# Patient Record
Sex: FEMALE | Race: WHITE | NOT HISPANIC OR LATINO | Employment: OTHER | URBAN - METROPOLITAN AREA
[De-identification: names, ages, dates, MRNs, and addresses within clinical notes are randomized per-mention and may not be internally consistent; named-entity substitution may affect disease eponyms.]

---

## 2017-02-13 ENCOUNTER — HOSPITAL ENCOUNTER (INPATIENT)
Facility: HOSPITAL | Age: 82
LOS: 4 days | Discharge: NURSING, REHAB OR LONG-TERM CARE | End: 2017-02-17
Attending: EMERGENCY MEDICINE | Admitting: INTERNAL MEDICINE

## 2017-02-13 ENCOUNTER — APPOINTMENT (OUTPATIENT)
Dept: GENERAL RADIOLOGY | Facility: HOSPITAL | Age: 82
End: 2017-02-13

## 2017-02-13 DIAGNOSIS — I21.4 NSTEMI (NON-ST ELEVATED MYOCARDIAL INFARCTION) (HCC): Primary | ICD-10-CM

## 2017-02-13 PROBLEM — E78.5 HYPERLIPIDEMIA: Status: ACTIVE | Noted: 2017-02-13

## 2017-02-13 PROBLEM — F25.9 SCHIZOAFFECTIVE DISORDER (HCC): Status: ACTIVE | Noted: 2017-02-13

## 2017-02-13 PROBLEM — I10 HTN (HYPERTENSION): Status: ACTIVE | Noted: 2017-02-13

## 2017-02-13 PROBLEM — I50.9 CHF (CONGESTIVE HEART FAILURE) (HCC): Status: ACTIVE | Noted: 2017-02-13

## 2017-02-13 PROBLEM — IMO0002 TYPE II DIABETES MELLITUS, UNCONTROLLED: Status: ACTIVE | Noted: 2017-02-13

## 2017-02-13 PROBLEM — F03.90 DEMENTIA (HCC): Status: ACTIVE | Noted: 2017-02-13

## 2017-02-13 PROBLEM — I25.10 CAD (CORONARY ARTERY DISEASE): Status: ACTIVE | Noted: 2017-02-13

## 2017-02-13 LAB
ALBUMIN SERPL-MCNC: 4 G/DL (ref 3.5–5.2)
ALBUMIN/GLOB SERPL: 1.3 G/DL
ALP SERPL-CCNC: 118 U/L (ref 39–117)
ALT SERPL W P-5'-P-CCNC: 21 U/L (ref 1–33)
ANION GAP SERPL CALCULATED.3IONS-SCNC: 15.7 MMOL/L
AST SERPL-CCNC: 19 U/L (ref 1–32)
BASOPHILS # BLD AUTO: 0.01 10*3/MM3 (ref 0–0.2)
BASOPHILS NFR BLD AUTO: 0.1 % (ref 0–1.5)
BILIRUB SERPL-MCNC: <0.2 MG/DL (ref 0.1–1.2)
BUN BLD-MCNC: 45 MG/DL (ref 8–23)
BUN/CREAT SERPL: 32.4 (ref 7–25)
CALCIUM SPEC-SCNC: 9.7 MG/DL (ref 8.6–10.5)
CHLORIDE SERPL-SCNC: 99 MMOL/L (ref 98–107)
CO2 SERPL-SCNC: 26.3 MMOL/L (ref 22–29)
CREAT BLD-MCNC: 1.39 MG/DL (ref 0.57–1)
DEPRECATED RDW RBC AUTO: 42.5 FL (ref 37–54)
EOSINOPHIL # BLD AUTO: 0.08 10*3/MM3 (ref 0–0.7)
EOSINOPHIL NFR BLD AUTO: 1.1 % (ref 0.3–6.2)
ERYTHROCYTE [DISTWIDTH] IN BLOOD BY AUTOMATED COUNT: 14.8 % (ref 11.7–13)
GFR SERPL CREATININE-BSD FRML MDRD: 36 ML/MIN/1.73
GLOBULIN UR ELPH-MCNC: 3 GM/DL
GLUCOSE BLD-MCNC: 301 MG/DL (ref 65–99)
GLUCOSE BLDC GLUCOMTR-MCNC: 159 MG/DL (ref 70–130)
GLUCOSE BLDC GLUCOMTR-MCNC: 166 MG/DL (ref 70–130)
HCT VFR BLD AUTO: 36 % (ref 35.6–45.5)
HGB BLD-MCNC: 11.3 G/DL (ref 11.9–15.5)
HOLD SPECIMEN: NORMAL
IMM GRANULOCYTES # BLD: 0 10*3/MM3 (ref 0–0.03)
IMM GRANULOCYTES NFR BLD: 0 % (ref 0–0.5)
LYMPHOCYTES # BLD AUTO: 1.46 10*3/MM3 (ref 0.9–4.8)
LYMPHOCYTES NFR BLD AUTO: 19.6 % (ref 19.6–45.3)
MCH RBC QN AUTO: 24.9 PG (ref 26.9–32)
MCHC RBC AUTO-ENTMCNC: 31.4 G/DL (ref 32.4–36.3)
MCV RBC AUTO: 79.5 FL (ref 80.5–98.2)
MONOCYTES # BLD AUTO: 0.35 10*3/MM3 (ref 0.2–1.2)
MONOCYTES NFR BLD AUTO: 4.7 % (ref 5–12)
NEUTROPHILS # BLD AUTO: 5.56 10*3/MM3 (ref 1.9–8.1)
NEUTROPHILS NFR BLD AUTO: 74.5 % (ref 42.7–76)
NT-PROBNP SERPL-MCNC: 477.3 PG/ML (ref 0–1800)
PLATELET # BLD AUTO: 231 10*3/MM3 (ref 140–500)
PMV BLD AUTO: 11.7 FL (ref 6–12)
POTASSIUM BLD-SCNC: 4.3 MMOL/L (ref 3.5–5.2)
PROT SERPL-MCNC: 7 G/DL (ref 6–8.5)
RBC # BLD AUTO: 4.53 10*6/MM3 (ref 3.9–5.2)
SODIUM BLD-SCNC: 141 MMOL/L (ref 136–145)
TROPONIN T SERPL-MCNC: 0.26 NG/ML (ref 0–0.03)
WBC NRBC COR # BLD: 7.46 10*3/MM3 (ref 4.5–10.7)
WHOLE BLOOD HOLD SPECIMEN: NORMAL

## 2017-02-13 PROCEDURE — 93005 ELECTROCARDIOGRAM TRACING: CPT | Performed by: EMERGENCY MEDICINE

## 2017-02-13 PROCEDURE — 83880 ASSAY OF NATRIURETIC PEPTIDE: CPT | Performed by: EMERGENCY MEDICINE

## 2017-02-13 PROCEDURE — 82962 GLUCOSE BLOOD TEST: CPT

## 2017-02-13 PROCEDURE — 99285 EMERGENCY DEPT VISIT HI MDM: CPT

## 2017-02-13 PROCEDURE — 63710000001 INSULIN ASPART PER 5 UNITS: Performed by: INTERNAL MEDICINE

## 2017-02-13 PROCEDURE — 85025 COMPLETE CBC W/AUTO DIFF WBC: CPT | Performed by: EMERGENCY MEDICINE

## 2017-02-13 PROCEDURE — 80053 COMPREHEN METABOLIC PANEL: CPT | Performed by: EMERGENCY MEDICINE

## 2017-02-13 PROCEDURE — 25010000002 ENOXAPARIN PER 10 MG: Performed by: INTERNAL MEDICINE

## 2017-02-13 PROCEDURE — 84484 ASSAY OF TROPONIN QUANT: CPT | Performed by: EMERGENCY MEDICINE

## 2017-02-13 PROCEDURE — 99222 1ST HOSP IP/OBS MODERATE 55: CPT | Performed by: INTERNAL MEDICINE

## 2017-02-13 PROCEDURE — 71020 HC CHEST PA AND LATERAL: CPT

## 2017-02-13 PROCEDURE — 93010 ELECTROCARDIOGRAM REPORT: CPT | Performed by: INTERNAL MEDICINE

## 2017-02-13 RX ORDER — METOLAZONE 5 MG/1
5 TABLET ORAL DAILY
COMMUNITY
End: 2017-02-17 | Stop reason: HOSPADM

## 2017-02-13 RX ORDER — HYDRALAZINE HYDROCHLORIDE 50 MG/1
50 TABLET, FILM COATED ORAL 4 TIMES DAILY
Status: DISCONTINUED | OUTPATIENT
Start: 2017-02-14 | End: 2017-02-16

## 2017-02-13 RX ORDER — IPRATROPIUM BROMIDE AND ALBUTEROL SULFATE 2.5; .5 MG/3ML; MG/3ML
3 SOLUTION RESPIRATORY (INHALATION)
COMMUNITY

## 2017-02-13 RX ORDER — RISPERIDONE 0.5 MG/1
0.5 TABLET, ORALLY DISINTEGRATING ORAL NIGHTLY
Status: DISCONTINUED | OUTPATIENT
Start: 2017-02-13 | End: 2017-02-14

## 2017-02-13 RX ORDER — POTASSIUM CHLORIDE 20 MEQ/1
40 TABLET, EXTENDED RELEASE ORAL DAILY
Status: ON HOLD | COMMUNITY
End: 2020-01-01 | Stop reason: SDUPTHER

## 2017-02-13 RX ORDER — HYDRALAZINE HYDROCHLORIDE 25 MG/1
25 TABLET, FILM COATED ORAL 4 TIMES DAILY
COMMUNITY
End: 2017-02-17 | Stop reason: HOSPADM

## 2017-02-13 RX ORDER — ERGOCALCIFEROL 1.25 MG/1
50000 CAPSULE ORAL 3 TIMES WEEKLY
COMMUNITY
End: 2018-01-09 | Stop reason: DRUGHIGH

## 2017-02-13 RX ORDER — FUROSEMIDE 40 MG/1
40 TABLET ORAL 2 TIMES DAILY
Status: DISCONTINUED | OUTPATIENT
Start: 2017-02-13 | End: 2017-02-17 | Stop reason: HOSPADM

## 2017-02-13 RX ORDER — ASPIRIN 325 MG
325 TABLET ORAL DAILY
Status: DISCONTINUED | OUTPATIENT
Start: 2017-02-13 | End: 2017-02-13

## 2017-02-13 RX ORDER — NITROGLYCERIN 0.4 MG/1
0.4 TABLET SUBLINGUAL
COMMUNITY

## 2017-02-13 RX ORDER — HYDRALAZINE HYDROCHLORIDE 25 MG/1
25 TABLET, FILM COATED ORAL 4 TIMES DAILY
Status: DISCONTINUED | OUTPATIENT
Start: 2017-02-13 | End: 2017-02-13

## 2017-02-13 RX ORDER — IPRATROPIUM BROMIDE AND ALBUTEROL SULFATE 2.5; .5 MG/3ML; MG/3ML
3 SOLUTION RESPIRATORY (INHALATION)
Status: DISCONTINUED | OUTPATIENT
Start: 2017-02-13 | End: 2017-02-17 | Stop reason: HOSPADM

## 2017-02-13 RX ORDER — FUROSEMIDE 40 MG/1
40 TABLET ORAL 3 TIMES DAILY
COMMUNITY
End: 2018-12-05 | Stop reason: HOSPADM

## 2017-02-13 RX ORDER — ACETAMINOPHEN 325 MG/1
650 TABLET ORAL EVERY 4 HOURS PRN
Status: DISCONTINUED | OUTPATIENT
Start: 2017-02-13 | End: 2017-02-17 | Stop reason: HOSPADM

## 2017-02-13 RX ORDER — LISINOPRIL 40 MG/1
40 TABLET ORAL DAILY
Status: ON HOLD | COMMUNITY
End: 2020-01-01 | Stop reason: SDUPTHER

## 2017-02-13 RX ORDER — ASPIRIN 325 MG
325 TABLET ORAL DAILY
Status: DISCONTINUED | OUTPATIENT
Start: 2017-02-14 | End: 2017-02-14

## 2017-02-13 RX ORDER — LISINOPRIL 40 MG/1
40 TABLET ORAL DAILY
Status: DISCONTINUED | OUTPATIENT
Start: 2017-02-13 | End: 2017-02-17 | Stop reason: HOSPADM

## 2017-02-13 RX ORDER — ASPIRIN 325 MG
325 TABLET ORAL DAILY
COMMUNITY
End: 2018-01-13 | Stop reason: HOSPADM

## 2017-02-13 RX ORDER — ACETAMINOPHEN 325 MG/1
650 TABLET ORAL EVERY 4 HOURS PRN
COMMUNITY

## 2017-02-13 RX ORDER — NICOTINE POLACRILEX 4 MG
15 LOZENGE BUCCAL
Status: DISCONTINUED | OUTPATIENT
Start: 2017-02-13 | End: 2017-02-17 | Stop reason: HOSPADM

## 2017-02-13 RX ORDER — TRAMADOL HYDROCHLORIDE 50 MG/1
50 TABLET ORAL EVERY 6 HOURS PRN
Status: DISCONTINUED | OUTPATIENT
Start: 2017-02-13 | End: 2017-02-17

## 2017-02-13 RX ORDER — POTASSIUM CHLORIDE 750 MG/1
20 CAPSULE, EXTENDED RELEASE ORAL DAILY
Status: DISCONTINUED | OUTPATIENT
Start: 2017-02-13 | End: 2017-02-17 | Stop reason: HOSPADM

## 2017-02-13 RX ORDER — ASPIRIN 325 MG
325 TABLET ORAL ONCE
Status: COMPLETED | OUTPATIENT
Start: 2017-02-13 | End: 2017-02-13

## 2017-02-13 RX ORDER — NITROGLYCERIN 0.4 MG/1
0.4 TABLET SUBLINGUAL
Status: DISCONTINUED | OUTPATIENT
Start: 2017-02-13 | End: 2017-02-17 | Stop reason: HOSPADM

## 2017-02-13 RX ORDER — SODIUM CHLORIDE 0.9 % (FLUSH) 0.9 %
10 SYRINGE (ML) INJECTION AS NEEDED
Status: DISCONTINUED | OUTPATIENT
Start: 2017-02-13 | End: 2017-02-17 | Stop reason: HOSPADM

## 2017-02-13 RX ORDER — RISPERIDONE 0.5 MG/1
0.5 TABLET, ORALLY DISINTEGRATING ORAL NIGHTLY
COMMUNITY
End: 2017-02-17 | Stop reason: HOSPADM

## 2017-02-13 RX ORDER — DEXTROSE MONOHYDRATE 25 G/50ML
25 INJECTION, SOLUTION INTRAVENOUS
Status: DISCONTINUED | OUTPATIENT
Start: 2017-02-13 | End: 2017-02-17 | Stop reason: HOSPADM

## 2017-02-13 RX ORDER — TRAMADOL HYDROCHLORIDE 50 MG/1
50 TABLET ORAL EVERY 6 HOURS PRN
Status: ON HOLD | COMMUNITY
End: 2017-02-17

## 2017-02-13 RX ORDER — ISOSORBIDE MONONITRATE 60 MG/1
60 TABLET, EXTENDED RELEASE ORAL DAILY
COMMUNITY
End: 2017-02-17 | Stop reason: HOSPADM

## 2017-02-13 RX ADMIN — FUROSEMIDE 40 MG: 40 TABLET ORAL at 18:23

## 2017-02-13 RX ADMIN — NITROGLYCERIN 1 INCH: 20 OINTMENT TOPICAL at 23:36

## 2017-02-13 RX ADMIN — CLONIDINE 1 PATCH: 0.2 PATCH, EXTENDED RELEASE TRANSDERMAL at 18:23

## 2017-02-13 RX ADMIN — RISPERIDONE 0.5 MG: 0.5 TABLET, ORALLY DISINTEGRATING ORAL at 21:40

## 2017-02-13 RX ADMIN — HYDRALAZINE HYDROCHLORIDE 25 MG: 25 TABLET ORAL at 18:23

## 2017-02-13 RX ADMIN — POTASSIUM CHLORIDE 20 MEQ: 750 CAPSULE, EXTENDED RELEASE ORAL at 17:16

## 2017-02-13 RX ADMIN — LISINOPRIL 40 MG: 40 TABLET ORAL at 18:23

## 2017-02-13 RX ADMIN — NITROGLYCERIN 1 INCH: 20 OINTMENT TOPICAL at 18:22

## 2017-02-13 RX ADMIN — HYDRALAZINE HYDROCHLORIDE 25 MG: 25 TABLET ORAL at 21:40

## 2017-02-13 RX ADMIN — INSULIN ASPART 2 UNITS: 100 INJECTION, SOLUTION INTRAVENOUS; SUBCUTANEOUS at 21:40

## 2017-02-13 RX ADMIN — TRAMADOL HYDROCHLORIDE 50 MG: 50 TABLET, COATED ORAL at 19:32

## 2017-02-13 RX ADMIN — NITROGLYCERIN 1 INCH: 20 OINTMENT TOPICAL at 14:20

## 2017-02-13 RX ADMIN — ASPIRIN 325 MG: 325 TABLET ORAL at 14:19

## 2017-02-13 RX ADMIN — ENOXAPARIN SODIUM 110 MG: 120 INJECTION SUBCUTANEOUS at 18:22

## 2017-02-13 RX ADMIN — INSULIN ASPART 10 UNITS: 100 INJECTION, SOLUTION INTRAVENOUS; SUBCUTANEOUS at 17:17

## 2017-02-13 NOTE — PLAN OF CARE
Problem: Patient Care Overview (Adult)  Goal: Plan of Care Review  Outcome: Ongoing (interventions implemented as appropriate)    02/13/17 1839   Coping/Psychosocial Response Interventions   Plan Of Care Reviewed With patient   Patient Care Overview   Progress no change   Outcome Evaluation   Outcome Summary/Follow up Plan Pt a new admission, no falls, VSS, no more c/o chest pain, pt resting comfortably          Problem: Fall Risk (Adult)  Goal: Absence of Falls  Outcome: Ongoing (interventions implemented as appropriate)    02/13/17 1839   Fall Risk (Adult)   Absence of Falls achieves outcome         Problem: Diabetes, Type 2 (Adult)  Goal: Signs and Symptoms of Listed Potential Problems Will be Absent or Manageable (Diabetes, Type 2)  Outcome: Ongoing (interventions implemented as appropriate)    02/13/17 1839   Diabetes, Type 2   Problems Assessed (Type 2 Diabetes) all   Problems Present (Type 2 Diabetes) impaired glycemic control

## 2017-02-13 NOTE — ED PROVIDER NOTES
EMERGENCY DEPARTMENT ENCOUNTER    CHIEF COMPLAINT  Chief Complaint: Chest Pain  History given by: Patient; Nursing home  History limited by: Dementia  Room Number: 09/09  PMD: Gustavo Mcgrath MD      HPI:  Pt is a 81 y.o. female who presents complaining of chest pain that occurs when she gets upset. Pt states that she is also having dyspnea. Pt has a hx of dementia/schizoaffective d/o and is a poor historian - she tends to ramble and has to be redirected.    Duration:  Unable to assess  Onset: gradual  Timing: intermittent  Location: central chest  Radiation: none  Quality: unable to assess  Intensity/Severity: unable to assess  Progression: improved  Associated Symptoms: dyspnea  Aggravating Factors: getting upset  Alleviating Factors: none  Previous Episodes: similar episodes when she gets upset  Treatment before arrival: none    PAST MEDICAL HISTORY  Active Ambulatory Problems     Diagnosis Date Noted   • No Active Ambulatory Problems     Resolved Ambulatory Problems     Diagnosis Date Noted   • No Resolved Ambulatory Problems     No Additional Past Medical History       PAST SURGICAL HISTORY  No past surgical history on file.    FAMILY HISTORY  No family history on file.    SOCIAL HISTORY  Social History     Social History   • Marital status: Single     Spouse name: N/A   • Number of children: N/A   • Years of education: N/A     Occupational History   • Not on file.     Social History Main Topics   • Smoking status: Not on file   • Smokeless tobacco: Not on file   • Alcohol use Not on file   • Drug use: Not on file   • Sexual activity: Not on file     Other Topics Concern   • Not on file     Social History Narrative       ALLERGIES  Review of patient's allergies indicates not on file.    REVIEW OF SYSTEMS  Review of Systems   Unable to perform ROS: Dementia   Respiratory: Positive for shortness of breath.    Cardiovascular: Positive for chest pain.       PHYSICAL EXAM  ED Triage Vitals   Temp Heart Rate Resp  BP SpO2   02/13/17 1252 02/13/17 1252 02/13/17 1252 02/13/17 1252 02/13/17 1252   98.8 °F (37.1 °C) 87 18 213/97 98 %      Temp src Heart Rate Source Patient Position BP Location FiO2 (%)   02/13/17 1252 02/13/17 1252 -- -- --   Tympanic Monitor          Physical Exam   Constitutional: She is oriented to person, place, and time and well-developed, well-nourished, and in no distress. No distress.   HENT:   Head: Normocephalic and atraumatic.   Eyes: EOM are normal. Pupils are equal, round, and reactive to light.   Neck: Normal range of motion. Neck supple.   Cardiovascular: Normal rate, regular rhythm and normal heart sounds.    Pulmonary/Chest: Effort normal and breath sounds normal. No respiratory distress.   Abdominal: Soft. There is no tenderness. There is no rebound and no guarding.   Musculoskeletal: Normal range of motion. She exhibits no edema.   Neurological: She is alert and oriented to person, place, and time. She has normal sensation and normal strength.   Skin: Skin is warm and dry. No rash noted.   Psychiatric:   Tangential thought process   Nursing note and vitals reviewed.      LAB RESULTS  Lab Results (last 24 hours)     Procedure Component Value Units Date/Time    CBC & Differential [[id number]] Collected:  02/13/17 1311    Specimen:  Blood Updated:  02/13/17 1341    Narrative:       The following orders were created for panel order CBC & Differential.  Procedure                               Abnormality         Status                     ---------                               -----------         ------                     CBC Auto Differential[[id number]]         Abnormal            Final result                 Please view results for these tests on the individual orders.    Comprehensive Metabolic Panel [[id number]]  (Abnormal) Collected:  02/13/17 1311    Specimen:  Blood Updated:  02/13/17 1354     Glucose 301 (H) mg/dL      BUN 45 (H) mg/dL      Creatinine 1.39 (H) mg/dL      Sodium 141 mmol/L       Potassium 4.3 mmol/L      Chloride 99 mmol/L      CO2 26.3 mmol/L      Calcium 9.7 mg/dL      Total Protein 7.0 g/dL      Albumin 4.00 g/dL      ALT (SGPT) 21 U/L      AST (SGOT) 19 U/L      Alkaline Phosphatase 118 (H) U/L      Total Bilirubin <0.2 mg/dL      eGFR Non African Amer 36 (L) mL/min/1.73      Globulin 3.0 gm/dL      A/G Ratio 1.3 g/dL      BUN/Creatinine Ratio 32.4 (H)      Anion Gap 15.7 mmol/L     Narrative:       The MDRD GFR formula is only valid for adults with stable renal function between ages 18 and 70.    Troponin [[id number]]  (Abnormal) Collected:  02/13/17 1311    Specimen:  Blood Updated:  02/13/17 1359     Troponin T 0.261 (C) ng/mL     Narrative:       Troponin T Reference Ranges:  Less than 0.03 ng/mL:    Negative for AMI  0.03 to 0.09 ng/mL:      Indeterminant for AMI  Greater than 0.09 ng/mL: Positive for AMI    CBC Auto Differential [[id number]]  (Abnormal) Collected:  02/13/17 1311    Specimen:  Blood Updated:  02/13/17 1341     WBC 7.46 10*3/mm3      RBC 4.53 10*6/mm3      Hemoglobin 11.3 (L) g/dL      Hematocrit 36.0 %      MCV 79.5 (L) fL      MCH 24.9 (L) pg      MCHC 31.4 (L) g/dL      RDW 14.8 (H) %      RDW-SD 42.5 fl      MPV 11.7 fL      Platelets 231 10*3/mm3      Neutrophil % 74.5 %      Lymphocyte % 19.6 %      Monocyte % 4.7 (L) %      Eosinophil % 1.1 %      Basophil % 0.1 %      Immature Grans % 0.0 %      Neutrophils, Absolute 5.56 10*3/mm3      Lymphocytes, Absolute 1.46 10*3/mm3      Monocytes, Absolute 0.35 10*3/mm3      Eosinophils, Absolute 0.08 10*3/mm3      Basophils, Absolute 0.01 10*3/mm3      Immature Grans, Absolute 0.00 10*3/mm3     BNP [[id number]]  (Normal) Collected:  02/13/17 1312    Specimen:  Blood Updated:  02/13/17 1412     proBNP 477.3 pg/mL     Narrative:       Among patients with dyspnea, NT-proBNP is highly sensitive for the detection of acute congestive heart failure. In addition NT-proBNP of <300 pg/ml effectively rules out acute congestive heart  failure with 99% negative predictive value.          I ordered the above labs and reviewed the results    RADIOLOGY  XR Chest 2 View   Final Result   Final result by Blas Wells MD (02/13/17 13:32:42)     Narrative:     XR CHEST 2 VW-     Clinical: Chest pain, shortness of breath     FINDINGS: There is atherosclerotic calcification of the aorta. Cardiac  size upper limits of normal. Prominence of the pulmonary vasculature  consistent with mild to moderate congestion. Atypical pneumonia is felt  to be less likely.No pleural effusion demonstrated.  Few small calcified  benign-appearing mediastinal and hilar lymph nodes noted. The remainder  is unremarkable.     This report was finalized on 2/13/2017 1:32 PM by Dr. Blas Wells MD.             I ordered the above noted radiological studies. Interpreted by radiologist. Reviewed by me in PACS.       PROCEDURES  Procedures  EKG           EKG time: 1310  Rhythm/Rate: NSR, 95BPM  P waves and IA: nml  QRS, axis: nml   ST and T waves: nonspecific ST/T wave changes     Interpreted Contemporaneously by me, independently viewed  minimally changed 1/13/16    PROGRESS AND CONSULTS  ED Course   1:15 PM  Ordered blood work, EKG, CXR  2:11 PM  Rechecked with pt. Informed pt of her lab results showing NSTEMI and need for admission. Will give NTG paste in the ED.   Time 2:25 PM  Discussed case with Dr Mcgrath (PCP)  Reviewed history, exam, results and treatments.  Discussed concerns and plan of care. Dr Mcgrath accepts pt to be admitted to telemetry.        MEDICAL DECISION MAKING      MDM  Number of Diagnoses or Management Options  NSTEMI (non-ST elevated myocardial infarction):      Amount and/or Complexity of Data Reviewed  Clinical lab tests: ordered and reviewed (Troponin 0.261)  Tests in the radiology section of CPT®: ordered and reviewed  Tests in the medicine section of CPT®: ordered and reviewed (EKG - See EKG procedure note  )  Review and summarize past medical records:  (No prior visits here)  Discuss the patient with other providers: yes  Independent visualization of images, tracings, or specimens: yes           DIAGNOSIS  Final diagnoses:   NSTEMI (non-ST elevated myocardial infarction)       DISPOSITION  ADMISSION    Discussed treatment plan and reason for admission with pt/family and admitting physician.  Pt/family voiced understanding of the plan for admission for further testing/treatment as needed.         Latest Documented Vital Signs:  As of 2:27 PM  BP- 150/66 HR- 78 Temp- 97.5 °F (36.4 °C) (Tympanic) O2 sat- 99%    --  Documentation assistance provided by magaly Guerra for Dr. Stacy.  Information recorded by the scribe was done at my direction and has been verified and validated by me.       Jamar Guerra  02/13/17 1427       Aaron Stacy MD  02/13/17 9044

## 2017-02-13 NOTE — PROGRESS NOTES
"Pharmacy Consult - [facility]    Britney [facility] has been consulted for pharmacy to dose enoxaparin for treatment (NSTEMI) per Dr Mcgrath's request.     Relevant clinical data and objective history reviewed:  81 y.o. female 64.5\" (163.8 cm) 240 lb (109 kg)    Home Anticoagulation: none    Past Medical History   Diagnosis Date   • Angina pectoris    • Asthma    • Atherosclerosis    • CHF (congestive heart failure)    • Depression    • Diabetes mellitus    • GERD (gastroesophageal reflux disease)    • Hyperlipidemia    • Hypertension    • TIA (transient ischemic attack)      has No Known Allergies.    Lab Results   Component Value Date    WBC 7.46 02/13/2017    HGB 11.3 (L) 02/13/2017    HCT 36.0 02/13/2017    MCV 79.5 (L) 02/13/2017     02/13/2017     Lab Results   Component Value Date    GLUCOSE 301 (H) 02/13/2017    CALCIUM 9.7 02/13/2017     02/13/2017    K 4.3 02/13/2017    CO2 26.3 02/13/2017    CL 99 02/13/2017    BUN 45 (H) 02/13/2017    CREATININE 1.39 (H) 02/13/2017    EGFRIFNONA 36 (L) 02/13/2017    BCR 32.4 (H) 02/13/2017    ANIONGAP 15.7 02/13/2017       Estimated Creatinine Clearance: 38.6 mL/min (by C-G formula based on Cr of 1.39).      Assessment/Plan    Will start patient on 110 mg (1 mg/kg) subcutaneous every 12 hours, adjusted for renal function. Pharmacy will continue to follow.     Jenny Woods [facility]    "

## 2017-02-13 NOTE — H&P
HISTORY AND PHYSICAL   [facility], [facility]      2017    Patient Identification:    Name: Britney Deras  Age: 81 y.o.  Sex: female  :  1935  MRN: [id number]                       Primary Care Physician: Gustavo Mcgrath MD    Chief Complaint:      Chief Complaint   Patient presents with   • Chest Pain   • Shortness of Breath         History of Present Illness:     Patient is a 80 y/o female, resident at NH. She has h/o HTN, DM, CHF,TIA, hyperlipidemia, dementia and schizoaffective disorder.She developed chest pain associated with SOB and dizziness. She was sent to ER, she was found to have a troponin of 0.261, mild to moderate congestion in CHF, patient was diagnosed of NSTEMI and was started on nitropaste, ASA and admitted to the hospital for further management. At the time of interview, she was chest pain free.    Past Medical History:  Past Medical History   Diagnosis Date   • Angina pectoris    • Asthma    • Atherosclerosis    • CHF (congestive heart failure)    • Depression    • Diabetes mellitus    • GERD (gastroesophageal reflux disease)    • Hyperlipidemia    • Hypertension    • TIA (transient ischemic attack)      Past Surgical History:  History reviewed. No pertinent past surgical history.   Home Meds:  Prescriptions Prior to Admission   Medication Sig Dispense Refill Last Dose   • acetaminophen (TYLENOL) 325 MG tablet Take 650 mg by mouth Every 4 (Four) Hours As Needed for mild pain (1-3).      • aspirin 325 MG tablet Take 325 mg by mouth Daily.      • CloNIDine (CATAPRES-TTS) 0.2 MG/24HR patch Place 1 patch on the skin 1 (One) Time Per Week.      • furosemide (LASIX) 40 MG tablet Take 40 mg by mouth 2 (Two) Times a Day.      • hydrALAZINE (APRESOLINE) 25 MG tablet Take 25 mg by mouth 4 (Four) Times a Day.      • insulin aspart (novoLOG) 100 UNIT/ML injection Inject 10 Units under the skin Every Morning Before Breakfast.      • insulin aspart (novoLOG) 100 UNIT/ML injection  Inject 8 Units under the skin Daily Before Lunch.      • insulin aspart (novoLOG) 100 UNIT/ML injection Inject 10 Units under the skin Daily Before Supper.      • insulin detemir (LEVEMIR) 100 UNIT/ML injection Inject 12 Units under the skin Every Night.      • ipratropium-albuterol (DUO-NEB) 0.5-2.5 mg/mL nebulizer Take 3 mL by nebulization 4 (Four) Times a Day.      • isosorbide mononitrate (IMDUR) 60 MG 24 hr tablet Take 60 mg by mouth Daily.      • lisinopril (PRINIVIL,ZESTRIL) 40 MG tablet Take 40 mg by mouth Daily.      • magnesium hydroxide (MILK OF MAGNESIA) 400 MG/5ML suspension Take 30 mL by mouth Daily As Needed for constipation.      • metOLazone (ZAROXOLYN) 5 MG tablet Take 5 mg by mouth Daily.      • nitroglycerin (NITROSTAT) 0.4 MG SL tablet Place 0.4 mg under the tongue Every 5 (Five) Minutes As Needed for chest pain. Take no more than 3 doses in 15 minutes.      • potassium chloride (K-DUR,KLOR-CON) 20 MEQ CR tablet Take 40 mEq by mouth Daily.      • risperiDONE (risperDAL M-TABS) 0.5 MG disintegrating tablet Take 0.5 mg by mouth Every Night.      • traMADol (ULTRAM) 50 MG tablet Take 50 mg by mouth Every 6 (Six) Hours As Needed for moderate pain (4-6).      • vitamin D (ERGOCALCIFEROL) 65754 UNITS capsule capsule Take 50,000 Units by mouth 3 (Three) Times a Week. On Monday, Wednesday, and Friday          Allergies:  No Known Allergies  Immunizations:    There is no immunization history on file for this patient.  Social History:   Social History     Social History Narrative   • No narrative on file     Social History   Substance Use Topics   • Smoking status: Former Smoker   • Smokeless tobacco: Not on file   • Alcohol use Not on file     Family History:  History reviewed. No pertinent family history.     Review of Systems  See history of present illness and past medical history.    Patient denies headache, dizziness, syncope, falls, trauma, change in vision, change in hearing, change in taste,  "changes in weight, changes in appetite, focal weakness, numbness, or paresthesia.    Patient denies epistaxis, hemoptysis, nausea, vomiting,hematemesis, diarrhea, constipation or hematchezia.    Denies hematuria, pyuria, dysuria, hesitancy, frequency or urgency.   Denies consumption of raw and under cooked meats foods or change in water source.  Denies fever, chills, sweats, night sweats.    Denies missing any routine medications.   Remainder of ROS is negative.    Objective:    Exam:    tMax 24 hrs: Temp (24hrs), Av °F (36.7 °C), Min:97.2 °F (36.2 °C), Max:98.8 °F (37.1 °C)    Vitals Ranges:   Temp:  [97.2 °F (36.2 °C)-98.8 °F (37.1 °C)] 97.9 °F (36.6 °C)  Heart Rate:  [78-96] 85  Resp:  [16-22] 16  BP: (150-213)/(56-97) 185/74    Visit Vitals   • BP (!) 185/74 (BP Location: Left arm, Patient Position: Lying)   • Pulse 85   • Temp 97.9 °F (36.6 °C) (Oral)   • Resp 16   • Ht 64.5\" (163.8 cm)   • Wt 240 lb (109 kg)   • SpO2 97%   • BMI 40.56 kg/m2       General: Alert, oriented x 2. Cooperative, no distress, appears stated age  HEENT:    Head: Normocephalic, without obvious abnormality, atraumatic  Eyes: EOM are normal. Pupils are equal, round, and reactive to light.   Oropharynx: Mucosa and tongue normal  Neck: Supple, symmetrical, trachea midline, no adenopathy;              thyroid:  no enlargement/tenderness/nodules;              no carotid bruit or JVD  Cardiovascular: Normal rate, regular rhythm and intact distal pulses.              Exam reveals no gallop and no friction rub. No murmur heard  Chest wall: No tenderness or deformity  Pulmonary: Diminished BS.  No rhonchi, wheezing or rales.   Abdominal: Soft. Non-tender, bowel sounds active all four quadrants,     no masses, no hepatomegaly, no splenomegaly.   Extremities: No cyanosis. Edema of LE ++, diminished peripheral pulses  Pulses: 1 + symmetric all extremities  Neurological: She is alert and oriented to person, place.                 CNII-XII intact, " normal strength, sensation intact throughout  Skin: Skin color, texture, turgor normal, no rashes or lesions      Data Review:      Results from last 7 days  Lab Units 02/13/17  1311   WBC 10*3/mm3 7.46   HEMOGLOBIN g/dL 11.3*   HEMATOCRIT % 36.0   PLATELETS 10*3/mm3 231         Results from last 7 days  Lab Units 02/13/17  1311   SODIUM mmol/L 141   POTASSIUM mmol/L 4.3   CHLORIDE mmol/L 99   TOTAL CO2 mmol/L 26.3   BUN mg/dL 45*   CREATININE mg/dL 1.39*   CALCIUM mg/dL 9.7   BILIRUBIN mg/dL <0.2   ALK PHOS U/L 118*   ALT (SGPT) U/L 21   AST (SGOT) U/L 19   GLUCOSE mg/dL 301*             0  Lab Value Date/Time   TROPONINT 0.261 (C) 02/13/2017 1311        Imaging Results (all)     Procedure Component Value Units Date/Time    XR Chest 2 View [[id number]] Collected:  02/13/17 1331     Updated:  02/13/17 1335    Narrative:       XR CHEST 2 VW-     Clinical: Chest pain, shortness of breath     FINDINGS: There is atherosclerotic calcification of the aorta. Cardiac  size upper limits of normal. Prominence of the pulmonary vasculature  consistent with mild to moderate congestion. Atypical pneumonia is felt  to be less likely.No pleural effusion demonstrated.  Few small calcified  benign-appearing mediastinal and hilar lymph nodes noted. The remainder  is unremarkable.     This report was finalized on 2/13/2017 1:32 PM by Dr. Blas Wells MD.             Assessment:    Principal Problem:    NSTEMI (non-ST elevated myocardial infarction)  Active Problems:    CHF (congestive heart failure)    Type II diabetes mellitus, uncontrolled    Hyperlipidemia    HTN (hypertension)    Dementia    Schizoaffective disorder    CAD (coronary artery disease)      Patient Active Problem List   Diagnosis Code   • NSTEMI (non-ST elevated myocardial infarction) I21.4   • CHF (congestive heart failure) I50.9   • Type II diabetes mellitus, uncontrolled E11.65   • Hyperlipidemia E78.5   • HTN (hypertension) I10   • Dementia F03.90   •  Schizoaffective disorder F25.9   • CAD (coronary artery disease) I25.10       Plan:    Inpatient admission  ASA, nitropaste, lovenox.  Cardiology consult  Adjust BP medicines  Monitor and correct electrolytes  Accucheck and SSI  Monitor mental status  Home medications  DVT/stress ulcer prophylaxis  PT consult  Labs in am        Gustavo Mcgrath MD  2/13/2017      Much of this encounter note is an electronic transcription/translation of spoken language to printed text. The electronic translation of spoken language may permit erroneous, or at times, nonsensical words or phrases to be inadvertently transcribed; Although I have reviewed the note for such errors, some may still exist

## 2017-02-14 ENCOUNTER — APPOINTMENT (OUTPATIENT)
Dept: CARDIOLOGY | Facility: HOSPITAL | Age: 82
End: 2017-02-14
Attending: INTERNAL MEDICINE

## 2017-02-14 DIAGNOSIS — I21.4 NSTEMI (NON-ST ELEVATED MYOCARDIAL INFARCTION) (HCC): Primary | ICD-10-CM

## 2017-02-14 PROBLEM — N18.30 CHRONIC RENAL INSUFFICIENCY, STAGE III (MODERATE) (HCC): Status: ACTIVE | Noted: 2017-02-14

## 2017-02-14 LAB
ANION GAP SERPL CALCULATED.3IONS-SCNC: 14.1 MMOL/L
ASCENDING AORTA: 2.8 CM
BH CV ECHO MEAS - ACS: 1.2 CM
BH CV ECHO MEAS - AO MEAN PG (FULL): 2 MMHG
BH CV ECHO MEAS - AO MEAN PG: 7 MMHG
BH CV ECHO MEAS - AO ROOT AREA (BSA CORRECTED): 1.1
BH CV ECHO MEAS - AO ROOT AREA: 3.8 CM^2
BH CV ECHO MEAS - AO ROOT DIAM: 2.2 CM
BH CV ECHO MEAS - AO V2 MAX: 170 CM/SEC
BH CV ECHO MEAS - AO V2 MEAN: 125 CM/SEC
BH CV ECHO MEAS - AO V2 VTI: 40.6 CM
BH CV ECHO MEAS - ASC AORTA: 2.8 CM
BH CV ECHO MEAS - AVA(I,A): 2.1 CM^2
BH CV ECHO MEAS - AVA(I,D): 2.1 CM^2
BH CV ECHO MEAS - BSA(HAYCOCK): 2.2 M^2
BH CV ECHO MEAS - BSA: 2.1 M^2
BH CV ECHO MEAS - BZI_BMI: 37.4 KILOGRAMS/M^2
BH CV ECHO MEAS - BZI_METRIC_HEIGHT: 165.1 CM
BH CV ECHO MEAS - BZI_METRIC_WEIGHT: 102.1 KG
BH CV ECHO MEAS - CONTRAST EF (2CH): 71.3 ML/M^2
BH CV ECHO MEAS - CONTRAST EF 4CH: 64.3 ML/M^2
BH CV ECHO MEAS - EDV(CUBED): 42.9 ML
BH CV ECHO MEAS - EDV(MOD-SP2): 101 ML
BH CV ECHO MEAS - EDV(MOD-SP4): 98 ML
BH CV ECHO MEAS - EDV(TEICH): 50.9 ML
BH CV ECHO MEAS - EF(CUBED): 48.8 %
BH CV ECHO MEAS - EF(MOD-SP2): 71.3 %
BH CV ECHO MEAS - EF(MOD-SP4): 64.3 %
BH CV ECHO MEAS - EF(TEICH): 41.9 %
BH CV ECHO MEAS - ESV(CUBED): 22 ML
BH CV ECHO MEAS - ESV(MOD-SP2): 29 ML
BH CV ECHO MEAS - ESV(MOD-SP4): 35 ML
BH CV ECHO MEAS - ESV(TEICH): 29.6 ML
BH CV ECHO MEAS - FS: 20 %
BH CV ECHO MEAS - IVS/LVPW: 1
BH CV ECHO MEAS - IVSD: 1.2 CM
BH CV ECHO MEAS - LAT PEAK E' VEL: 8 CM/SEC
BH CV ECHO MEAS - LV DIASTOLIC VOL/BSA (35-75): 47.1 ML/M^2
BH CV ECHO MEAS - LV MASS(C)D: 135.8 GRAMS
BH CV ECHO MEAS - LV MASS(C)DI: 65.3 GRAMS/M^2
BH CV ECHO MEAS - LV MEAN PG: 5 MMHG
BH CV ECHO MEAS - LV SYSTOLIC VOL/BSA (12-30): 16.8 ML/M^2
BH CV ECHO MEAS - LV V1 MAX: 142 CM/SEC
BH CV ECHO MEAS - LV V1 MEAN: 102 CM/SEC
BH CV ECHO MEAS - LV V1 VTI: 34.2 CM
BH CV ECHO MEAS - LVIDD: 3.5 CM
BH CV ECHO MEAS - LVIDS: 2.8 CM
BH CV ECHO MEAS - LVLD AP2: 7.9 CM
BH CV ECHO MEAS - LVLD AP4: 7.8 CM
BH CV ECHO MEAS - LVLS AP2: 6.5 CM
BH CV ECHO MEAS - LVLS AP4: 6.5 CM
BH CV ECHO MEAS - LVOT AREA (M): 2.5 CM^2
BH CV ECHO MEAS - LVOT AREA: 2.5 CM^2
BH CV ECHO MEAS - LVOT DIAM: 1.8 CM
BH CV ECHO MEAS - LVPWD: 1.2 CM
BH CV ECHO MEAS - MED PEAK E' VEL: 8 CM/SEC
BH CV ECHO MEAS - MV A DUR: 0.19 SEC
BH CV ECHO MEAS - MV A MAX VEL: 101 CM/SEC
BH CV ECHO MEAS - MV DEC SLOPE: 609 CM/SEC^2
BH CV ECHO MEAS - MV DEC TIME: 0.25 SEC
BH CV ECHO MEAS - MV E MAX VEL: 138 CM/SEC
BH CV ECHO MEAS - MV E/A: 1.4
BH CV ECHO MEAS - MV MEAN PG: 3 MMHG
BH CV ECHO MEAS - MV P1/2T MAX VEL: 148 CM/SEC
BH CV ECHO MEAS - MV P1/2T: 71.2 MSEC
BH CV ECHO MEAS - MV V2 MEAN: 82.6 CM/SEC
BH CV ECHO MEAS - MV V2 VTI: 46.8 CM
BH CV ECHO MEAS - MVA P1/2T LCG: 1.5 CM^2
BH CV ECHO MEAS - MVA(P1/2T): 3.1 CM^2
BH CV ECHO MEAS - MVA(VTI): 1.9 CM^2
BH CV ECHO MEAS - PA ACC SLOPE: 10.7 CM/SEC^2
BH CV ECHO MEAS - PA ACC TIME: 0.13 SEC
BH CV ECHO MEAS - PA MAX PG (FULL): 1.8 MMHG
BH CV ECHO MEAS - PA MAX PG: 5.5 MMHG
BH CV ECHO MEAS - PA PR(ACCEL): 21.9 MMHG
BH CV ECHO MEAS - PA V2 MAX: 117 CM/SEC
BH CV ECHO MEAS - PULM A REVS DUR: 0.14 SEC
BH CV ECHO MEAS - PULM A REVS VEL: 32.5 CM/SEC
BH CV ECHO MEAS - PULM DIAS VEL: 70.1 CM/SEC
BH CV ECHO MEAS - PULM S/D: 0.67
BH CV ECHO MEAS - PULM SYS VEL: 47.2 CM/SEC
BH CV ECHO MEAS - PVA(V,A): 5.8 CM^2
BH CV ECHO MEAS - PVA(V,D): 5.8 CM^2
BH CV ECHO MEAS - QP/QS: 1.9
BH CV ECHO MEAS - RAP SYSTOLE: 3 MMHG
BH CV ECHO MEAS - RV MAX PG: 3.6 MMHG
BH CV ECHO MEAS - RV MEAN PG: 2 MMHG
BH CV ECHO MEAS - RV V1 MAX: 95.5 CM/SEC
BH CV ECHO MEAS - RV V1 MEAN: 64.9 CM/SEC
BH CV ECHO MEAS - RV V1 VTI: 23.3 CM
BH CV ECHO MEAS - RVOT AREA: 7.1 CM^2
BH CV ECHO MEAS - RVOT DIAM: 3 CM
BH CV ECHO MEAS - RVSP: 25.7 MMHG
BH CV ECHO MEAS - SI(AO): 74.2 ML/M^2
BH CV ECHO MEAS - SI(CUBED): 10.1 ML/M^2
BH CV ECHO MEAS - SI(LVOT): 41.8 ML/M^2
BH CV ECHO MEAS - SI(MOD-SP2): 34.6 ML/M^2
BH CV ECHO MEAS - SI(MOD-SP4): 30.3 ML/M^2
BH CV ECHO MEAS - SI(TEICH): 10.3 ML/M^2
BH CV ECHO MEAS - SV(AO): 154.3 ML
BH CV ECHO MEAS - SV(CUBED): 20.9 ML
BH CV ECHO MEAS - SV(LVOT): 87 ML
BH CV ECHO MEAS - SV(MOD-SP2): 72 ML
BH CV ECHO MEAS - SV(MOD-SP4): 63 ML
BH CV ECHO MEAS - SV(RVOT): 164.7 ML
BH CV ECHO MEAS - SV(TEICH): 21.3 ML
BH CV ECHO MEAS - TAPSE (>1.6): 2.1 CM2
BH CV ECHO MEAS - TR MAX VEL: 238 CM/SEC
BH CV XLRA - RV BASE: 2.6 CM
BH CV XLRA - TDI S': 13 CM/SEC
BUN BLD-MCNC: 46 MG/DL (ref 8–23)
BUN/CREAT SERPL: 31.7 (ref 7–25)
CALCIUM SPEC-SCNC: 9.2 MG/DL (ref 8.6–10.5)
CHLORIDE SERPL-SCNC: 100 MMOL/L (ref 98–107)
CO2 SERPL-SCNC: 24.9 MMOL/L (ref 22–29)
CREAT BLD-MCNC: 1.45 MG/DL (ref 0.57–1)
DEPRECATED RDW RBC AUTO: 43 FL (ref 37–54)
E/E' RATIO: 18
ERYTHROCYTE [DISTWIDTH] IN BLOOD BY AUTOMATED COUNT: 14.9 % (ref 11.7–13)
GFR SERPL CREATININE-BSD FRML MDRD: 35 ML/MIN/1.73
GLUCOSE BLD-MCNC: 160 MG/DL (ref 65–99)
GLUCOSE BLDC GLUCOMTR-MCNC: 159 MG/DL (ref 70–130)
GLUCOSE BLDC GLUCOMTR-MCNC: 188 MG/DL (ref 70–130)
GLUCOSE BLDC GLUCOMTR-MCNC: 205 MG/DL (ref 70–130)
GLUCOSE BLDC GLUCOMTR-MCNC: 66 MG/DL (ref 70–130)
HBA1C MFR BLD: 9.03 % (ref 4.8–5.6)
HCT VFR BLD AUTO: 34 % (ref 35.6–45.5)
HGB BLD-MCNC: 10.8 G/DL (ref 11.9–15.5)
LEFT ATRIUM VOLUME INDEX: 39 ML/M2
LV EF 2D ECHO EST: 65 %
MCH RBC QN AUTO: 25.4 PG (ref 26.9–32)
MCHC RBC AUTO-ENTMCNC: 31.8 G/DL (ref 32.4–36.3)
MCV RBC AUTO: 79.8 FL (ref 80.5–98.2)
PLATELET # BLD AUTO: 226 10*3/MM3 (ref 140–500)
PMV BLD AUTO: 11.5 FL (ref 6–12)
POTASSIUM BLD-SCNC: 4.5 MMOL/L (ref 3.5–5.2)
RBC # BLD AUTO: 4.26 10*6/MM3 (ref 3.9–5.2)
SODIUM BLD-SCNC: 139 MMOL/L (ref 136–145)
TROPONIN T SERPL-MCNC: 0.42 NG/ML (ref 0–0.03)
WBC NRBC COR # BLD: 8.95 10*3/MM3 (ref 4.5–10.7)

## 2017-02-14 PROCEDURE — 63710000001 INSULIN DETEMER PER 5 UNITS: Performed by: INTERNAL MEDICINE

## 2017-02-14 PROCEDURE — 80048 BASIC METABOLIC PNL TOTAL CA: CPT | Performed by: INTERNAL MEDICINE

## 2017-02-14 PROCEDURE — 83036 HEMOGLOBIN GLYCOSYLATED A1C: CPT | Performed by: INTERNAL MEDICINE

## 2017-02-14 PROCEDURE — 82962 GLUCOSE BLOOD TEST: CPT

## 2017-02-14 PROCEDURE — 85027 COMPLETE CBC AUTOMATED: CPT | Performed by: INTERNAL MEDICINE

## 2017-02-14 PROCEDURE — 99233 SBSQ HOSP IP/OBS HIGH 50: CPT | Performed by: INTERNAL MEDICINE

## 2017-02-14 PROCEDURE — 94640 AIRWAY INHALATION TREATMENT: CPT

## 2017-02-14 PROCEDURE — 84484 ASSAY OF TROPONIN QUANT: CPT | Performed by: INTERNAL MEDICINE

## 2017-02-14 PROCEDURE — 94799 UNLISTED PULMONARY SVC/PX: CPT

## 2017-02-14 PROCEDURE — 93005 ELECTROCARDIOGRAM TRACING: CPT | Performed by: INTERNAL MEDICINE

## 2017-02-14 PROCEDURE — 25010000002 PERFLUTREN (DEFINITY) 8.476 MG IN SODIUM CHLORIDE 10 ML INJECTION: Performed by: INTERNAL MEDICINE

## 2017-02-14 PROCEDURE — C8929 TTE W OR WO FOL WCON,DOPPLER: HCPCS

## 2017-02-14 PROCEDURE — 93010 ELECTROCARDIOGRAM REPORT: CPT | Performed by: INTERNAL MEDICINE

## 2017-02-14 PROCEDURE — 93306 TTE W/DOPPLER COMPLETE: CPT | Performed by: INTERNAL MEDICINE

## 2017-02-14 PROCEDURE — 63710000001 INSULIN ASPART PER 5 UNITS: Performed by: INTERNAL MEDICINE

## 2017-02-14 RX ORDER — RISPERIDONE 1 MG/1
1 TABLET, ORALLY DISINTEGRATING ORAL NIGHTLY
Status: DISCONTINUED | OUTPATIENT
Start: 2017-02-14 | End: 2017-02-17 | Stop reason: HOSPADM

## 2017-02-14 RX ORDER — METOPROLOL TARTRATE 50 MG/1
50 TABLET, FILM COATED ORAL EVERY 12 HOURS SCHEDULED
Status: DISCONTINUED | OUTPATIENT
Start: 2017-02-14 | End: 2017-02-17 | Stop reason: HOSPADM

## 2017-02-14 RX ORDER — ASPIRIN 81 MG/1
81 TABLET ORAL DAILY
Status: DISCONTINUED | OUTPATIENT
Start: 2017-02-14 | End: 2017-02-17 | Stop reason: HOSPADM

## 2017-02-14 RX ORDER — ATORVASTATIN CALCIUM 40 MG/1
40 TABLET, FILM COATED ORAL DAILY
Status: DISCONTINUED | OUTPATIENT
Start: 2017-02-14 | End: 2017-02-17 | Stop reason: HOSPADM

## 2017-02-14 RX ORDER — ISOSORBIDE MONONITRATE 30 MG/1
30 TABLET, EXTENDED RELEASE ORAL
Status: DISCONTINUED | OUTPATIENT
Start: 2017-02-14 | End: 2017-02-16

## 2017-02-14 RX ADMIN — FUROSEMIDE 40 MG: 40 TABLET ORAL at 17:54

## 2017-02-14 RX ADMIN — HYDRALAZINE HYDROCHLORIDE 50 MG: 25 TABLET ORAL at 17:54

## 2017-02-14 RX ADMIN — IPRATROPIUM BROMIDE AND ALBUTEROL SULFATE 3 ML: .5; 3 SOLUTION RESPIRATORY (INHALATION) at 07:32

## 2017-02-14 RX ADMIN — HYDRALAZINE HYDROCHLORIDE 50 MG: 25 TABLET ORAL at 12:33

## 2017-02-14 RX ADMIN — INSULIN ASPART 3 UNITS: 100 INJECTION, SOLUTION INTRAVENOUS; SUBCUTANEOUS at 23:12

## 2017-02-14 RX ADMIN — POTASSIUM CHLORIDE 20 MEQ: 750 CAPSULE, EXTENDED RELEASE ORAL at 09:20

## 2017-02-14 RX ADMIN — METOPROLOL TARTRATE 25 MG: 25 TABLET ORAL at 09:21

## 2017-02-14 RX ADMIN — RISPERIDONE 1 MG: 1 TABLET, ORALLY DISINTEGRATING ORAL at 23:12

## 2017-02-14 RX ADMIN — HYDRALAZINE HYDROCHLORIDE 50 MG: 25 TABLET ORAL at 09:20

## 2017-02-14 RX ADMIN — ISOSORBIDE MONONITRATE 30 MG: 30 TABLET, EXTENDED RELEASE ORAL at 12:34

## 2017-02-14 RX ADMIN — ATORVASTATIN CALCIUM 40 MG: 40 TABLET, FILM COATED ORAL at 12:32

## 2017-02-14 RX ADMIN — NITROGLYCERIN 1 INCH: 20 OINTMENT TOPICAL at 06:42

## 2017-02-14 RX ADMIN — PERFLUTREN 6 ML: 6.52 INJECTION, SUSPENSION INTRAVENOUS at 14:00

## 2017-02-14 RX ADMIN — INSULIN ASPART 10 UNITS: 100 INJECTION, SOLUTION INTRAVENOUS; SUBCUTANEOUS at 09:22

## 2017-02-14 RX ADMIN — ASPIRIN 325 MG: 325 TABLET ORAL at 09:20

## 2017-02-14 RX ADMIN — INSULIN ASPART 8 UNITS: 100 INJECTION, SOLUTION INTRAVENOUS; SUBCUTANEOUS at 12:33

## 2017-02-14 RX ADMIN — METOPROLOL TARTRATE 50 MG: 50 TABLET ORAL at 23:12

## 2017-02-14 RX ADMIN — LISINOPRIL 40 MG: 40 TABLET ORAL at 09:21

## 2017-02-14 RX ADMIN — METOPROLOL TARTRATE 25 MG: 25 TABLET ORAL at 00:06

## 2017-02-14 RX ADMIN — HYDRALAZINE HYDROCHLORIDE 50 MG: 25 TABLET ORAL at 23:12

## 2017-02-14 RX ADMIN — INSULIN ASPART 2 UNITS: 100 INJECTION, SOLUTION INTRAVENOUS; SUBCUTANEOUS at 09:22

## 2017-02-14 RX ADMIN — FUROSEMIDE 40 MG: 40 TABLET ORAL at 09:21

## 2017-02-14 RX ADMIN — INSULIN ASPART 2 UNITS: 100 INJECTION, SOLUTION INTRAVENOUS; SUBCUTANEOUS at 12:33

## 2017-02-14 NOTE — PROGRESS NOTES
"DAILY PROGRESS NOTE  [facility]      Patient Identification:  Name: Britney Deras  Age: 81 y.o.  Sex: female  :  1935  MRN: [id number]         Primary Care Physician: Gustavo Mcgrath MD    Subjective:  Interval History:    Ms. Deras is sleeping this morning, but arouses readily. She denies SOA, CP, N/V/D, but she is easily aggravated and quite paranoid at baseline, so not always consistent with history nor with compliance. She seems fairly calm this morning. Discussed the findings in the ED with her- alerted her to the fact that cardiology may need to due further testing and at this moment, she is agreeable, if needed. She has audible wheeze, but currently blaming that on the \"breathing treatment\".     Objective:    Scheduled Meds:  aspirin 325 mg Oral Daily   CloNIDine 1 patch Transdermal Weekly   enoxaparin 1 mg/kg Subcutaneous Q12H   furosemide 40 mg Oral BID   hydrALAZINE 50 mg Oral 4x Daily   insulin aspart 0-7 Units Subcutaneous 4x Daily AC & at Bedtime   insulin aspart 10 Units Subcutaneous QAM AC   insulin aspart 10 Units Subcutaneous Daily Before Supper   insulin aspart 8 Units Subcutaneous Daily Before Lunch   insulin detemir 10 Units Subcutaneous Nightly   ipratropium-albuterol 3 mL Nebulization 4x Daily - RT   lisinopril 40 mg Oral Daily   metoprolol tartrate 25 mg Oral Q12H   nitroglycerin 1 inch Topical Q6H   potassium chloride 20 mEq Oral Daily   risperiDONE 0.5 mg Oral Nightly     Continuous Infusions:  Pharmacy to Dose enoxaparin (LOVENOX)        Vital signs in last 24 hours:  Temp:  [97.2 °F (36.2 °C)-98.8 °F (37.1 °C)] 98.3 °F (36.8 °C)  Heart Rate:  [61-96] 61  Resp:  [16-22] 20  BP: (110-213)/(49-97) 110/49    tMax 24 hrs: Temp (24hrs), Av °F (36.7 °C), Min:97.2 °F (36.2 °C), Max:98.8 °F (37.1 °C)      Intake/Output:  No intake or output data in the 24 hours ending 17 0907    Exam:    Visit Vitals   • /49 (BP " "Location: Right arm, Patient Position: Lying)   • Pulse 61   • Temp 98.3 °F (36.8 °C) (Oral)   • Resp 20   • Ht 64.5\" (163.8 cm)   • Wt 225 lb 14.4 oz (102 kg)   • SpO2 94%   • BMI 38.18 kg/m2       General: Alert, oriented x 2. Cooperative at present, no distress, appears stated age  Neck: Supple, symmetrical, trachea midline, no adenopathy;   thyroid: no enlargement/tenderness/nodules;   no carotid bruit or JVD  Cardiovascular: Normal rate, regular rhythm and intact distal pulses.  Exam reveals no gallop and no friction rub. No murmur heard  Pulmonary: Diminished BS. No rhonchi, rales. Rare expiratory wheeze.   Abdominal: Soft. Non-tender, bowel sounds active all four quadrants,   no masses, no hepatomegaly, no splenomegaly.   Extremities: No cyanosis. Edema of LE ++, diminished peripheral pulses  Pulses: 1 + symmetric all extremities  Neurological: She is alert and oriented to person, place.   CNII-XII intact, normal strength, sensation intact throughout  Skin: Skin color, texture, turgor normal, no rashes or lesions      Data Review:      Results from last 7 days  Lab Units 02/14/17  0415 02/13/17  1311   WBC 10*3/mm3 8.95 7.46   HEMOGLOBIN g/dL 10.8* 11.3*   HEMATOCRIT % 34.0* 36.0   PLATELETS 10*3/mm3 226 231         Results from last 7 days  Lab Units 02/14/17  0415 02/13/17  1311   SODIUM mmol/L 139 141   POTASSIUM mmol/L 4.5 4.3   CHLORIDE mmol/L 100 99   TOTAL CO2 mmol/L 24.9 26.3   BUN mg/dL 46* 45*   CREATININE mg/dL 1.45* 1.39*   CALCIUM mg/dL 9.2 9.7   BILIRUBIN mg/dL  --  <0.2   ALK PHOS U/L  --  118*   ALT (SGPT) U/L  --  21   AST (SGOT) U/L  --  19   GLUCOSE mg/dL 160* 301*             0  Lab Value Date/Time   TROPONINT 0.424 (C) 02/14/2017 0415   TROPONINT 0.261 (C) 02/13/2017 1311       Microbiology Results (last 10 days)     ** No results found for the last 240 hours. **           Imaging Results (last 7 days)     Procedure Component Value Units Date/Time    XR Chest 2 View [[id number]] Collected:  " 02/13/17 1331     Updated:  02/13/17 1335    Narrative:       XR CHEST 2 VW-     Clinical: Chest pain, shortness of breath     FINDINGS: There is atherosclerotic calcification of the aorta. Cardiac  size upper limits of normal. Prominence of the pulmonary vasculature  consistent with mild to moderate congestion. Atypical pneumonia is felt  to be less likely.No pleural effusion demonstrated.  Few small calcified  benign-appearing mediastinal and hilar lymph nodes noted. The remainder  is unremarkable.     This report was finalized on 2/13/2017 1:32 PM by Dr. Blas Wells MD.           2D-ECHO  · All left ventricular wall segments contract normally.  · Left ventricular wall thickness is consistent with mild concentric hypertrophy.  · Left ventricular function is normal. Estimated EF = 65%.  · Left ventricular diastolic dysfunction (grade II) consistent with pseudonormalization.  · Left atrial cavity size is moderately dilated.    Assessment:      Principal Problem:    NSTEMI (non-ST elevated myocardial infarction)  Active Problems:    CHF (congestive heart failure)    Type II diabetes mellitus, uncontrolled    Hyperlipidemia    HTN (hypertension)    Dementia    Schizoaffective disorder    CAD (coronary artery disease)      Patient Active Problem List   Diagnosis Code   • NSTEMI (non-ST elevated myocardial infarction) I21.4   • CHF (congestive heart failure) I50.9   • Type II diabetes mellitus, uncontrolled E11.65   • Hyperlipidemia E78.5   • HTN (hypertension) I10   • Dementia F03.90   • Schizoaffective disorder F25.9   • CAD (coronary artery disease) I25.10       Plan:    Follow cardiology recommendations  Diet: CCHO, regular consistency  Monitor and correct electrolytes  Monitor mental status closely - extremely  reclusive and non- compliant in nursing home.   She DOES do somewhat better when given early warning about tests, exams, etc.  IF choice can be given  And she feels she is directing care, she will  cooperate more consistently.   Adjust BP medicines  Continue Accuchecks and SSI  PT consult  DVT/stress ulcer prophylaxis  Labs in am      Gustavo Mcgrath MD  2/14/2017        Much of this encounter note is an electronic transcription/translation of spoken language to printed text. The electronic translation of spoken language may permit erroneous, or at times, nonsensical words or phrases to be inadvertently transcribed; Although I have reviewed the note for such errors, some may still exist

## 2017-02-14 NOTE — CONSULTS
Date of Hospital Visit: 17  Encounter Provider: Aaron Corcoran MD  Place of Service: [facility] CARDIOLOGY  Patient Name: Britney Deras  :1935  [id number]  Referral Provider: Gustavo Mcgrath MD    Chief complaint:cp    History of Present Illness:   This is an 81-year-old  woman with diabetes and hypertension, lives at a nursing home, smells of urine, has a history of dementia and schizoaffective disorder. Evidently had chest discomfort today, was brought to the emergency room. She had some shortness of breath. Initial troponins elevated at 0.26. She has never had coronary disease that she knows of before. She does not smoke. She does not have bleeding difficulty.             Past Medical History   Diagnosis Date   • Angina pectoris    • Asthma    • Atherosclerosis    • CHF (congestive heart failure)    • Depression    • Diabetes mellitus    • GERD (gastroesophageal reflux disease)    • Hyperlipidemia    • Hypertension    • TIA (transient ischemic attack)        History reviewed. No pertinent past surgical history.    Prescriptions Prior to Admission   Medication Sig Dispense Refill Last Dose   • acetaminophen (TYLENOL) 325 MG tablet Take 650 mg by mouth Every 4 (Four) Hours As Needed for mild pain (1-3).      • aspirin 325 MG tablet Take 325 mg by mouth Daily.      • CloNIDine (CATAPRES-TTS) 0.2 MG/24HR patch Place 1 patch on the skin 1 (One) Time Per Week.      • furosemide (LASIX) 40 MG tablet Take 40 mg by mouth 2 (Two) Times a Day.      • hydrALAZINE (APRESOLINE) 25 MG tablet Take 25 mg by mouth 4 (Four) Times a Day.      • insulin aspart (novoLOG) 100 UNIT/ML injection Inject 10 Units under the skin Every Morning Before Breakfast.      • insulin aspart (novoLOG) 100 UNIT/ML injection Inject 8 Units under the skin Daily Before Lunch.      • insulin aspart (novoLOG) 100 UNIT/ML injection Inject 10 Units under the skin Daily Before Supper.      •  insulin detemir (LEVEMIR) 100 UNIT/ML injection Inject 12 Units under the skin Every Night.      • ipratropium-albuterol (DUO-NEB) 0.5-2.5 mg/mL nebulizer Take 3 mL by nebulization 4 (Four) Times a Day.      • isosorbide mononitrate (IMDUR) 60 MG 24 hr tablet Take 60 mg by mouth Daily.      • lisinopril (PRINIVIL,ZESTRIL) 40 MG tablet Take 40 mg by mouth Daily.      • magnesium hydroxide (MILK OF MAGNESIA) 400 MG/5ML suspension Take 30 mL by mouth Daily As Needed for constipation.      • metOLazone (ZAROXOLYN) 5 MG tablet Take 5 mg by mouth Daily.      • nitroglycerin (NITROSTAT) 0.4 MG SL tablet Place 0.4 mg under the tongue Every 5 (Five) Minutes As Needed for chest pain. Take no more than 3 doses in 15 minutes.      • potassium chloride (K-DUR,KLOR-CON) 20 MEQ CR tablet Take 40 mEq by mouth Daily.      • risperiDONE (risperDAL M-TABS) 0.5 MG disintegrating tablet Take 0.5 mg by mouth Every Night.      • traMADol (ULTRAM) 50 MG tablet Take 50 mg by mouth Every 6 (Six) Hours As Needed for moderate pain (4-6).      • vitamin D (ERGOCALCIFEROL) 36933 UNITS capsule capsule Take 50,000 Units by mouth 3 (Three) Times a Week. On Monday, Wednesday, and Friday          Current Meds  Scheduled Meds:  [START ON 2/14/2017] aspirin 325 mg Oral Daily   CloNIDine 1 patch Transdermal Weekly   enoxaparin 1 mg/kg Subcutaneous Q12H   furosemide 40 mg Oral BID   hydrALAZINE 25 mg Oral 4x Daily   insulin aspart 0-7 Units Subcutaneous 4x Daily AC & at Bedtime   [START ON 2/14/2017] insulin aspart 10 Units Subcutaneous QAM AC   insulin aspart 10 Units Subcutaneous Daily Before Supper   [START ON 2/14/2017] insulin aspart 8 Units Subcutaneous Daily Before Lunch   insulin detemir 10 Units Subcutaneous Nightly   ipratropium-albuterol 3 mL Nebulization 4x Daily - RT   lisinopril 40 mg Oral Daily   nitroglycerin 1 inch Topical Q6H   potassium chloride 20 mEq Oral Daily   risperiDONE 0.5 mg Oral Nightly     Continuous Infusions:  Pharmacy to  Dose enoxaparin (LOVENOX)      PRN Meds:.•  acetaminophen  •  dextrose  •  dextrose  •  glucagon (human recombinant)  •  nitroglycerin  •  Pharmacy to Dose enoxaparin (LOVENOX)  •  sodium chloride  •  traMADol    Allergies as of 02/13/2017   • (No Known Allergies)       Social History     Social History   • Marital status: Single     Spouse name: N/A   • Number of children: N/A   • Years of education: N/A     Occupational History   • Not on file.     Social History Main Topics   • Smoking status: Former Smoker   • Smokeless tobacco: Not on file   • Alcohol use Not on file   • Drug use: Not on file   • Sexual activity: Not on file     Other Topics Concern   • Not on file     Social History Narrative   • No narrative on file       History reviewed. No pertinent family history.    REVIEW OF SYSTEMS:   ROS was performed and is negative except as outlined in HPI     REVIEW OF SYSTEMS:   CONSTITUTIONAL: No weight loss, fever, chills, weakness or fatigue.   HEENT: Eyes: No visual loss, blurred vision, double vision or yellow sclerae. Ears, Nose, Throat: No hearing loss, sneezing, congestion, runny nose or sore throat.   SKIN: No rash or itching.     RESPIRATORY: No shortness of breath, hemoptysis, cough or sputum.   GASTROINTESTINAL: No anorexia, nausea, vomiting or diarrhea. No abdominal pain, bright red blood per rectum or melena.  GENITOURINARY: No burning on urination, hematuria or increased frequency.  NEUROLOGICAL: No headache, dizziness, syncope, paralysis, ataxia, numbness or tingling in the extremities. No change in bowel or bladder control.   MUSCULOSKELETAL: No muscle, back pain, joint pain or stiffness.   HEMATOLOGIC: No anemia, bleeding or bruising.   LYMPHATICS: No enlarged nodes. No history of splenectomy.   PSYCHIATRIC: No history of depression, anxiety, hallucinations.   ENDOCRINOLOGIC: No reports of sweating, cold or heat intolerance. No polyuria or polydipsia.       Objective:   Temp:  [97.2 °F (36.2  "°C)-98.8 °F (37.1 °C)] 98.4 °F (36.9 °C)  Heart Rate:  [78-96] 83  Resp:  [16-22] 18  BP: (150-213)/(56-97) 171/56  Body mass index is 40.56 kg/(m^2).  Flowsheet Rows         First Filed Value    Admission Height  64.5\" (163.8 cm) Documented at 02/13/2017 1314    Admission Weight  240 lb (109 kg) Documented at 02/13/2017 1314        Vitals:    02/13/17 2017   BP: 171/56   Pulse: 83   Resp: 18   Temp: 98.4 °F (36.9 °C)   SpO2: 99%       Head:    Normocephalic, without obvious abnormality, atraumatic   Eyes:            Lids and lashes normal, conjunctivae and sclerae normal, no   icterus, no pallor   Ears:    Ears appear intact with no abnormalities noted   Throat:   No oral lesions, dentition good   Neck:   No adenopathy, supple, trachea midline, no thyromegaly, no   carotid bruit, no JVD   Lungs:     Breath sounds are equal and clear to auscultation    Heart:    Normal S1 and S2, RRR, No M/G/R   Abdomen:     Normal bowel sounds, no masses, no organomegaly, soft        non-tender, non-distended, no guarding   Extremities:   Moves all extremities well, no edema, no cyanosis, no redness   Pulses:   Pulses palpable and equal bilaterally.    Skin:  Psychiatric:   No bleeding, bruising or rash    Awake, alert and oriented x 3, normal mood and affect             I personally viewed and interpreted the patient's EKG/Telemetry data  )  Patient Active Problem List   Diagnosis   • NSTEMI (non-ST elevated myocardial infarction)     Assessment and Plan:   This is a pretty bad situation with a lady with dementia, chest discomfort. It looks like she has probably had a non-STEMI. I am going to try and manage her conservatively with medications and stress testing to see what her degree of myocardium is at risk. She is hypertensive so we want to get her blood pressure under control. I am not planning on taking an invasive approach with her.         Aaron Corcoran MD  02/13/17  10:11 PM.  "

## 2017-02-14 NOTE — PLAN OF CARE
Problem: Patient Care Overview (Adult)  Goal: Plan of Care Review  Outcome: Ongoing (interventions implemented as appropriate)    02/14/17 1601   Coping/Psychosocial Response Interventions   Plan Of Care Reviewed With patient;spouse   Patient Care Overview   Progress no change   Outcome Evaluation   Outcome Summary/Follow up Plan Patient up to bathroom with one assist. Lipitor and Imdur added. Nitroglycerin discontinued. Echo completed pending results. Vitals stable. Will continue to monitor.         Problem: Fall Risk (Adult)  Goal: Absence of Falls  Outcome: Ongoing (interventions implemented as appropriate)    Problem: Diabetes, Type 2 (Adult)  Goal: Signs and Symptoms of Listed Potential Problems Will be Absent or Manageable (Diabetes, Type 2)  Outcome: Ongoing (interventions implemented as appropriate)

## 2017-02-14 NOTE — PLAN OF CARE
Problem: Patient Care Overview (Adult)  Goal: Plan of Care Review  Outcome: Ongoing (interventions implemented as appropriate)    02/14/17 0428   Coping/Psychosocial Response Interventions   Plan Of Care Reviewed With patient   Patient Care Overview   Progress no change   Outcome Evaluation   Outcome Summary/Follow up Plan no c/o chest pain, meds per md order- pt refusing lovenox, vss, new bp medication added, awaiting morning labs, echo and ekg, will continue to monitor       Goal: Adult Individualization and Mutuality  Outcome: Ongoing (interventions implemented as appropriate)  Goal: Discharge Needs Assessment  Outcome: Ongoing (interventions implemented as appropriate)    Problem: Fall Risk (Adult)  Goal: Identify Related Risk Factors and Signs and Symptoms  Outcome: Outcome(s) achieved Date Met:  02/14/17  Goal: Absence of Falls  Outcome: Ongoing (interventions implemented as appropriate)    Problem: Diabetes, Type 2 (Adult)  Goal: Signs and Symptoms of Listed Potential Problems Will be Absent or Manageable (Diabetes, Type 2)  Outcome: Ongoing (interventions implemented as appropriate)

## 2017-02-14 NOTE — PROGRESS NOTES
"Britney Deras  1935 81 y.o.  [id number]      Patient Care Team:  Gustavo Mcgrath MD as PCP - General (Internal Medicine)    CC: Non-STEMI, renal insufficiency, hypertension, diabetes,    Interval History: No further chest pain no shortness of breath      Objective   Vital Signs  Temp:  [97.2 °F (36.2 °C)-98.8 °F (37.1 °C)] 98.3 °F (36.8 °C)  Heart Rate:  [61-96] 61  Resp:  [16-22] 20  BP: (110-213)/(49-97) 110/49  No intake or output data in the 24 hours ending 02/14/17 1016  Flowsheet Rows         First Filed Value    Admission Height  64.5\" (163.8 cm) Documented at 02/13/2017 1314    Admission Weight  240 lb (109 kg) Documented at 02/13/2017 1314          Physical Exam:   General Appearance:    Alert,oriented, in no acute distress   Lungs:     Clear to auscultation,BS are equal    Heart:    Normal S1 and S2, RRR without murmur, gallop or rub   HEENT:    Sclera are clear, no JVD or adenopathy   Abdomen:     Normal bowel sounds, soft non-tender, non-distended, no HSM   Extremities:   Moves all extremities well, no edema, no cyanosis, no             Redness, no rash     Medication Review:        aspirin 325 mg Oral Daily   CloNIDine 1 patch Transdermal Weekly   enoxaparin 1 mg/kg Subcutaneous Q12H   furosemide 40 mg Oral BID   hydrALAZINE 50 mg Oral 4x Daily   insulin aspart 0-7 Units Subcutaneous 4x Daily AC & at Bedtime   insulin aspart 10 Units Subcutaneous QAM AC   insulin aspart 10 Units Subcutaneous Daily Before Supper   insulin aspart 8 Units Subcutaneous Daily Before Lunch   insulin detemir 10 Units Subcutaneous Nightly   ipratropium-albuterol 3 mL Nebulization 4x Daily - RT   lisinopril 40 mg Oral Daily   metoprolol tartrate 25 mg Oral Q12H   nitroglycerin 1 inch Topical Q6H   potassium chloride 20 mEq Oral Daily   risperiDONE 0.5 mg Oral Nightly       Pharmacy to Dose enoxaparin (LOVENOX)          I reviewed the patient's new clinical results.  I personally viewed and interpreted the patient's " EKG/Telemetry data    Assessment/Plan  This is a elderly lady with dementia and multiple cardiac risk factors schizophrenic disease who comes in with a non-STEMI A don't think she is a good candidate for invasive therapy and like to try and manage her conservatively I'm going to increase her metoprolol like her to get a PET stress test tomorrow because of her body habitus if she has a small amount of myocardium at risk we will just try to manage her medically think we could just give her 81 mg of aspirin a day also and I will try her on a statin    Aaron Corcoran MD  02/14/17  10:16 AM

## 2017-02-15 LAB
ANION GAP SERPL CALCULATED.3IONS-SCNC: 12.5 MMOL/L
BASOPHILS # BLD AUTO: 0.01 10*3/MM3 (ref 0–0.2)
BASOPHILS NFR BLD AUTO: 0.1 % (ref 0–1.5)
BUN BLD-MCNC: 50 MG/DL (ref 8–23)
BUN/CREAT SERPL: 33.1 (ref 7–25)
CALCIUM SPEC-SCNC: 8.5 MG/DL (ref 8.6–10.5)
CHLORIDE SERPL-SCNC: 103 MMOL/L (ref 98–107)
CO2 SERPL-SCNC: 24.5 MMOL/L (ref 22–29)
CREAT BLD-MCNC: 1.51 MG/DL (ref 0.57–1)
DEPRECATED RDW RBC AUTO: 43.2 FL (ref 37–54)
EOSINOPHIL # BLD AUTO: 0.25 10*3/MM3 (ref 0–0.7)
EOSINOPHIL NFR BLD AUTO: 3.3 % (ref 0.3–6.2)
ERYTHROCYTE [DISTWIDTH] IN BLOOD BY AUTOMATED COUNT: 14.9 % (ref 11.7–13)
GFR SERPL CREATININE-BSD FRML MDRD: 33 ML/MIN/1.73
GLUCOSE BLD-MCNC: 186 MG/DL (ref 65–99)
GLUCOSE BLDC GLUCOMTR-MCNC: 104 MG/DL (ref 70–130)
GLUCOSE BLDC GLUCOMTR-MCNC: 188 MG/DL (ref 70–130)
GLUCOSE BLDC GLUCOMTR-MCNC: 97 MG/DL (ref 70–130)
GLUCOSE BLDC GLUCOMTR-MCNC: 99 MG/DL (ref 70–130)
HCT VFR BLD AUTO: 32 % (ref 35.6–45.5)
HGB BLD-MCNC: 10 G/DL (ref 11.9–15.5)
IMM GRANULOCYTES # BLD: 0 10*3/MM3 (ref 0–0.03)
IMM GRANULOCYTES NFR BLD: 0 % (ref 0–0.5)
LYMPHOCYTES # BLD AUTO: 2.7 10*3/MM3 (ref 0.9–4.8)
LYMPHOCYTES NFR BLD AUTO: 36.1 % (ref 19.6–45.3)
MCH RBC QN AUTO: 25 PG (ref 26.9–32)
MCHC RBC AUTO-ENTMCNC: 31.3 G/DL (ref 32.4–36.3)
MCV RBC AUTO: 80 FL (ref 80.5–98.2)
MONOCYTES # BLD AUTO: 0.46 10*3/MM3 (ref 0.2–1.2)
MONOCYTES NFR BLD AUTO: 6.2 % (ref 5–12)
NEUTROPHILS # BLD AUTO: 4.05 10*3/MM3 (ref 1.9–8.1)
NEUTROPHILS NFR BLD AUTO: 54.3 % (ref 42.7–76)
PLATELET # BLD AUTO: 207 10*3/MM3 (ref 140–500)
PMV BLD AUTO: 11.9 FL (ref 6–12)
POTASSIUM BLD-SCNC: 4.4 MMOL/L (ref 3.5–5.2)
RBC # BLD AUTO: 4 10*6/MM3 (ref 3.9–5.2)
SODIUM BLD-SCNC: 140 MMOL/L (ref 136–145)
TROPONIN T SERPL-MCNC: 0.26 NG/ML (ref 0–0.03)
WBC NRBC COR # BLD: 7.47 10*3/MM3 (ref 4.5–10.7)

## 2017-02-15 PROCEDURE — 80048 BASIC METABOLIC PNL TOTAL CA: CPT | Performed by: NURSE PRACTITIONER

## 2017-02-15 PROCEDURE — 99232 SBSQ HOSP IP/OBS MODERATE 35: CPT | Performed by: INTERNAL MEDICINE

## 2017-02-15 PROCEDURE — 82962 GLUCOSE BLOOD TEST: CPT

## 2017-02-15 PROCEDURE — 63710000001 INSULIN ASPART PER 5 UNITS: Performed by: INTERNAL MEDICINE

## 2017-02-15 PROCEDURE — 84484 ASSAY OF TROPONIN QUANT: CPT | Performed by: INTERNAL MEDICINE

## 2017-02-15 PROCEDURE — 85025 COMPLETE CBC W/AUTO DIFF WBC: CPT | Performed by: NURSE PRACTITIONER

## 2017-02-15 PROCEDURE — 93005 ELECTROCARDIOGRAM TRACING: CPT | Performed by: INTERNAL MEDICINE

## 2017-02-15 PROCEDURE — 93010 ELECTROCARDIOGRAM REPORT: CPT | Performed by: INTERNAL MEDICINE

## 2017-02-15 RX ADMIN — FUROSEMIDE 40 MG: 40 TABLET ORAL at 17:17

## 2017-02-15 RX ADMIN — FUROSEMIDE 40 MG: 40 TABLET ORAL at 09:01

## 2017-02-15 RX ADMIN — HYDRALAZINE HYDROCHLORIDE 50 MG: 25 TABLET ORAL at 09:01

## 2017-02-15 RX ADMIN — METOPROLOL TARTRATE 50 MG: 50 TABLET ORAL at 21:35

## 2017-02-15 RX ADMIN — METOPROLOL TARTRATE 50 MG: 50 TABLET ORAL at 09:01

## 2017-02-15 RX ADMIN — RISPERIDONE 1 MG: 1 TABLET, ORALLY DISINTEGRATING ORAL at 21:35

## 2017-02-15 RX ADMIN — LISINOPRIL 40 MG: 40 TABLET ORAL at 09:01

## 2017-02-15 RX ADMIN — POTASSIUM CHLORIDE 20 MEQ: 750 CAPSULE, EXTENDED RELEASE ORAL at 09:01

## 2017-02-15 RX ADMIN — HYDRALAZINE HYDROCHLORIDE 50 MG: 25 TABLET ORAL at 17:17

## 2017-02-15 RX ADMIN — INSULIN ASPART 10 UNITS: 100 INJECTION, SOLUTION INTRAVENOUS; SUBCUTANEOUS at 09:01

## 2017-02-15 RX ADMIN — INSULIN ASPART 8 UNITS: 100 INJECTION, SOLUTION INTRAVENOUS; SUBCUTANEOUS at 12:09

## 2017-02-15 RX ADMIN — ISOSORBIDE MONONITRATE 30 MG: 30 TABLET, EXTENDED RELEASE ORAL at 09:01

## 2017-02-15 RX ADMIN — ATORVASTATIN CALCIUM 40 MG: 40 TABLET, FILM COATED ORAL at 09:01

## 2017-02-15 RX ADMIN — ASPIRIN 81 MG: 81 TABLET ORAL at 09:01

## 2017-02-15 RX ADMIN — HYDRALAZINE HYDROCHLORIDE 50 MG: 25 TABLET ORAL at 12:10

## 2017-02-15 RX ADMIN — INSULIN ASPART 10 UNITS: 100 INJECTION, SOLUTION INTRAVENOUS; SUBCUTANEOUS at 17:17

## 2017-02-15 RX ADMIN — INSULIN ASPART 2 UNITS: 100 INJECTION, SOLUTION INTRAVENOUS; SUBCUTANEOUS at 09:02

## 2017-02-15 RX ADMIN — HYDRALAZINE HYDROCHLORIDE 50 MG: 25 TABLET ORAL at 21:35

## 2017-02-15 NOTE — PLAN OF CARE
Problem: Patient Care Overview (Adult)  Goal: Plan of Care Review  Outcome: Ongoing (interventions implemented as appropriate)    02/15/17 1540   Coping/Psychosocial Response Interventions   Plan Of Care Reviewed With patient   Patient Care Overview   Progress no change   Outcome Evaluation   Outcome Summary/Follow up Plan No c/o pain or chest pain, no falls, VSS, pt refusing levimir and lovenox, will continue to monitor         Problem: Fall Risk (Adult)  Goal: Absence of Falls  Outcome: Ongoing (interventions implemented as appropriate)    02/15/17 1540   Fall Risk (Adult)   Absence of Falls achieves outcome         Problem: Diabetes, Type 2 (Adult)  Intervention: Support/Optimize Psychosocial Response to Condition    02/15/17 0903   Coping Strategies   Family/Support System Care support provided   Supportive Measures active listening utilized       Intervention: Optimize Glycemic Control    02/15/17 0903   Nutrition Interventions   Glycemic Management blood glucose monitoring         Goal: Signs and Symptoms of Listed Potential Problems Will be Absent or Manageable (Diabetes, Type 2)  Outcome: Ongoing (interventions implemented as appropriate)

## 2017-02-15 NOTE — PLAN OF CARE
Problem: Patient Care Overview (Adult)  Goal: Plan of Care Review  Outcome: Ongoing (interventions implemented as appropriate)    02/15/17 0418   Coping/Psychosocial Response Interventions   Plan Of Care Reviewed With patient   Outcome Evaluation   Outcome Summary/Follow up Plan vss, no c/o pain or chest pain, pt still refusing levimir and lovenox, choices provided to help her feel more in control and increase compliance, will continue to monitor       Goal: Adult Individualization and Mutuality  Outcome: Ongoing (interventions implemented as appropriate)  Goal: Discharge Needs Assessment  Outcome: Ongoing (interventions implemented as appropriate)    Problem: Fall Risk (Adult)  Goal: Absence of Falls  Outcome: Ongoing (interventions implemented as appropriate)    Problem: Diabetes, Type 2 (Adult)  Goal: Signs and Symptoms of Listed Potential Problems Will be Absent or Manageable (Diabetes, Type 2)  Outcome: Ongoing (interventions implemented as appropriate)

## 2017-02-15 NOTE — PROGRESS NOTES
"Britney Deras  1935 81 y.o.  [id number]      Patient Care Team:  Gustavo Mcgrath MD as PCP - General (Internal Medicine)    CC: NSTEMI, nl lvsf    Interval History: no cp and bp is better      Objective   Vital Signs  Temp:  [97.9 °F (36.6 °C)-98.4 °F (36.9 °C)] 98 °F (36.7 °C)  Heart Rate:  [60-83] 61  Resp:  [18-20] 18  BP: (114-181)/(45-58) 124/51  No intake or output data in the 24 hours ending 02/15/17 1747  Flowsheet Rows         First Filed Value    Admission Height  64.5\" (163.8 cm) Documented at 02/13/2017 1314    Admission Weight  240 lb (109 kg) Documented at 02/13/2017 1314          Physical Exam:   General Appearance:    Alert,oriented, in no acute distress   Lungs:     Clear to auscultation,BS are equal    Heart:    Normal S1 and S2, RRR without murmur, gallop or rub   HEENT:    Sclera are clear, no JVD or adenopathy   Abdomen:     Normal bowel sounds, soft non-tender, non-distended, no HSM   Extremities:   Moves all extremities well, no edema, no cyanosis, no             Redness, no rash     Medication Review:        aspirin 81 mg Oral Daily   atorvastatin 40 mg Oral Daily   CloNIDine 1 patch Transdermal Weekly   enoxaparin 1 mg/kg Subcutaneous Q12H   furosemide 40 mg Oral BID   hydrALAZINE 50 mg Oral 4x Daily   insulin aspart 0-7 Units Subcutaneous 4x Daily AC & at Bedtime   insulin aspart 10 Units Subcutaneous QAM AC   insulin aspart 10 Units Subcutaneous Daily Before Supper   insulin aspart 8 Units Subcutaneous Daily Before Lunch   insulin detemir 10 Units Subcutaneous Nightly   ipratropium-albuterol 3 mL Nebulization 4x Daily - RT   isosorbide mononitrate 30 mg Oral Q24H   lisinopril 40 mg Oral Daily   metoprolol tartrate 50 mg Oral Q12H   potassium chloride 20 mEq Oral Daily   risperiDONE 1 mg Oral Nightly       Pharmacy to Dose enoxaparin (LOVENOX)          I reviewed the patient's new clinical results.  I personally viewed and interpreted the patient's EKG/Telemetry " data    Assessment/Plan   Trying to maximize medical therapy.  She has got dementia and would not be a good invasive candidate.  I am going to get a stress test tomorrow to restratify her.  She has a lot of myocardial risk we would have to consider a cath, but I would like to try and manage her medically if at all possible.  If her stress is okay tomorrow, she could go home.        Aaron Corcoran MD  02/15/17  5:47 PM

## 2017-02-15 NOTE — PROGRESS NOTES
"DAILY PROGRESS NOTE  [facility], [facility]      Patient Identification:  Name: Britney Deras  Age: 81 y.o.  Sex: female  :  1935  MRN: [id number]         Primary Care Physician: Gustavo Mcgrath MD    Subjective:  Interval History:  Ms. Deras is sleeping this morning, but arouses readily. She denies CP, SOA, N/V/D. She has been refusing her levemir and lovenox. Unfortunately, refusal of insulin is not new- frequently does so in nursing home. When asked if she understands that she has had MI, she responds with \" I've never had any heart problems\" and looks confused. Discusssed cardiology recommendations with her and she appears to be more accepting of diagnosis at present.     Objective:    Scheduled Meds:  aspirin 81 mg Oral Daily   atorvastatin 40 mg Oral Daily   CloNIDine 1 patch Transdermal Weekly   enoxaparin 1 mg/kg Subcutaneous Q12H   furosemide 40 mg Oral BID   hydrALAZINE 50 mg Oral 4x Daily   insulin aspart 0-7 Units Subcutaneous 4x Daily AC & at Bedtime   insulin aspart 10 Units Subcutaneous QAM AC   insulin aspart 10 Units Subcutaneous Daily Before Supper   insulin aspart 8 Units Subcutaneous Daily Before Lunch   insulin detemir 10 Units Subcutaneous Nightly   ipratropium-albuterol 3 mL Nebulization 4x Daily - RT   isosorbide mononitrate 30 mg Oral Q24H   lisinopril 40 mg Oral Daily   metoprolol tartrate 50 mg Oral Q12H   potassium chloride 20 mEq Oral Daily   risperiDONE 1 mg Oral Nightly     Continuous Infusions:  Pharmacy to Dose enoxaparin (LOVENOX)        Vital signs in last 24 hours:  Temp:  [97.7 °F (36.5 °C)-98.4 °F (36.9 °C)] 98.4 °F (36.9 °C)  Heart Rate:  [58-83] 60  Resp:  [18-20] 20  BP: (114-181)/(44-57) 114/45    tMax 24 hrs: Temp (24hrs), Av.1 °F (36.7 °C), Min:97.7 °F (36.5 °C), Max:98.4 °F (36.9 °C)      Intake/Output:  No intake or output data in the 24 hours ending 02/15/17 1033    Exam:    Visit Vitals   • /45 (BP " "Location: Right arm, Patient Position: Lying)   • Pulse 60   • Temp 98.4 °F (36.9 °C) (Oral)   • Resp 20   • Ht 64.5\" (163.8 cm)   • Wt 219 lb (99.3 kg)   • SpO2 94%   • BMI 37.01 kg/m2       General: Alert, oriented x 2. Cooperative at present, no distress, appears stated age  Neck: Supple, symmetrical, trachea midline, no adenopathy;   thyroid: no enlargement/tenderness/nodules;   no carotid bruit or JVD  Cardiovascular: Normal rate, regular rhythm and intact distal pulses.  Exam reveals no gallop and no friction rub. No murmur heard  Pulmonary: Diminished BS. No rhonchi, rales. Rare expiratory wheeze.   Abdominal: Soft. Non-tender, bowel sounds active all four quadrants,   no masses, no hepatomegaly, no splenomegaly.   Extremities: No cyanosis. Edema of LE ++, diminished peripheral pulses  Pulses: 1 + symmetric all extremities  Neurological: She is alert and oriented to person, place.   CNII-XII intact, normal strength, sensation intact throughout  Skin: Skin color, texture, turgor normal, no rashes or lesions      Data Review:      Results from last 7 days  Lab Units 02/15/17  0527 02/14/17  0415 02/13/17  1311   WBC 10*3/mm3 7.47 8.95 7.46   HEMOGLOBIN g/dL 10.0* 10.8* 11.3*   HEMATOCRIT % 32.0* 34.0* 36.0   PLATELETS 10*3/mm3 207 226 231         Results from last 7 days  Lab Units 02/15/17  0527 02/14/17  0415 02/13/17  1311   SODIUM mmol/L 140 139 141   POTASSIUM mmol/L 4.4 4.5 4.3   CHLORIDE mmol/L 103 100 99   TOTAL CO2 mmol/L 24.5 24.9 26.3   BUN mg/dL 50* 46* 45*   CREATININE mg/dL 1.51* 1.45* 1.39*   CALCIUM mg/dL 8.5* 9.2 9.7   BILIRUBIN mg/dL  --   --  <0.2   ALK PHOS U/L  --   --  118*   ALT (SGPT) U/L  --   --  21   AST (SGOT) U/L  --   --  19   GLUCOSE mg/dL 186* 160* 301*             0  Lab Value Date/Time   TROPONINT 0.264 (C) 02/15/2017 0527   TROPONINT 0.424 (C) 02/14/2017 0415   TROPONINT 0.261 (C) 02/13/2017 1311       Microbiology Results (last 10 days)     ** No results found for the last " 240 hours. **           Imaging Results (last 7 days)     Procedure Component Value Units Date/Time    XR Chest 2 View [[id number]] Collected:  02/13/17 1331     Updated:  02/13/17 1335    Narrative:       XR CHEST 2 VW-     Clinical: Chest pain, shortness of breath     FINDINGS: There is atherosclerotic calcification of the aorta. Cardiac  size upper limits of normal. Prominence of the pulmonary vasculature  consistent with mild to moderate congestion. Atypical pneumonia is felt  to be less likely.No pleural effusion demonstrated.  Few small calcified  benign-appearing mediastinal and hilar lymph nodes noted. The remainder  is unremarkable.     This report was finalized on 2/13/2017 1:32 PM by Dr. Blas Wells MD.           2DECHO:    · All left ventricular wall segments contract normally.  · Left ventricular wall thickness is consistent with mild concentric hypertrophy.  · Left ventricular function is normal. Estimated EF = 65%.  · Left ventricular diastolic dysfunction (grade II) consistent with pseudonormalization.  · Left atrial cavity size is moderately dilated.    Assessment:      Principal Problem:    NSTEMI (non-ST elevated myocardial infarction)  Active Problems:    CHF (congestive heart failure)    Type II diabetes mellitus, uncontrolled    Hyperlipidemia    HTN (hypertension)    Dementia    Schizoaffective disorder    CAD (coronary artery disease)    Chronic renal insufficiency, stage III (moderate)      Patient Active Problem List   Diagnosis Code   • NSTEMI (non-ST elevated myocardial infarction) I21.4   • CHF (congestive heart failure) I50.9   • Type II diabetes mellitus, uncontrolled E11.65   • Hyperlipidemia E78.5   • HTN (hypertension) I10   • Dementia F03.90   • Schizoaffective disorder F25.9   • CAD (coronary artery disease) I25.10   • Chronic renal insufficiency, stage III (moderate) N18.3       Plan:    Encourage compliance with medications  F/U cardiology recommendations  Diet: CCHO, regular  consistency  Monitor and correct electrolytes  Monitor mental status- no verbal aggression, but refusal of medications  Continue Accuchecks and SSI  Wound nurse consult  PT/OT/ST  DVT/stress ulcer prophylaxis  Labs in am      Gustavo Mcgrath MD  2/15/2017  10:33 AM        Much of this encounter note is an electronic transcription/translation of spoken language to printed text. The electronic translation of spoken language may permit erroneous, or at times, nonsensical words or phrases to be inadvertently transcribed; Although I have reviewed the note for such errors, some may still exist

## 2017-02-16 ENCOUNTER — APPOINTMENT (OUTPATIENT)
Dept: NUCLEAR MEDICINE | Facility: HOSPITAL | Age: 82
End: 2017-02-16

## 2017-02-16 LAB
ANION GAP SERPL CALCULATED.3IONS-SCNC: 15.8 MMOL/L
BASOPHILS # BLD AUTO: 0.02 10*3/MM3 (ref 0–0.2)
BASOPHILS NFR BLD AUTO: 0.2 % (ref 0–1.5)
BH CV NUCLEAR PRIOR STUDY: 2
BH CV STRESS COMMENTS STAGE 1: NORMAL
BH CV STRESS DOSE REGADENOSON STAGE 1: 0.4
BH CV STRESS DURATION MIN STAGE 1: 0
BH CV STRESS DURATION SEC STAGE 1: 15
BH CV STRESS PROTOCOL 1: NORMAL
BH CV STRESS RECOVERY BP: NORMAL MMHG
BH CV STRESS RECOVERY HR: 68 BPM
BH CV STRESS STAGE 1: 1
BUN BLD-MCNC: 48 MG/DL (ref 8–23)
BUN/CREAT SERPL: 30.6 (ref 7–25)
CALCIUM SPEC-SCNC: 9.2 MG/DL (ref 8.6–10.5)
CHLORIDE SERPL-SCNC: 102 MMOL/L (ref 98–107)
CO2 SERPL-SCNC: 23.2 MMOL/L (ref 22–29)
CREAT BLD-MCNC: 1.57 MG/DL (ref 0.57–1)
DEPRECATED RDW RBC AUTO: 44.1 FL (ref 37–54)
EOSINOPHIL # BLD AUTO: 0.18 10*3/MM3 (ref 0–0.7)
EOSINOPHIL NFR BLD AUTO: 1.7 % (ref 0.3–6.2)
ERYTHROCYTE [DISTWIDTH] IN BLOOD BY AUTOMATED COUNT: 15.1 % (ref 11.7–13)
GFR SERPL CREATININE-BSD FRML MDRD: 32 ML/MIN/1.73
GLUCOSE BLD-MCNC: 261 MG/DL (ref 65–99)
GLUCOSE BLDC GLUCOMTR-MCNC: 185 MG/DL (ref 70–130)
GLUCOSE BLDC GLUCOMTR-MCNC: 207 MG/DL (ref 70–130)
GLUCOSE BLDC GLUCOMTR-MCNC: 245 MG/DL (ref 70–130)
GLUCOSE BLDC GLUCOMTR-MCNC: 262 MG/DL (ref 70–130)
HCT VFR BLD AUTO: 37.6 % (ref 35.6–45.5)
HGB BLD-MCNC: 11.6 G/DL (ref 11.9–15.5)
IMM GRANULOCYTES # BLD: 0.03 10*3/MM3 (ref 0–0.03)
IMM GRANULOCYTES NFR BLD: 0.3 % (ref 0–0.5)
LV EF NUC BP: 56 %
LYMPHOCYTES # BLD AUTO: 2.05 10*3/MM3 (ref 0.9–4.8)
LYMPHOCYTES NFR BLD AUTO: 19.2 % (ref 19.6–45.3)
MAXIMAL PREDICTED HEART RATE: 139 BPM
MCH RBC QN AUTO: 24.9 PG (ref 26.9–32)
MCHC RBC AUTO-ENTMCNC: 30.9 G/DL (ref 32.4–36.3)
MCV RBC AUTO: 80.7 FL (ref 80.5–98.2)
MONOCYTES # BLD AUTO: 0.55 10*3/MM3 (ref 0.2–1.2)
MONOCYTES NFR BLD AUTO: 5.1 % (ref 5–12)
NEUTROPHILS # BLD AUTO: 7.87 10*3/MM3 (ref 1.9–8.1)
NEUTROPHILS NFR BLD AUTO: 73.5 % (ref 42.7–76)
PERCENT MAX PREDICTED HR: 53.96 %
PLATELET # BLD AUTO: 241 10*3/MM3 (ref 140–500)
PMV BLD AUTO: 11.5 FL (ref 6–12)
POTASSIUM BLD-SCNC: 4.4 MMOL/L (ref 3.5–5.2)
RBC # BLD AUTO: 4.66 10*6/MM3 (ref 3.9–5.2)
SODIUM BLD-SCNC: 141 MMOL/L (ref 136–145)
STRESS BASELINE BP: NORMAL MMHG
STRESS BASELINE HR: 58 BPM
STRESS PERCENT HR: 63 %
STRESS POST ESTIMATED WORKLOAD: 1 METS
STRESS POST EXERCISE DUR MIN: 4 MIN
STRESS POST EXERCISE DUR SEC: 0 SEC
STRESS POST PEAK BP: NORMAL MMHG
STRESS POST PEAK HR: 75 BPM
STRESS TARGET HR: 118 BPM
WBC NRBC COR # BLD: 10.7 10*3/MM3 (ref 4.5–10.7)

## 2017-02-16 PROCEDURE — 94640 AIRWAY INHALATION TREATMENT: CPT

## 2017-02-16 PROCEDURE — 0 TECHNETIUM SESTAMIBI: Performed by: INTERNAL MEDICINE

## 2017-02-16 PROCEDURE — 78452 HT MUSCLE IMAGE SPECT MULT: CPT

## 2017-02-16 PROCEDURE — 25010000002 REGADENOSON 0.4 MG/5ML SOLUTION: Performed by: INTERNAL MEDICINE

## 2017-02-16 PROCEDURE — 93016 CV STRESS TEST SUPVJ ONLY: CPT | Performed by: INTERNAL MEDICINE

## 2017-02-16 PROCEDURE — A9500 TC99M SESTAMIBI: HCPCS | Performed by: INTERNAL MEDICINE

## 2017-02-16 PROCEDURE — 63710000001 INSULIN ASPART PER 5 UNITS: Performed by: INTERNAL MEDICINE

## 2017-02-16 PROCEDURE — 85025 COMPLETE CBC W/AUTO DIFF WBC: CPT | Performed by: NURSE PRACTITIONER

## 2017-02-16 PROCEDURE — 80048 BASIC METABOLIC PNL TOTAL CA: CPT | Performed by: NURSE PRACTITIONER

## 2017-02-16 PROCEDURE — 82962 GLUCOSE BLOOD TEST: CPT

## 2017-02-16 PROCEDURE — 99233 SBSQ HOSP IP/OBS HIGH 50: CPT | Performed by: INTERNAL MEDICINE

## 2017-02-16 PROCEDURE — 78452 HT MUSCLE IMAGE SPECT MULT: CPT | Performed by: INTERNAL MEDICINE

## 2017-02-16 PROCEDURE — 93017 CV STRESS TEST TRACING ONLY: CPT

## 2017-02-16 PROCEDURE — 93018 CV STRESS TEST I&R ONLY: CPT | Performed by: INTERNAL MEDICINE

## 2017-02-16 RX ORDER — LANOLIN ALCOHOL/MO/W.PET/CERES
CREAM (GRAM) TOPICAL 2 TIMES DAILY
Status: DISCONTINUED | OUTPATIENT
Start: 2017-02-16 | End: 2017-02-17 | Stop reason: HOSPADM

## 2017-02-16 RX ORDER — HYDRALAZINE HYDROCHLORIDE 50 MG/1
100 TABLET, FILM COATED ORAL EVERY 12 HOURS SCHEDULED
Status: DISCONTINUED | OUTPATIENT
Start: 2017-02-16 | End: 2017-02-17 | Stop reason: HOSPADM

## 2017-02-16 RX ORDER — ISOSORBIDE MONONITRATE 30 MG/1
30 TABLET, EXTENDED RELEASE ORAL EVERY 12 HOURS
Status: DISCONTINUED | OUTPATIENT
Start: 2017-02-16 | End: 2017-02-17 | Stop reason: HOSPADM

## 2017-02-16 RX ADMIN — LISINOPRIL 40 MG: 40 TABLET ORAL at 09:44

## 2017-02-16 RX ADMIN — Medication 1 DOSE: at 06:40

## 2017-02-16 RX ADMIN — FUROSEMIDE 40 MG: 40 TABLET ORAL at 09:44

## 2017-02-16 RX ADMIN — Medication 1 DOSE: at 08:20

## 2017-02-16 RX ADMIN — INSULIN ASPART 3 UNITS: 100 INJECTION, SOLUTION INTRAVENOUS; SUBCUTANEOUS at 09:45

## 2017-02-16 RX ADMIN — FUROSEMIDE 40 MG: 40 TABLET ORAL at 17:26

## 2017-02-16 RX ADMIN — REGADENOSON 0.4 MG: 0.08 INJECTION, SOLUTION INTRAVENOUS at 08:20

## 2017-02-16 RX ADMIN — METOPROLOL TARTRATE 50 MG: 50 TABLET ORAL at 21:18

## 2017-02-16 RX ADMIN — INSULIN ASPART 4 UNITS: 100 INJECTION, SOLUTION INTRAVENOUS; SUBCUTANEOUS at 12:05

## 2017-02-16 RX ADMIN — HYDRALAZINE HYDROCHLORIDE 50 MG: 25 TABLET ORAL at 12:05

## 2017-02-16 RX ADMIN — HYDRALAZINE HYDROCHLORIDE 100 MG: 50 TABLET, FILM COATED ORAL at 21:18

## 2017-02-16 RX ADMIN — INSULIN ASPART 3 UNITS: 100 INJECTION, SOLUTION INTRAVENOUS; SUBCUTANEOUS at 17:28

## 2017-02-16 RX ADMIN — HYDRALAZINE HYDROCHLORIDE 50 MG: 25 TABLET ORAL at 09:44

## 2017-02-16 RX ADMIN — POTASSIUM CHLORIDE 20 MEQ: 750 CAPSULE, EXTENDED RELEASE ORAL at 09:44

## 2017-02-16 RX ADMIN — RISPERIDONE 1 MG: 1 TABLET, ORALLY DISINTEGRATING ORAL at 21:18

## 2017-02-16 RX ADMIN — IPRATROPIUM BROMIDE AND ALBUTEROL SULFATE 3 ML: .5; 3 SOLUTION RESPIRATORY (INHALATION) at 08:00

## 2017-02-16 RX ADMIN — ASPIRIN 81 MG: 81 TABLET ORAL at 09:44

## 2017-02-16 RX ADMIN — ISOSORBIDE MONONITRATE 30 MG: 30 TABLET, EXTENDED RELEASE ORAL at 21:18

## 2017-02-16 RX ADMIN — METOPROLOL TARTRATE 50 MG: 50 TABLET ORAL at 09:44

## 2017-02-16 RX ADMIN — ATORVASTATIN CALCIUM 40 MG: 40 TABLET, FILM COATED ORAL at 09:44

## 2017-02-16 RX ADMIN — INSULIN ASPART 10 UNITS: 100 INJECTION, SOLUTION INTRAVENOUS; SUBCUTANEOUS at 17:26

## 2017-02-16 RX ADMIN — ISOSORBIDE MONONITRATE 30 MG: 30 TABLET, EXTENDED RELEASE ORAL at 09:44

## 2017-02-16 RX ADMIN — INSULIN ASPART 8 UNITS: 100 INJECTION, SOLUTION INTRAVENOUS; SUBCUTANEOUS at 12:05

## 2017-02-16 RX ADMIN — TRAMADOL HYDROCHLORIDE 50 MG: 50 TABLET, COATED ORAL at 13:52

## 2017-02-16 RX ADMIN — INSULIN ASPART 10 UNITS: 100 INJECTION, SOLUTION INTRAVENOUS; SUBCUTANEOUS at 09:45

## 2017-02-16 NOTE — PROGRESS NOTES
"   LOS: 3 days   Patient Care Team:  Gustavo Mcgrath MD as PCP - General (Internal Medicine)    Chief Complaint: NSTEMI       Interval History:  No chest pain.  Complains of \"sores\" on her heels and her bottom.  Will not let the nursing staff clean her.  Very paranoid.      Objective   Vital Signs  Temp:  [98 °F (36.7 °C)-98.9 °F (37.2 °C)] 98.9 °F (37.2 °C)  Heart Rate:  [59-76] 76  Resp:  [16-18] 16  BP: (124-143)/(51-67) 143/67    Intake/Output Summary (Last 24 hours) at 02/16/17 1306  Last data filed at 02/16/17 0510   Gross per 24 hour   Intake    300 ml   Output      0 ml   Net    300 ml           Physical Exam   Constitutional:   obese   HENT:   Head: Normocephalic.   Nose: Nose normal.   Mouth/Throat: Oropharynx is clear and moist.   Eyes: Conjunctivae and EOM are normal. Pupils are equal, round, and reactive to light.   Neck: Normal range of motion.   Cannot assess JVD due to body habitus   Cardiovascular: Normal rate, regular rhythm, normal heart sounds and intact distal pulses.    Pulmonary/Chest: Effort normal and breath sounds normal.   Abdominal: Soft.   Cannot feel organs or aorta   Musculoskeletal: Normal range of motion. She exhibits no edema (terrible venous stasis).   Neurological: She is alert. No cranial nerve deficit.   Skin: Skin is warm and dry. No erythema.   Psychiatric: Thought content is paranoid. Cognition and memory are impaired.   Vitals reviewed.      Results Review:        Results from last 7 days  Lab Units 02/16/17  0623 02/15/17  0527 02/14/17  0415   SODIUM mmol/L 141 140 139   POTASSIUM mmol/L 4.4 4.4 4.5   CHLORIDE mmol/L 102 103 100   TOTAL CO2 mmol/L 23.2 24.5 24.9   BUN mg/dL 48* 50* 46*   CREATININE mg/dL 1.57* 1.51* 1.45*   GLUCOSE mg/dL 261* 186* 160*   CALCIUM mg/dL 9.2 8.5* 9.2       Results from last 7 days  Lab Units 02/15/17  0527 02/14/17 0415 02/13/17  1311   TROPONIN T ng/mL 0.264* 0.424* 0.261*       Results from last 7 days  Lab Units 02/16/17  0623 " 02/15/17  0527 02/14/17  0415   WBC 10*3/mm3 10.70 7.47 8.95   HEMOGLOBIN g/dL 11.6* 10.0* 10.8*   HEMATOCRIT % 37.6 32.0* 34.0*   PLATELETS 10*3/mm3 241 207 226                       I reviewed the patient's new clinical results.  I personally viewed and interpreted the patient's EKG/Telemetry data        Medication Review:     aspirin 81 mg Oral Daily   atorvastatin 40 mg Oral Daily   CloNIDine 1 patch Transdermal Weekly   enoxaparin 1 mg/kg Subcutaneous Q12H   furosemide 40 mg Oral BID   hydrALAZINE 50 mg Oral 4x Daily   insulin aspart 0-7 Units Subcutaneous 4x Daily AC & at Bedtime   insulin aspart 10 Units Subcutaneous QAM AC   insulin aspart 10 Units Subcutaneous Daily Before Supper   insulin aspart 8 Units Subcutaneous Daily Before Lunch   insulin detemir 10 Units Subcutaneous Nightly   ipratropium-albuterol 3 mL Nebulization 4x Daily - RT   isosorbide mononitrate 30 mg Oral Q24H   lisinopril 40 mg Oral Daily   metoprolol tartrate 50 mg Oral Q12H   potassium chloride 20 mEq Oral Daily   risperiDONE 1 mg Oral Nightly         Pharmacy to Dose enoxaparin (LOVENOX)        Assessment/Plan     1.   NSTEMI (non-ST elevated myocardial infarction) without prior history of CAD -- baseline echo is normal.  Chest pain has resolved.  I reviewed her stress and I disagree slightly with the read; I don't feel that the defect is that large.  It's classic left circumflex territory.  I strongly recommend medical therapy as she's responded nicely to it, has normal LV systolic function and wall motion, and due to her CKD and dementia/psychiatric issues.  Continue aspirin, atorvastatin, metoprolol, and isosorbide (increase dose).  2.   HTN (hypertension) -- improving.  Increasing isosorbide.  Continue lisinopril, clonidine, and hydralazine as well.    Okay for discharge from CV standpoint.      Leandro Zavala MD  02/16/17  1:06 PM

## 2017-02-16 NOTE — NURSING NOTE
WOCN consult; Shear / Friction right buttock. Legs dry scaly . Recommend silicone border dressing buttock and Eucerin cream to legs

## 2017-02-16 NOTE — PROGRESS NOTES
DAILY PROGRESS NOTE  [facility], [facility]      Patient Identification:  Name: Britney Deras  Age: 81 y.o.  Sex: female  :  1935  MRN: [id number]         Primary Care Physician: Gustavo Mcgrath MD    Subjective:  Interval History:    Ms. Deras is awake and alert, enroute to Stress test this morning. She denies CP, SOA, N/V/D. Cardiology continuing w/u for NSTEMI and hopes to be able to do medical management. Of note, her Creatinine has been slowly increasing daily.     Objective:    Scheduled Meds:  aspirin 81 mg Oral Daily   atorvastatin 40 mg Oral Daily   CloNIDine 1 patch Transdermal Weekly   enoxaparin 1 mg/kg Subcutaneous Q12H   furosemide 40 mg Oral BID   hydrALAZINE 50 mg Oral 4x Daily   insulin aspart 0-7 Units Subcutaneous 4x Daily AC & at Bedtime   insulin aspart 10 Units Subcutaneous QAM AC   insulin aspart 10 Units Subcutaneous Daily Before Supper   insulin aspart 8 Units Subcutaneous Daily Before Lunch   insulin detemir 10 Units Subcutaneous Nightly   ipratropium-albuterol 3 mL Nebulization 4x Daily - RT   isosorbide mononitrate 30 mg Oral Q24H   lisinopril 40 mg Oral Daily   metoprolol tartrate 50 mg Oral Q12H   potassium chloride 20 mEq Oral Daily   risperiDONE 1 mg Oral Nightly     Continuous Infusions:  Pharmacy to Dose enoxaparin (LOVENOX)        Vital signs in last 24 hours:  Temp:  [98 °F (36.7 °C)-98.9 °F (37.2 °C)] 98.9 °F (37.2 °C)  Heart Rate:  [59-76] 76  Resp:  [16-18] 16  BP: (121-143)/(51-67) 143/67    tMax 24 hrs: Temp (24hrs), Av.6 °F (37 °C), Min:98 °F (36.7 °C), Max:98.9 °F (37.2 °C)      Intake/Output:    Intake/Output Summary (Last 24 hours) at 17 1018  Last data filed at 17 0510   Gross per 24 hour   Intake    300 ml   Output      0 ml   Net    300 ml       Exam:    Visit Vitals   • /67 (BP Location: Right arm, Patient Position: Lying)   • Pulse 76   • Temp 98.9 °F (37.2 °C) (Oral)   • Resp 16   • Ht  "64.5\" (163.8 cm)   • Wt 218 lb (98.9 kg)   • SpO2 95%   • BMI 36.84 kg/m2       General: Alert, oriented x 2. Cooperative at present, no distress, appears stated age  Neck: Supple, symmetrical, trachea midline, no adenopathy;   thyroid: no enlargement/tenderness/nodules;   no carotid bruit or JVD  Cardiovascular: Normal rate, regular rhythm and intact distal pulses.  Exam reveals no gallop and no friction rub. No murmur heard  Pulmonary: Diminished BS. No rhonchi, rales. Rare expiratory wheeze.   Abdominal: Soft. Non-tender, bowel sounds active all four quadrants,   no masses, no hepatomegaly, no splenomegaly.   Extremities: No cyanosis. Edema of LE ++, diminished peripheral pulses  Pulses: 1 + symmetric all extremities  Neurological: She is alert and oriented to person, place.   CNII-XII intact, normal strength, sensation intact throughout  Skin: Skin color, texture, turgor normal, no rashes or lesions      Data Review:      Results from last 7 days  Lab Units 02/16/17  0623 02/15/17  0527 02/14/17 0415   WBC 10*3/mm3 10.70 7.47 8.95   HEMOGLOBIN g/dL 11.6* 10.0* 10.8*   HEMATOCRIT % 37.6 32.0* 34.0*   PLATELETS 10*3/mm3 241 207 226         Results from last 7 days  Lab Units 02/16/17  0623 02/15/17  0527 02/14/17 0415 02/13/17  1311   SODIUM mmol/L 141 140 139 141   POTASSIUM mmol/L 4.4 4.4 4.5 4.3   CHLORIDE mmol/L 102 103 100 99   TOTAL CO2 mmol/L 23.2 24.5 24.9 26.3   BUN mg/dL 48* 50* 46* 45*   CREATININE mg/dL 1.57* 1.51* 1.45* 1.39*   CALCIUM mg/dL 9.2 8.5* 9.2 9.7   BILIRUBIN mg/dL  --   --   --  <0.2   ALK PHOS U/L  --   --   --  118*   ALT (SGPT) U/L  --   --   --  21   AST (SGOT) U/L  --   --   --  19   GLUCOSE mg/dL 261* 186* 160* 301*             0  Lab Value Date/Time   TROPONINT 0.264 (C) 02/15/2017 0527   TROPONINT 0.424 (C) 02/14/2017 0415   TROPONINT 0.261 (C) 02/13/2017 1311       Microbiology Results (last 10 days)     ** No results found for the last 240 hours. **           Imaging Results " (last 7 days)     Procedure Component Value Units Date/Time    XR Chest 2 View [[id number]] Collected:  02/13/17 1331     Updated:  02/13/17 1335    Narrative:       XR CHEST 2 VW-     Clinical: Chest pain, shortness of breath     FINDINGS: There is atherosclerotic calcification of the aorta. Cardiac  size upper limits of normal. Prominence of the pulmonary vasculature  consistent with mild to moderate congestion. Atypical pneumonia is felt  to be less likely.No pleural effusion demonstrated.  Few small calcified  benign-appearing mediastinal and hilar lymph nodes noted. The remainder  is unremarkable.     This report was finalized on 2/13/2017 1:32 PM by Dr. Blas Wells MD.               Assessment:      Principal Problem:    NSTEMI (non-ST elevated myocardial infarction)  Active Problems:    CHF (congestive heart failure)    Type II diabetes mellitus, uncontrolled    Hyperlipidemia    HTN (hypertension)    Dementia    Schizoaffective disorder    CAD (coronary artery disease)    Chronic renal insufficiency, stage III (moderate)      Patient Active Problem List   Diagnosis Code   • NSTEMI (non-ST elevated myocardial infarction) I21.4   • CHF (congestive heart failure) I50.9   • Type II diabetes mellitus, uncontrolled E11.65   • Hyperlipidemia E78.5   • HTN (hypertension) I10   • Dementia F03.90   • Schizoaffective disorder F25.9   • CAD (coronary artery disease) I25.10   • Chronic renal insufficiency, stage III (moderate) N18.3       Plan:    Encourage compliance with medications  Diet: CCHO, reg consistency  Stress test is positive with large ischemic area. Will wait for cardiology recommendations. ?cath.  Monitor and correct electrolytes  Monitor mental status  Continue Accuchecks and SSI  PT  DVT/stress ulcer prophylaxis  Labs in       Gustavo Mcgrath MD  2/16/2017        Much of this encounter note is an electronic transcription/translation of spoken language to printed text. The electronic translation of  spoken language may permit erroneous, or at times, nonsensical words or phrases to be inadvertently transcribed; Although I have reviewed the note for such errors, some may still exist

## 2017-02-16 NOTE — SIGNIFICANT NOTE
02/16/17 1527   Rehab Treatment   Discipline physical therapist   Rehab Evaluation   Evaluation Not Performed patient/family declined evaluation  (Pt requests that we follow up tomorrow as she just got back in bed and has a headache. Discussed with RNMarguerite)   Recommendation   PT - Next Appointment 02/17/17

## 2017-02-17 VITALS
HEIGHT: 65 IN | DIASTOLIC BLOOD PRESSURE: 59 MMHG | TEMPERATURE: 97.8 F | BODY MASS INDEX: 36.54 KG/M2 | OXYGEN SATURATION: 98 % | HEART RATE: 65 BPM | RESPIRATION RATE: 20 BRPM | SYSTOLIC BLOOD PRESSURE: 129 MMHG | WEIGHT: 219.3 LBS

## 2017-02-17 LAB
ANION GAP SERPL CALCULATED.3IONS-SCNC: 9.6 MMOL/L
BASOPHILS # BLD AUTO: 0.02 10*3/MM3 (ref 0–0.2)
BASOPHILS NFR BLD AUTO: 0.3 % (ref 0–1.5)
BUN BLD-MCNC: 54 MG/DL (ref 8–23)
BUN/CREAT SERPL: 30.9 (ref 7–25)
CALCIUM SPEC-SCNC: 8.6 MG/DL (ref 8.6–10.5)
CHLORIDE SERPL-SCNC: 103 MMOL/L (ref 98–107)
CO2 SERPL-SCNC: 26.4 MMOL/L (ref 22–29)
CREAT BLD-MCNC: 1.75 MG/DL (ref 0.57–1)
DEPRECATED RDW RBC AUTO: 44.2 FL (ref 37–54)
EOSINOPHIL # BLD AUTO: 0.22 10*3/MM3 (ref 0–0.7)
EOSINOPHIL NFR BLD AUTO: 2.9 % (ref 0.3–6.2)
ERYTHROCYTE [DISTWIDTH] IN BLOOD BY AUTOMATED COUNT: 15.1 % (ref 11.7–13)
GFR SERPL CREATININE-BSD FRML MDRD: 28 ML/MIN/1.73
GLUCOSE BLD-MCNC: 233 MG/DL (ref 65–99)
GLUCOSE BLDC GLUCOMTR-MCNC: 215 MG/DL (ref 70–130)
GLUCOSE BLDC GLUCOMTR-MCNC: 261 MG/DL (ref 70–130)
HCT VFR BLD AUTO: 32.2 % (ref 35.6–45.5)
HGB BLD-MCNC: 10.2 G/DL (ref 11.9–15.5)
IMM GRANULOCYTES # BLD: 0 10*3/MM3 (ref 0–0.03)
IMM GRANULOCYTES NFR BLD: 0 % (ref 0–0.5)
LYMPHOCYTES # BLD AUTO: 2.23 10*3/MM3 (ref 0.9–4.8)
LYMPHOCYTES NFR BLD AUTO: 29.3 % (ref 19.6–45.3)
MCH RBC QN AUTO: 25.7 PG (ref 26.9–32)
MCHC RBC AUTO-ENTMCNC: 31.7 G/DL (ref 32.4–36.3)
MCV RBC AUTO: 81.1 FL (ref 80.5–98.2)
MONOCYTES # BLD AUTO: 0.69 10*3/MM3 (ref 0.2–1.2)
MONOCYTES NFR BLD AUTO: 9.1 % (ref 5–12)
NEUTROPHILS # BLD AUTO: 4.46 10*3/MM3 (ref 1.9–8.1)
NEUTROPHILS NFR BLD AUTO: 58.4 % (ref 42.7–76)
PLATELET # BLD AUTO: 201 10*3/MM3 (ref 140–500)
PMV BLD AUTO: 11.7 FL (ref 6–12)
POTASSIUM BLD-SCNC: 4.6 MMOL/L (ref 3.5–5.2)
RBC # BLD AUTO: 3.97 10*6/MM3 (ref 3.9–5.2)
SODIUM BLD-SCNC: 139 MMOL/L (ref 136–145)
WBC NRBC COR # BLD: 7.62 10*3/MM3 (ref 4.5–10.7)

## 2017-02-17 PROCEDURE — 85025 COMPLETE CBC W/AUTO DIFF WBC: CPT | Performed by: NURSE PRACTITIONER

## 2017-02-17 PROCEDURE — 82962 GLUCOSE BLOOD TEST: CPT

## 2017-02-17 PROCEDURE — 63710000001 INSULIN ASPART PER 5 UNITS: Performed by: INTERNAL MEDICINE

## 2017-02-17 PROCEDURE — 80048 BASIC METABOLIC PNL TOTAL CA: CPT | Performed by: NURSE PRACTITIONER

## 2017-02-17 RX ORDER — RISPERIDONE 1 MG/1
1 TABLET, ORALLY DISINTEGRATING ORAL NIGHTLY
Qty: 30 TABLET | Refills: 0 | Status: SHIPPED | OUTPATIENT
Start: 2017-02-17

## 2017-02-17 RX ORDER — ATORVASTATIN CALCIUM 40 MG/1
40 TABLET, FILM COATED ORAL DAILY
Qty: 30 TABLET | Refills: 0 | Status: SHIPPED | OUTPATIENT
Start: 2017-02-17

## 2017-02-17 RX ORDER — METOPROLOL TARTRATE 50 MG/1
50 TABLET, FILM COATED ORAL EVERY 12 HOURS SCHEDULED
Qty: 60 TABLET | Refills: 0 | Status: SHIPPED | OUTPATIENT
Start: 2017-02-17 | End: 2018-01-09 | Stop reason: ALTCHOICE

## 2017-02-17 RX ORDER — ISOSORBIDE MONONITRATE 30 MG/1
30 TABLET, EXTENDED RELEASE ORAL EVERY 12 HOURS
Qty: 60 TABLET | Refills: 0 | Status: SHIPPED | OUTPATIENT
Start: 2017-02-17

## 2017-02-17 RX ORDER — TRAMADOL HYDROCHLORIDE 50 MG/1
50 TABLET ORAL EVERY 12 HOURS PRN
Status: DISCONTINUED | OUTPATIENT
Start: 2017-02-17 | End: 2017-02-17 | Stop reason: HOSPADM

## 2017-02-17 RX ORDER — HYDRALAZINE HYDROCHLORIDE 100 MG/1
100 TABLET, FILM COATED ORAL EVERY 12 HOURS SCHEDULED
Qty: 60 TABLET | Refills: 0 | Status: SHIPPED | OUTPATIENT
Start: 2017-02-17 | End: 2017-03-27 | Stop reason: HOSPADM

## 2017-02-17 RX ORDER — TRAMADOL HYDROCHLORIDE 50 MG/1
50 TABLET ORAL EVERY 6 HOURS PRN
Qty: 120 TABLET | Refills: 0 | Status: SHIPPED | OUTPATIENT
Start: 2017-02-17 | End: 2017-03-27 | Stop reason: HOSPADM

## 2017-02-17 RX ADMIN — FUROSEMIDE 40 MG: 40 TABLET ORAL at 08:46

## 2017-02-17 RX ADMIN — INSULIN ASPART 10 UNITS: 100 INJECTION, SOLUTION INTRAVENOUS; SUBCUTANEOUS at 08:47

## 2017-02-17 RX ADMIN — INSULIN ASPART 4 UNITS: 100 INJECTION, SOLUTION INTRAVENOUS; SUBCUTANEOUS at 12:14

## 2017-02-17 RX ADMIN — ASPIRIN 81 MG: 81 TABLET ORAL at 08:46

## 2017-02-17 RX ADMIN — INSULIN ASPART 3 UNITS: 100 INJECTION, SOLUTION INTRAVENOUS; SUBCUTANEOUS at 08:47

## 2017-02-17 RX ADMIN — INSULIN ASPART 8 UNITS: 100 INJECTION, SOLUTION INTRAVENOUS; SUBCUTANEOUS at 12:14

## 2017-02-17 RX ADMIN — ISOSORBIDE MONONITRATE 30 MG: 30 TABLET, EXTENDED RELEASE ORAL at 08:46

## 2017-02-17 RX ADMIN — Medication: at 08:47

## 2017-02-17 RX ADMIN — LISINOPRIL 40 MG: 40 TABLET ORAL at 08:46

## 2017-02-17 RX ADMIN — METOPROLOL TARTRATE 50 MG: 50 TABLET ORAL at 08:46

## 2017-02-17 RX ADMIN — HYDRALAZINE HYDROCHLORIDE 50 MG: 50 TABLET, FILM COATED ORAL at 08:46

## 2017-02-17 RX ADMIN — ACETAMINOPHEN 650 MG: 325 TABLET ORAL at 08:58

## 2017-02-17 RX ADMIN — ATORVASTATIN CALCIUM 40 MG: 40 TABLET, FILM COATED ORAL at 08:46

## 2017-02-17 RX ADMIN — POTASSIUM CHLORIDE 20 MEQ: 750 CAPSULE, EXTENDED RELEASE ORAL at 08:46

## 2017-02-17 NOTE — PROGRESS NOTES
[facility]    Physicians Statement of Medical Necessity for Ambulance Transportation    It is medically necessary for:    Patient Name: Britney Deras    Insurance Information:  See facesheet    To be transported by ambulance:    From (if nursing facility, specify level of care: skilled, half-way, etc): [facility]    To (specify level of care if nursing facility): [facility]    Date of Service: 2-17-17    For dialysis patients state date dialysis began: n/a    Diagnosis: NSTEMI    Past Medical/Surgical History:  Past Medical History   Diagnosis Date   • Angina pectoris    • Asthma    • Atherosclerosis    • CHF (congestive heart failure)    • Coronary artery disease    • Dementia    • Depression    • Diabetes mellitus    • GERD (gastroesophageal reflux disease)    • Hyperlipidemia    • Hypertension    • Stroke    • TIA (transient ischemic attack)       History reviewed. No pertinent past surgical history.     Current Objective Medical Evidence(including physical exam finding to support reason for limitations):    Confused/combative: may require restraints    Other: Schizoaffective disorder, paranoia, weakness, falls risk    Physician Signature:           (RN,NP,PA,CAN, Discharge Planner) Date/Time: 2-17-17     Printed Name:    ____________Gera GIBSON/CCP______________________    [facility]   Phone: [phone] Phone: [phone] Phone: [phone]   Fax: [phone] Fax: [phone] Fax: [phone]

## 2017-02-17 NOTE — PLAN OF CARE
Problem: Patient Care Overview (Adult)  Goal: Plan of Care Review  Outcome: Ongoing (interventions implemented as appropriate)    02/17/17 0336   Coping/Psychosocial Response Interventions   Plan Of Care Reviewed With patient   Patient Care Overview   Progress no change   Outcome Evaluation   Outcome Summary/Follow up Plan Pt denies chest pain.Pt refused 2 units of Novolg and her Levemir last hs despite health teachings done.Will continue to monitor.       Goal: Adult Individualization and Mutuality  Outcome: Ongoing (interventions implemented as appropriate)    Problem: Fall Risk (Adult)  Goal: Absence of Falls  Outcome: Ongoing (interventions implemented as appropriate)    Problem: Diabetes, Type 2 (Adult)  Goal: Signs and Symptoms of Listed Potential Problems Will be Absent or Manageable (Diabetes, Type 2)  Outcome: Ongoing (interventions implemented as appropriate)

## 2017-02-17 NOTE — DISCHARGE SUMMARY
PHYSICIAN DISCHARGE SUMMARY  [facility], [facility]    Patient Identification:  Name: Britney Deras  Age: 81 y.o.  Sex: female  :  1935  MRN: [id number]    Primary Care Physician: Gustavo Mcgrath MD    Admit date: 2017  Discharge date and time:2017    Discharged Condition: stable    Discharge Diagnoses:  Principal Problem:    NSTEMI (non-ST elevated myocardial infarction)  Active Problems:    Type II diabetes mellitus, uncontrolled    Hyperlipidemia    HTN (hypertension)    Dementia    Schizoaffective disorder    Chronic renal insufficiency, stage III (moderate)    Patient Active Problem List   Diagnosis Code   • NSTEMI (non-ST elevated myocardial infarction) I21.4   • Type II diabetes mellitus, uncontrolled E11.65   • Hyperlipidemia E78.5   • HTN (hypertension) I10   • Dementia F03.90   • Schizoaffective disorder F25.9   • Chronic renal insufficiency, stage III (moderate) N18.3          Hospital Course: Britney Deras  is a 82 y/o female, resident at NH. She has h/o HTN, DM, CHF,TIA, hyperlipidemia, dementia and schizoaffective disorder.She developed chest pain associated with SOB and dizziness. She was sent to ER, she was found to have a troponin of 0.261, mild to moderate congestion in CHF, patient was diagnosed of NSTEMI and was started on nitropaste, ASA and admitted to the hospital for further management. Cardiology was consulted and following. W/U revealed ischemia at the [facility], but they felt she was better served with medical management. She has been CP- free since admission and she is ready for discharge back to Nursing home.     PMHX:   Past Medical History   Diagnosis Date   • Angina pectoris    • Asthma    • Atherosclerosis    • CHF (congestive heart failure)    • Coronary artery disease    • Dementia    • Depression    • Diabetes mellitus    • GERD (gastroesophageal reflux disease)    • Hyperlipidemia    • Hypertension    • Stroke    •  "TIA (transient ischemic attack)      PSHX: History reviewed. No pertinent past surgical history.        Consults:     Consults     Date and Time Order Name Status Description    2/13/2017 1702 LCG (on-call MD unless specified)      2/13/2017 1408 IP General Consult (Use specialty-specific consult if known) Completed           Discharge Exam:    Visit Vitals   • /59 (BP Location: Right arm, Patient Position: Sitting)   • Pulse 65   • Temp 97.8 °F (36.6 °C) (Oral)   • Resp 20   • Ht 64.5\" (163.8 cm)   • Wt 219 lb 4.8 oz (99.5 kg)   • SpO2 98%   • BMI 37.06 kg/m2       General: Alert, cooperative; appears stated age. In no acute distress    Head: Normocephalic, without obvious abnormality, atraumatic  Neck: Supple, symmetrical;  trachea midline without adenopathy;              Thyroid with no enlargement/tenderness/nodules;              no carotid bruit or JVD  Cardiovascular: Normal rate, regular rhythm and intact distal pulses.                              Exam reveals no gallop and no friction rub. No murmur heard  Pulmonary: Clear to auscultation bilaterally, respirations unlabored.                        No rhonchi, wheezing or rales.   Abdominal: Soft,  non-tender, bowel sounds active all four quadrants;                          no masses,  hepatomegaly, or  splenomegaly.   Extremities: Normal, atraumatic, no cyanosis. ++ edema at lower extremity, chronic  Pulses:     2 + symmetric all extremities  Neurological: He is alert and oriented to person, place, and time.                          CNII-XII intact, normal strength, sensation intact throughout        Data Review:        Results from last 7 days  Lab Units 02/17/17  0627 02/16/17  0623 02/15/17  0527   WBC 10*3/mm3 7.62 10.70 7.47   HEMOGLOBIN g/dL 10.2* 11.6* 10.0*   HEMATOCRIT % 32.2* 37.6 32.0*   PLATELETS 10*3/mm3 201 241 207         Results from last 7 days  Lab Units 02/17/17 0627 02/16/17 0623 02/15/17  0527  02/13/17  1311   SODIUM mmol/L 139 " 141 140  < > 141   POTASSIUM mmol/L 4.6 4.4 4.4  < > 4.3   CHLORIDE mmol/L 103 102 103  < > 99   TOTAL CO2 mmol/L 26.4 23.2 24.5  < > 26.3   BUN mg/dL 54* 48* 50*  < > 45*   CREATININE mg/dL 1.75* 1.57* 1.51*  < > 1.39*   CALCIUM mg/dL 8.6 9.2 8.5*  < > 9.7   BILIRUBIN mg/dL  --   --   --   --  <0.2   ALK PHOS U/L  --   --   --   --  118*   ALT (SGPT) U/L  --   --   --   --  21   AST (SGOT) U/L  --   --   --   --  19   GLUCOSE mg/dL 233* 261* 186*  < > 301*   < > = values in this interval not displayed.          0  Lab Value Date/Time   TROPONINT 0.264 (C) 02/15/2017 0527   TROPONINT 0.424 (C) 2017 0415   TROPONINT 0.261 (C) 2017 1311       Microbiology Results (last 10 days)     ** No results found for the last 240 hours. **           Imaging Results (all)     Procedure Component Value Units Date/Time    XR Chest 2 View [[id number]] Collected:  17 1331     Updated:  17 133    Narrative:       XR CHEST 2 VW-     Clinical: Chest pain, shortness of breath     FINDINGS: There is atherosclerotic calcification of the aorta. Cardiac  size upper limits of normal. Prominence of the pulmonary vasculature  consistent with mild to moderate congestion. Atypical pneumonia is felt  to be less likely.No pleural effusion demonstrated.  Few small calcified  benign-appearing mediastinal and hilar lymph nodes noted. The remainder  is unremarkable.     This report was finalized on 2017 1:32 PM by Dr. Blas Wells MD.           Britney Deras   Regadenoson Stress Test With Myocardial Perfusion SPECT (Multi Study)   Order# [id number]    Reading physician: Maren Moreno MD   Ordering physician: Aaron Corcoran MD   Study date: 17         Patient Information      Patient Name MRN Sex  (Age)     Britney Deras [id number] Female 1935 (81 y.o.)       Interpretation Summary      · Left ventricular ejection fraction is normal (Calculated EF = 56%).  · Myocardial perfusion imaging indicates a  "large-sized, moderately severe area of ischemia located in the apex, inferior wall and lateral wall.           Patient Height & Weight      Height Weight BSA (Calculated - sq m) BMI (kg/m2) Pulse     64.5\" (163.8 cm) 219 lb 4.8 oz (99.5 kg) 2.13 sq meters 40.64 65        Stress Data      Stage Minutes Seconds Dose (mg) Comments     1        0        15        0.4        15 sec bolus injection                     Stress Measurements      Baseline Vitals   Baseline HR 58 bpm      Baseline /73 mmHg       Peak Stress Vitals   Peak HR 75 bpm      Peak /73 mmHg       Recovery Vitals   Recovery HR 68 bpm      Recovery /51 mmHg       Exercise Data   Target HR (85%) 118 bpm      Max. Pred. HR (100%) 139 bpm      Percent Max Pred HR 53.96 %      Exercise duration (min) 4 min      Exercise duration (sec) 0 sec      Estimated workload 1 METS             Study Description      Nuclear Study Description  A 1-day rest/stress protocol myocardial perfusion imaging study was performed. While at rest, the patient was injected intravenously with 11.2 mCi of technetium sestamibi . 0.4 mg / 5 mL of regadenoson given intravenously over approximately 10 seconds. While at peak stress, the patient was injected intravenously with 35.6 mCi of technetium sestamibi . The total amount of radiation received in the study is about 14.14 mSv.No prior studies were available for comparison     Nuclear Perfusion Images  Overall image quality is good. There are no artifacts present.       Imaging Contrast/Medications:      technetium sestamibi (CARDIOLITE) injection 1 dose        Given: 1 dose Intravenous     technetium sestamibi (CARDIOLITE) injection 1 dose        Given: 1 dose Intravenous     regadenoson (LEXISCAN) injection 0.4 mg        Given: .4 mg Intravenous       Stress Procedure      Rest ECG  Baseline ECG of normal sinus rhythm noted. With sinus arrhythmia. occ pac.     Stress ECG  Stress ECG of normal sinus rhythm noted. "   There was no ST segment deviation noted during stress.   Arrhythmias during stress: rare PACs.   Arrhythmias during recovery: rare PACs.   Arrhythmias were not significant.   ECG was interpretable and indicates a normal stress ECG.   Normal ECG stress ECG interpretation.     Stress Description  A pharmacological stress test was performed using regadenoson without low-level exercise.   The patient reached the end of the protocol.   The patient reported no symptoms during the stress test.     Stress Findings  Equivocal ECG evidence of myocardial ischemia.Indeterminate clinical evidence of myocardial ischemia.       Nuclear Perfusion Findings      Study Impression  Myocardial perfusion imaging indicates a large-sized, moderately severe area of ischemia located in the apex, inferior wall and lateral wall.     Rest Perfusion Defect 1  No rest myocardial perfusion defect noted.     Stress Perfusion Defect 1  There is a large sized defect of moderate severity present in the basal inferior, basal inferolateral, basal anterolateral, mid inferior, mid inferolateral, apical septal and apical lateral wall.     Ventricle Size / Description  Left ventricular ejection fraction is normal (Calculated EF = 56%). Normal LV cavity size. Abnormal LV wall motion consistent with akinesis of the septal wall. RV cavity size not well visualized.       Nuclear Perfusion      Resting         The left ventricular perfusion is normal.         Stress         Moderate count reduction in the following segments: basal inferior, basal inferolateral, basal anterolateral, mid inferior, mid inferolateral, apical lateral and apex.  All other segments are normal.            Perfusion Scores         Score Percentage Abnormal        SRS 0 0.00%      SSS 14 20.59%      SDS 14 20.58%            Britney Deras   Acquired echocardiogram 2D complete with contrast   Order# [id number]    Reading physician: Ramon Campos III, MD   Ordering physician: Aaron  MD Nilo   Study date: 17         Patient Information      Patient Name MRN Sex  (Age)     Britney Deras [id number] Female 1935 (81 y.o.)       Admission Information      Admission Date/Time Discharge Date/Time Room/Bed     17  1251  542/1       Interpretation Summary      · All left ventricular wall segments contract normally.  · Left ventricular wall thickness is consistent with mild concentric hypertrophy.  · Left ventricular function is normal. Estimated EF = 65%.  · Left ventricular diastolic dysfunction (grade II) consistent with pseudonormalization.  · Left atrial cavity size is moderately dilated.         Disposition:    Nursing Home    Patient Instructions: See After Visit Summary for Medications      Follow-up Information     Follow up with [facility] .    Specialty:  Assisted Living Facility    Contact information:    [address] [facility] [address] [phone]            Total time spent discharging patient including evaluation,post hospitalization follow up,  medication and post hospitalization instructions and education total time exceeds 30 minutes.    Signed:  Gustavo Mcgrath MD  2017  9:42 AM      Much of this encounter note is an electronic transcription/translation of spoken language to printed text. The electronic translation of spoken language may permit erroneous, or at times, nonsensical words or phrases to be inadvertently transcribed; Although I have reviewed the note for such errors, some may still exist

## 2017-02-17 NOTE — SIGNIFICANT NOTE
02/17/17 1117   Rehab Treatment   Discipline physical therapist   Rehab Evaluation   Evaluation Not Performed (D/c planned for 12:30 today back to facility. PT will sign off.)

## 2017-02-17 NOTE — PROGRESS NOTES
Continued Stay Note  [facility]     Patient Name: Britney Deras  MRN: [id number]  Today's Date: 2/17/2017    Admit Date: 2/13/2017          Discharge Plan       02/17/17 1056    Case Management/Social Work Plan    Plan [facility].    Patient/Family In Agreement With Plan yes    Additional Comments dc orders noted, CCP called State guardian Mirela Kramer [phone] and left vm of patient being dc today. [facility] 12:30.Packet given to RN. Notified Alesha Redding for [facility]. URIAH GIBSON/CCP              Discharge Codes     None        Expected Discharge Date and Time     Expected Discharge Date Expected Discharge Time    Feb 17, 2017             Velvet Meléndez, RN

## 2017-03-23 ENCOUNTER — APPOINTMENT (OUTPATIENT)
Dept: GENERAL RADIOLOGY | Facility: HOSPITAL | Age: 82
End: 2017-03-23

## 2017-03-23 ENCOUNTER — HOSPITAL ENCOUNTER (INPATIENT)
Facility: HOSPITAL | Age: 82
LOS: 4 days | Discharge: NURSING, REHAB OR LONG-TERM CARE | End: 2017-03-27
Attending: INTERNAL MEDICINE | Admitting: INTERNAL MEDICINE

## 2017-03-23 LAB
ANION GAP SERPL CALCULATED.3IONS-SCNC: 14.4 MMOL/L
BASOPHILS # BLD AUTO: 0.02 10*3/MM3 (ref 0–0.2)
BASOPHILS NFR BLD AUTO: 0.3 % (ref 0–1.5)
BUN BLD-MCNC: 37 MG/DL (ref 8–23)
BUN/CREAT SERPL: 31.9 (ref 7–25)
CALCIUM SPEC-SCNC: 9.3 MG/DL (ref 8.6–10.5)
CHLORIDE SERPL-SCNC: 103 MMOL/L (ref 98–107)
CO2 SERPL-SCNC: 26.6 MMOL/L (ref 22–29)
CREAT BLD-MCNC: 1.16 MG/DL (ref 0.57–1)
DEPRECATED RDW RBC AUTO: 45.2 FL (ref 37–54)
EOSINOPHIL # BLD AUTO: 0.25 10*3/MM3 (ref 0–0.7)
EOSINOPHIL NFR BLD AUTO: 3.2 % (ref 0.3–6.2)
ERYTHROCYTE [DISTWIDTH] IN BLOOD BY AUTOMATED COUNT: 15.2 % (ref 11.7–13)
GFR SERPL CREATININE-BSD FRML MDRD: 45 ML/MIN/1.73
GLUCOSE BLD-MCNC: 216 MG/DL (ref 65–99)
GLUCOSE BLDC GLUCOMTR-MCNC: 239 MG/DL (ref 70–130)
HCT VFR BLD AUTO: 30 % (ref 35.6–45.5)
HGB BLD-MCNC: 9.2 G/DL (ref 11.9–15.5)
IMM GRANULOCYTES # BLD: 0 10*3/MM3 (ref 0–0.03)
IMM GRANULOCYTES NFR BLD: 0 % (ref 0–0.5)
INR PPP: 1.08 (ref 0.9–1.1)
LYMPHOCYTES # BLD AUTO: 1.51 10*3/MM3 (ref 0.9–4.8)
LYMPHOCYTES NFR BLD AUTO: 19.5 % (ref 19.6–45.3)
MCH RBC QN AUTO: 24.9 PG (ref 26.9–32)
MCHC RBC AUTO-ENTMCNC: 30.7 G/DL (ref 32.4–36.3)
MCV RBC AUTO: 81.3 FL (ref 80.5–98.2)
MONOCYTES # BLD AUTO: 0.8 10*3/MM3 (ref 0.2–1.2)
MONOCYTES NFR BLD AUTO: 10.3 % (ref 5–12)
NEUTROPHILS # BLD AUTO: 5.18 10*3/MM3 (ref 1.9–8.1)
NEUTROPHILS NFR BLD AUTO: 66.7 % (ref 42.7–76)
PLATELET # BLD AUTO: 213 10*3/MM3 (ref 140–500)
PMV BLD AUTO: 11.5 FL (ref 6–12)
POTASSIUM BLD-SCNC: 4.5 MMOL/L (ref 3.5–5.2)
PROTHROMBIN TIME: 13.6 SECONDS (ref 11.7–14.2)
RBC # BLD AUTO: 3.69 10*6/MM3 (ref 3.9–5.2)
SODIUM BLD-SCNC: 144 MMOL/L (ref 136–145)
WBC NRBC COR # BLD: 7.76 10*3/MM3 (ref 4.5–10.7)

## 2017-03-23 PROCEDURE — 80048 BASIC METABOLIC PNL TOTAL CA: CPT | Performed by: INTERNAL MEDICINE

## 2017-03-23 PROCEDURE — 94799 UNLISTED PULMONARY SVC/PX: CPT

## 2017-03-23 PROCEDURE — 87086 URINE CULTURE/COLONY COUNT: CPT | Performed by: INTERNAL MEDICINE

## 2017-03-23 PROCEDURE — 25010000002 ENOXAPARIN PER 10 MG: Performed by: INTERNAL MEDICINE

## 2017-03-23 PROCEDURE — 25010000002 FUROSEMIDE PER 20 MG: Performed by: INTERNAL MEDICINE

## 2017-03-23 PROCEDURE — 71020 HC CHEST PA AND LATERAL: CPT

## 2017-03-23 PROCEDURE — 85025 COMPLETE CBC W/AUTO DIFF WBC: CPT | Performed by: INTERNAL MEDICINE

## 2017-03-23 PROCEDURE — 63710000001 INSULIN ASPART PER 5 UNITS: Performed by: INTERNAL MEDICINE

## 2017-03-23 PROCEDURE — 81001 URINALYSIS AUTO W/SCOPE: CPT | Performed by: INTERNAL MEDICINE

## 2017-03-23 PROCEDURE — 85610 PROTHROMBIN TIME: CPT | Performed by: INTERNAL MEDICINE

## 2017-03-23 PROCEDURE — 82962 GLUCOSE BLOOD TEST: CPT

## 2017-03-23 RX ORDER — ASPIRIN 325 MG
325 TABLET ORAL DAILY
Status: DISCONTINUED | OUTPATIENT
Start: 2017-03-23 | End: 2017-03-23

## 2017-03-23 RX ORDER — IPRATROPIUM BROMIDE AND ALBUTEROL SULFATE 2.5; .5 MG/3ML; MG/3ML
3 SOLUTION RESPIRATORY (INHALATION)
Status: DISCONTINUED | OUTPATIENT
Start: 2017-03-23 | End: 2017-03-28 | Stop reason: HOSPADM

## 2017-03-23 RX ORDER — ACETAMINOPHEN 325 MG/1
650 TABLET ORAL EVERY 4 HOURS PRN
Status: DISCONTINUED | OUTPATIENT
Start: 2017-03-23 | End: 2017-03-28 | Stop reason: HOSPADM

## 2017-03-23 RX ORDER — POTASSIUM CHLORIDE 750 MG/1
20 CAPSULE, EXTENDED RELEASE ORAL DAILY
Status: DISCONTINUED | OUTPATIENT
Start: 2017-03-23 | End: 2017-03-28 | Stop reason: HOSPADM

## 2017-03-23 RX ORDER — FUROSEMIDE 10 MG/ML
40 INJECTION INTRAMUSCULAR; INTRAVENOUS EVERY 8 HOURS
Status: DISCONTINUED | OUTPATIENT
Start: 2017-03-23 | End: 2017-03-24

## 2017-03-23 RX ORDER — NITROGLYCERIN 0.4 MG/1
0.4 TABLET SUBLINGUAL
Status: DISCONTINUED | OUTPATIENT
Start: 2017-03-23 | End: 2017-03-28 | Stop reason: HOSPADM

## 2017-03-23 RX ORDER — TRAMADOL HYDROCHLORIDE 50 MG/1
50 TABLET ORAL EVERY 6 HOURS PRN
Status: DISCONTINUED | OUTPATIENT
Start: 2017-03-23 | End: 2017-03-28 | Stop reason: HOSPADM

## 2017-03-23 RX ORDER — ISOSORBIDE MONONITRATE 30 MG/1
30 TABLET, EXTENDED RELEASE ORAL EVERY 12 HOURS
Status: DISCONTINUED | OUTPATIENT
Start: 2017-03-23 | End: 2017-03-28 | Stop reason: HOSPADM

## 2017-03-23 RX ORDER — DEXTROSE MONOHYDRATE 25 G/50ML
25 INJECTION, SOLUTION INTRAVENOUS
Status: DISCONTINUED | OUTPATIENT
Start: 2017-03-23 | End: 2017-03-28 | Stop reason: HOSPADM

## 2017-03-23 RX ORDER — ATORVASTATIN CALCIUM 40 MG/1
40 TABLET, FILM COATED ORAL DAILY
Status: DISCONTINUED | OUTPATIENT
Start: 2017-03-23 | End: 2017-03-23

## 2017-03-23 RX ORDER — HYDRALAZINE HYDROCHLORIDE 50 MG/1
100 TABLET, FILM COATED ORAL EVERY 12 HOURS SCHEDULED
Status: DISCONTINUED | OUTPATIENT
Start: 2017-03-23 | End: 2017-03-25

## 2017-03-23 RX ORDER — RISPERIDONE 1 MG/1
1 TABLET, ORALLY DISINTEGRATING ORAL NIGHTLY
Status: DISCONTINUED | OUTPATIENT
Start: 2017-03-23 | End: 2017-03-28 | Stop reason: HOSPADM

## 2017-03-23 RX ORDER — ASPIRIN 325 MG
325 TABLET ORAL DAILY
Status: DISCONTINUED | OUTPATIENT
Start: 2017-03-24 | End: 2017-03-24

## 2017-03-23 RX ORDER — NICOTINE POLACRILEX 4 MG
15 LOZENGE BUCCAL
Status: DISCONTINUED | OUTPATIENT
Start: 2017-03-23 | End: 2017-03-28 | Stop reason: HOSPADM

## 2017-03-23 RX ORDER — ATORVASTATIN CALCIUM 40 MG/1
40 TABLET, FILM COATED ORAL NIGHTLY
Status: DISCONTINUED | OUTPATIENT
Start: 2017-03-23 | End: 2017-03-28 | Stop reason: HOSPADM

## 2017-03-23 RX ORDER — METOPROLOL TARTRATE 50 MG/1
50 TABLET, FILM COATED ORAL EVERY 12 HOURS SCHEDULED
Status: DISCONTINUED | OUTPATIENT
Start: 2017-03-23 | End: 2017-03-28 | Stop reason: HOSPADM

## 2017-03-23 RX ORDER — LISINOPRIL 40 MG/1
40 TABLET ORAL DAILY
Status: DISCONTINUED | OUTPATIENT
Start: 2017-03-23 | End: 2017-03-23

## 2017-03-23 RX ORDER — LISINOPRIL 40 MG/1
40 TABLET ORAL DAILY
Status: DISCONTINUED | OUTPATIENT
Start: 2017-03-24 | End: 2017-03-28 | Stop reason: HOSPADM

## 2017-03-23 RX ADMIN — HYDRALAZINE HYDROCHLORIDE 100 MG: 50 TABLET, FILM COATED ORAL at 20:43

## 2017-03-23 RX ADMIN — FUROSEMIDE 40 MG: 10 INJECTION, SOLUTION INTRAMUSCULAR; INTRAVENOUS at 20:47

## 2017-03-23 RX ADMIN — IPRATROPIUM BROMIDE AND ALBUTEROL SULFATE 3 ML: .5; 3 SOLUTION RESPIRATORY (INHALATION) at 21:11

## 2017-03-23 RX ADMIN — ENOXAPARIN SODIUM 30 MG: 30 INJECTION SUBCUTANEOUS at 20:43

## 2017-03-23 RX ADMIN — ATORVASTATIN CALCIUM 40 MG: 40 TABLET, FILM COATED ORAL at 20:42

## 2017-03-23 RX ADMIN — METOPROLOL TARTRATE 50 MG: 50 TABLET ORAL at 20:42

## 2017-03-23 RX ADMIN — CLONIDINE 1 PATCH: 0.2 PATCH TRANSDERMAL at 20:41

## 2017-03-23 RX ADMIN — RISPERIDONE 1 MG: 1 TABLET, ORALLY DISINTEGRATING ORAL at 20:44

## 2017-03-23 RX ADMIN — INSULIN ASPART 3 UNITS: 100 INJECTION, SOLUTION INTRAVENOUS; SUBCUTANEOUS at 21:02

## 2017-03-23 RX ADMIN — ISOSORBIDE MONONITRATE 30 MG: 30 TABLET, EXTENDED RELEASE ORAL at 20:47

## 2017-03-23 RX ADMIN — POTASSIUM CHLORIDE 20 MEQ: 750 CAPSULE, EXTENDED RELEASE ORAL at 20:48

## 2017-03-24 PROBLEM — I50.9 ACUTE EXACERBATION OF CHF (CONGESTIVE HEART FAILURE) (HCC): Status: ACTIVE | Noted: 2017-03-24

## 2017-03-24 LAB
ALBUMIN SERPL-MCNC: 3.2 G/DL (ref 3.5–5.2)
ALBUMIN/GLOB SERPL: 1.2 G/DL
ALP SERPL-CCNC: 92 U/L (ref 39–117)
ALT SERPL W P-5'-P-CCNC: 29 U/L (ref 1–33)
ANION GAP SERPL CALCULATED.3IONS-SCNC: 14.6 MMOL/L
AST SERPL-CCNC: 19 U/L (ref 1–32)
BACTERIA UR QL AUTO: ABNORMAL /HPF
BILIRUB SERPL-MCNC: 0.4 MG/DL (ref 0.1–1.2)
BILIRUB UR QL STRIP: NEGATIVE
BUN BLD-MCNC: 38 MG/DL (ref 8–23)
BUN/CREAT SERPL: 31.7 (ref 7–25)
CALCIUM SPEC-SCNC: 8.8 MG/DL (ref 8.6–10.5)
CHLORIDE SERPL-SCNC: 102 MMOL/L (ref 98–107)
CLARITY UR: CLEAR
CO2 SERPL-SCNC: 25.4 MMOL/L (ref 22–29)
COLOR UR: YELLOW
CREAT BLD-MCNC: 1.2 MG/DL (ref 0.57–1)
DEPRECATED RDW RBC AUTO: 45.3 FL (ref 37–54)
ERYTHROCYTE [DISTWIDTH] IN BLOOD BY AUTOMATED COUNT: 14.9 % (ref 11.7–13)
GFR SERPL CREATININE-BSD FRML MDRD: 43 ML/MIN/1.73
GLOBULIN UR ELPH-MCNC: 2.6 GM/DL
GLUCOSE BLD-MCNC: 199 MG/DL (ref 65–99)
GLUCOSE BLDC GLUCOMTR-MCNC: 156 MG/DL (ref 70–130)
GLUCOSE BLDC GLUCOMTR-MCNC: 194 MG/DL (ref 70–130)
GLUCOSE BLDC GLUCOMTR-MCNC: 80 MG/DL (ref 70–130)
GLUCOSE UR STRIP-MCNC: NEGATIVE MG/DL
HBA1C MFR BLD: 8.32 % (ref 4.8–5.6)
HCT VFR BLD AUTO: 27.8 % (ref 35.6–45.5)
HGB BLD-MCNC: 8.5 G/DL (ref 11.9–15.5)
HGB UR QL STRIP.AUTO: NEGATIVE
HYALINE CASTS UR QL AUTO: ABNORMAL /LPF
KETONES UR QL STRIP: NEGATIVE
LEUKOCYTE ESTERASE UR QL STRIP.AUTO: ABNORMAL
MCH RBC QN AUTO: 25.3 PG (ref 26.9–32)
MCHC RBC AUTO-ENTMCNC: 30.6 G/DL (ref 32.4–36.3)
MCV RBC AUTO: 82.7 FL (ref 80.5–98.2)
NITRITE UR QL STRIP: NEGATIVE
NT-PROBNP SERPL-MCNC: 1982 PG/ML (ref 0–1800)
PH UR STRIP.AUTO: <=5 [PH] (ref 5–8)
PLATELET # BLD AUTO: 190 10*3/MM3 (ref 140–500)
PMV BLD AUTO: 11.4 FL (ref 6–12)
POTASSIUM BLD-SCNC: 4.5 MMOL/L (ref 3.5–5.2)
PROT SERPL-MCNC: 5.8 G/DL (ref 6–8.5)
PROT UR QL STRIP: NEGATIVE
RBC # BLD AUTO: 3.36 10*6/MM3 (ref 3.9–5.2)
RBC # UR: ABNORMAL /HPF
REF LAB TEST METHOD: ABNORMAL
SODIUM BLD-SCNC: 142 MMOL/L (ref 136–145)
SP GR UR STRIP: 1.01 (ref 1–1.03)
SQUAMOUS #/AREA URNS HPF: ABNORMAL /HPF
TROPONIN T SERPL-MCNC: <0.01 NG/ML (ref 0–0.03)
UROBILINOGEN UR QL STRIP: ABNORMAL
WBC NRBC COR # BLD: 7.56 10*3/MM3 (ref 4.5–10.7)
WBC UR QL AUTO: ABNORMAL /HPF

## 2017-03-24 PROCEDURE — 63710000001 INSULIN ASPART PER 5 UNITS: Performed by: INTERNAL MEDICINE

## 2017-03-24 PROCEDURE — 83036 HEMOGLOBIN GLYCOSYLATED A1C: CPT | Performed by: INTERNAL MEDICINE

## 2017-03-24 PROCEDURE — 94799 UNLISTED PULMONARY SVC/PX: CPT

## 2017-03-24 PROCEDURE — 25010000002 ENOXAPARIN PER 10 MG: Performed by: INTERNAL MEDICINE

## 2017-03-24 PROCEDURE — 83880 ASSAY OF NATRIURETIC PEPTIDE: CPT | Performed by: INTERNAL MEDICINE

## 2017-03-24 PROCEDURE — 25010000002 FUROSEMIDE PER 20 MG: Performed by: INTERNAL MEDICINE

## 2017-03-24 PROCEDURE — 93010 ELECTROCARDIOGRAM REPORT: CPT | Performed by: INTERNAL MEDICINE

## 2017-03-24 PROCEDURE — 80053 COMPREHEN METABOLIC PANEL: CPT | Performed by: INTERNAL MEDICINE

## 2017-03-24 PROCEDURE — 93005 ELECTROCARDIOGRAM TRACING: CPT | Performed by: INTERNAL MEDICINE

## 2017-03-24 PROCEDURE — 82962 GLUCOSE BLOOD TEST: CPT

## 2017-03-24 PROCEDURE — 99223 1ST HOSP IP/OBS HIGH 75: CPT | Performed by: INTERNAL MEDICINE

## 2017-03-24 PROCEDURE — 84484 ASSAY OF TROPONIN QUANT: CPT | Performed by: INTERNAL MEDICINE

## 2017-03-24 PROCEDURE — 85027 COMPLETE CBC AUTOMATED: CPT | Performed by: INTERNAL MEDICINE

## 2017-03-24 RX ORDER — ASPIRIN 81 MG/1
81 TABLET ORAL DAILY
Status: DISCONTINUED | OUTPATIENT
Start: 2017-03-24 | End: 2017-03-28 | Stop reason: HOSPADM

## 2017-03-24 RX ORDER — FUROSEMIDE 10 MG/ML
40 INJECTION INTRAMUSCULAR; INTRAVENOUS EVERY 12 HOURS
Status: DISCONTINUED | OUTPATIENT
Start: 2017-03-25 | End: 2017-03-25

## 2017-03-24 RX ADMIN — HYDRALAZINE HYDROCHLORIDE 50 MG: 50 TABLET, FILM COATED ORAL at 08:08

## 2017-03-24 RX ADMIN — METOPROLOL TARTRATE 50 MG: 50 TABLET ORAL at 21:17

## 2017-03-24 RX ADMIN — IPRATROPIUM BROMIDE AND ALBUTEROL SULFATE 3 ML: .5; 3 SOLUTION RESPIRATORY (INHALATION) at 22:19

## 2017-03-24 RX ADMIN — POTASSIUM CHLORIDE 10 MEQ: 750 CAPSULE, EXTENDED RELEASE ORAL at 08:08

## 2017-03-24 RX ADMIN — INSULIN ASPART 2 UNITS: 100 INJECTION, SOLUTION INTRAVENOUS; SUBCUTANEOUS at 12:11

## 2017-03-24 RX ADMIN — METOPROLOL TARTRATE 50 MG: 50 TABLET ORAL at 08:08

## 2017-03-24 RX ADMIN — RISPERIDONE 1 MG: 1 TABLET, ORALLY DISINTEGRATING ORAL at 21:18

## 2017-03-24 RX ADMIN — ATORVASTATIN CALCIUM 40 MG: 40 TABLET, FILM COATED ORAL at 21:18

## 2017-03-24 RX ADMIN — INSULIN ASPART 2 UNITS: 100 INJECTION, SOLUTION INTRAVENOUS; SUBCUTANEOUS at 08:10

## 2017-03-24 RX ADMIN — ISOSORBIDE MONONITRATE 30 MG: 30 TABLET, EXTENDED RELEASE ORAL at 17:15

## 2017-03-24 RX ADMIN — IPRATROPIUM BROMIDE AND ALBUTEROL SULFATE 3 ML: .5; 3 SOLUTION RESPIRATORY (INHALATION) at 10:46

## 2017-03-24 RX ADMIN — ASPIRIN 325 MG: 325 TABLET ORAL at 08:09

## 2017-03-24 RX ADMIN — ISOSORBIDE MONONITRATE 30 MG: 30 TABLET, EXTENDED RELEASE ORAL at 06:39

## 2017-03-24 RX ADMIN — FUROSEMIDE 40 MG: 10 INJECTION, SOLUTION INTRAMUSCULAR; INTRAVENOUS at 01:17

## 2017-03-24 RX ADMIN — FUROSEMIDE 40 MG: 10 INJECTION, SOLUTION INTRAMUSCULAR; INTRAVENOUS at 17:15

## 2017-03-24 RX ADMIN — FUROSEMIDE 40 MG: 10 INJECTION, SOLUTION INTRAMUSCULAR; INTRAVENOUS at 08:08

## 2017-03-24 RX ADMIN — INSULIN ASPART 10 UNITS: 100 INJECTION, SOLUTION INTRAVENOUS; SUBCUTANEOUS at 08:11

## 2017-03-24 RX ADMIN — INSULIN ASPART 8 UNITS: 100 INJECTION, SOLUTION INTRAVENOUS; SUBCUTANEOUS at 12:10

## 2017-03-24 RX ADMIN — IPRATROPIUM BROMIDE AND ALBUTEROL SULFATE 3 ML: .5; 3 SOLUTION RESPIRATORY (INHALATION) at 08:01

## 2017-03-24 RX ADMIN — IPRATROPIUM BROMIDE AND ALBUTEROL SULFATE 3 ML: .5; 3 SOLUTION RESPIRATORY (INHALATION) at 15:13

## 2017-03-24 RX ADMIN — ENOXAPARIN SODIUM 30 MG: 30 INJECTION SUBCUTANEOUS at 17:15

## 2017-03-24 RX ADMIN — TRAMADOL HYDROCHLORIDE 50 MG: 50 TABLET, COATED ORAL at 00:53

## 2017-03-24 RX ADMIN — HYDRALAZINE HYDROCHLORIDE 100 MG: 50 TABLET, FILM COATED ORAL at 21:18

## 2017-03-24 RX ADMIN — LISINOPRIL 40 MG: 40 TABLET ORAL at 08:09

## 2017-03-25 PROBLEM — I50.32 CHRONIC DIASTOLIC (CONGESTIVE) HEART FAILURE (HCC): Status: ACTIVE | Noted: 2017-03-25

## 2017-03-25 PROBLEM — R60.0 BILATERAL EDEMA OF LOWER EXTREMITY: Status: ACTIVE | Noted: 2017-03-25

## 2017-03-25 LAB
ANION GAP SERPL CALCULATED.3IONS-SCNC: 15.9 MMOL/L
BACTERIA SPEC AEROBE CULT: NORMAL
BUN BLD-MCNC: 40 MG/DL (ref 8–23)
BUN/CREAT SERPL: 30.3 (ref 7–25)
CALCIUM SPEC-SCNC: 8.7 MG/DL (ref 8.6–10.5)
CHLORIDE SERPL-SCNC: 102 MMOL/L (ref 98–107)
CO2 SERPL-SCNC: 24.1 MMOL/L (ref 22–29)
CREAT BLD-MCNC: 1.32 MG/DL (ref 0.57–1)
DEPRECATED RDW RBC AUTO: 43 FL (ref 37–54)
ERYTHROCYTE [DISTWIDTH] IN BLOOD BY AUTOMATED COUNT: 15.1 % (ref 11.7–13)
GFR SERPL CREATININE-BSD FRML MDRD: 39 ML/MIN/1.73
GLUCOSE BLD-MCNC: 141 MG/DL (ref 65–99)
GLUCOSE BLDC GLUCOMTR-MCNC: 102 MG/DL (ref 70–130)
GLUCOSE BLDC GLUCOMTR-MCNC: 151 MG/DL (ref 70–130)
GLUCOSE BLDC GLUCOMTR-MCNC: 207 MG/DL (ref 70–130)
GLUCOSE BLDC GLUCOMTR-MCNC: 91 MG/DL (ref 70–130)
HCT VFR BLD AUTO: 29.9 % (ref 35.6–45.5)
HGB BLD-MCNC: 9.4 G/DL (ref 11.9–15.5)
MCH RBC QN AUTO: 24.4 PG (ref 26.9–32)
MCHC RBC AUTO-ENTMCNC: 31.4 G/DL (ref 32.4–36.3)
MCV RBC AUTO: 77.7 FL (ref 80.5–98.2)
NT-PROBNP SERPL-MCNC: 1465 PG/ML (ref 0–1800)
PLATELET # BLD AUTO: 172 10*3/MM3 (ref 140–500)
PMV BLD AUTO: 11.3 FL (ref 6–12)
POTASSIUM BLD-SCNC: 4.7 MMOL/L (ref 3.5–5.2)
RBC # BLD AUTO: 3.85 10*6/MM3 (ref 3.9–5.2)
SODIUM BLD-SCNC: 142 MMOL/L (ref 136–145)
WBC NRBC COR # BLD: 8 10*3/MM3 (ref 4.5–10.7)

## 2017-03-25 PROCEDURE — 94799 UNLISTED PULMONARY SVC/PX: CPT

## 2017-03-25 PROCEDURE — 63710000001 INSULIN ASPART PER 5 UNITS: Performed by: INTERNAL MEDICINE

## 2017-03-25 PROCEDURE — 83880 ASSAY OF NATRIURETIC PEPTIDE: CPT | Performed by: INTERNAL MEDICINE

## 2017-03-25 PROCEDURE — 99232 SBSQ HOSP IP/OBS MODERATE 35: CPT | Performed by: INTERNAL MEDICINE

## 2017-03-25 PROCEDURE — 85027 COMPLETE CBC AUTOMATED: CPT | Performed by: INTERNAL MEDICINE

## 2017-03-25 PROCEDURE — 25010000002 FUROSEMIDE PER 20 MG: Performed by: INTERNAL MEDICINE

## 2017-03-25 PROCEDURE — 82962 GLUCOSE BLOOD TEST: CPT

## 2017-03-25 PROCEDURE — 25010000002 ENOXAPARIN PER 10 MG: Performed by: INTERNAL MEDICINE

## 2017-03-25 PROCEDURE — 80048 BASIC METABOLIC PNL TOTAL CA: CPT | Performed by: INTERNAL MEDICINE

## 2017-03-25 RX ORDER — FUROSEMIDE 40 MG/1
40 TABLET ORAL 2 TIMES DAILY
Status: DISCONTINUED | OUTPATIENT
Start: 2017-03-25 | End: 2017-03-28 | Stop reason: HOSPADM

## 2017-03-25 RX ORDER — HYDRALAZINE HYDROCHLORIDE 50 MG/1
100 TABLET, FILM COATED ORAL EVERY 8 HOURS SCHEDULED
Status: DISCONTINUED | OUTPATIENT
Start: 2017-03-25 | End: 2017-03-28 | Stop reason: HOSPADM

## 2017-03-25 RX ADMIN — IPRATROPIUM BROMIDE AND ALBUTEROL SULFATE 3 ML: .5; 3 SOLUTION RESPIRATORY (INHALATION) at 20:00

## 2017-03-25 RX ADMIN — LISINOPRIL 40 MG: 40 TABLET ORAL at 08:45

## 2017-03-25 RX ADMIN — METOPROLOL TARTRATE 50 MG: 50 TABLET ORAL at 21:30

## 2017-03-25 RX ADMIN — METOPROLOL TARTRATE 50 MG: 50 TABLET ORAL at 08:45

## 2017-03-25 RX ADMIN — RISPERIDONE 1 MG: 1 TABLET, ORALLY DISINTEGRATING ORAL at 21:30

## 2017-03-25 RX ADMIN — IPRATROPIUM BROMIDE AND ALBUTEROL SULFATE 3 ML: .5; 3 SOLUTION RESPIRATORY (INHALATION) at 07:05

## 2017-03-25 RX ADMIN — POTASSIUM CHLORIDE 20 MEQ: 750 CAPSULE, EXTENDED RELEASE ORAL at 08:45

## 2017-03-25 RX ADMIN — ISOSORBIDE MONONITRATE 30 MG: 30 TABLET, EXTENDED RELEASE ORAL at 18:14

## 2017-03-25 RX ADMIN — ENOXAPARIN SODIUM 30 MG: 30 INJECTION SUBCUTANEOUS at 18:14

## 2017-03-25 RX ADMIN — HYDRALAZINE HYDROCHLORIDE 100 MG: 50 TABLET, FILM COATED ORAL at 08:46

## 2017-03-25 RX ADMIN — INSULIN ASPART 8 UNITS: 100 INJECTION, SOLUTION INTRAVENOUS; SUBCUTANEOUS at 12:32

## 2017-03-25 RX ADMIN — IPRATROPIUM BROMIDE AND ALBUTEROL SULFATE 3 ML: .5; 3 SOLUTION RESPIRATORY (INHALATION) at 14:57

## 2017-03-25 RX ADMIN — FUROSEMIDE 40 MG: 10 INJECTION, SOLUTION INTRAMUSCULAR; INTRAVENOUS at 01:31

## 2017-03-25 RX ADMIN — ATORVASTATIN CALCIUM 40 MG: 40 TABLET, FILM COATED ORAL at 21:30

## 2017-03-25 RX ADMIN — INSULIN ASPART 10 UNITS: 100 INJECTION, SOLUTION INTRAVENOUS; SUBCUTANEOUS at 08:55

## 2017-03-25 RX ADMIN — FUROSEMIDE 40 MG: 40 TABLET ORAL at 18:14

## 2017-03-25 RX ADMIN — ASPIRIN 81 MG: 81 TABLET ORAL at 08:46

## 2017-03-25 RX ADMIN — HYDRALAZINE HYDROCHLORIDE 100 MG: 50 TABLET, FILM COATED ORAL at 21:29

## 2017-03-25 RX ADMIN — INSULIN ASPART 10 UNITS: 100 INJECTION, SOLUTION INTRAVENOUS; SUBCUTANEOUS at 18:14

## 2017-03-25 RX ADMIN — INSULIN ASPART 3 UNITS: 100 INJECTION, SOLUTION INTRAVENOUS; SUBCUTANEOUS at 12:33

## 2017-03-25 RX ADMIN — FUROSEMIDE 40 MG: 10 INJECTION, SOLUTION INTRAMUSCULAR; INTRAVENOUS at 12:32

## 2017-03-25 RX ADMIN — HYDRALAZINE HYDROCHLORIDE 100 MG: 50 TABLET, FILM COATED ORAL at 15:25

## 2017-03-25 RX ADMIN — IPRATROPIUM BROMIDE AND ALBUTEROL SULFATE 3 ML: .5; 3 SOLUTION RESPIRATORY (INHALATION) at 11:03

## 2017-03-25 RX ADMIN — TRAMADOL HYDROCHLORIDE 50 MG: 50 TABLET, COATED ORAL at 08:52

## 2017-03-25 RX ADMIN — ISOSORBIDE MONONITRATE 30 MG: 30 TABLET, EXTENDED RELEASE ORAL at 06:27

## 2017-03-26 PROBLEM — I89.0 LYMPHEDEMA OF BOTH LOWER EXTREMITIES: Status: ACTIVE | Noted: 2017-03-25

## 2017-03-26 LAB
ANION GAP SERPL CALCULATED.3IONS-SCNC: 13 MMOL/L
BASOPHILS # BLD AUTO: 0.02 10*3/MM3 (ref 0–0.2)
BASOPHILS NFR BLD AUTO: 0.3 % (ref 0–1.5)
BUN BLD-MCNC: 37 MG/DL (ref 8–23)
BUN/CREAT SERPL: 34.3 (ref 7–25)
CALCIUM SPEC-SCNC: 8.5 MG/DL (ref 8.6–10.5)
CHLORIDE SERPL-SCNC: 102 MMOL/L (ref 98–107)
CO2 SERPL-SCNC: 27 MMOL/L (ref 22–29)
CREAT BLD-MCNC: 1.08 MG/DL (ref 0.57–1)
DEPRECATED RDW RBC AUTO: 45 FL (ref 37–54)
EOSINOPHIL # BLD AUTO: 0.29 10*3/MM3 (ref 0–0.7)
EOSINOPHIL NFR BLD AUTO: 4.4 % (ref 0.3–6.2)
ERYTHROCYTE [DISTWIDTH] IN BLOOD BY AUTOMATED COUNT: 15.2 % (ref 11.7–13)
GFR SERPL CREATININE-BSD FRML MDRD: 49 ML/MIN/1.73
GLUCOSE BLD-MCNC: 106 MG/DL (ref 65–99)
GLUCOSE BLDC GLUCOMTR-MCNC: 111 MG/DL (ref 70–130)
GLUCOSE BLDC GLUCOMTR-MCNC: 182 MG/DL (ref 70–130)
GLUCOSE BLDC GLUCOMTR-MCNC: 195 MG/DL (ref 70–130)
GLUCOSE BLDC GLUCOMTR-MCNC: 321 MG/DL (ref 70–130)
HCT VFR BLD AUTO: 28.1 % (ref 35.6–45.5)
HGB BLD-MCNC: 8.5 G/DL (ref 11.9–15.5)
IMM GRANULOCYTES # BLD: 0 10*3/MM3 (ref 0–0.03)
IMM GRANULOCYTES NFR BLD: 0 % (ref 0–0.5)
LYMPHOCYTES # BLD AUTO: 2.07 10*3/MM3 (ref 0.9–4.8)
LYMPHOCYTES NFR BLD AUTO: 31.4 % (ref 19.6–45.3)
MCH RBC QN AUTO: 24.6 PG (ref 26.9–32)
MCHC RBC AUTO-ENTMCNC: 30.2 G/DL (ref 32.4–36.3)
MCV RBC AUTO: 81.4 FL (ref 80.5–98.2)
MONOCYTES # BLD AUTO: 0.58 10*3/MM3 (ref 0.2–1.2)
MONOCYTES NFR BLD AUTO: 8.8 % (ref 5–12)
NEUTROPHILS # BLD AUTO: 3.64 10*3/MM3 (ref 1.9–8.1)
NEUTROPHILS NFR BLD AUTO: 55.1 % (ref 42.7–76)
PLATELET # BLD AUTO: 211 10*3/MM3 (ref 140–500)
PMV BLD AUTO: 11 FL (ref 6–12)
POTASSIUM BLD-SCNC: 4.2 MMOL/L (ref 3.5–5.2)
RBC # BLD AUTO: 3.45 10*6/MM3 (ref 3.9–5.2)
SODIUM BLD-SCNC: 142 MMOL/L (ref 136–145)
WBC NRBC COR # BLD: 6.6 10*3/MM3 (ref 4.5–10.7)

## 2017-03-26 PROCEDURE — 94799 UNLISTED PULMONARY SVC/PX: CPT

## 2017-03-26 PROCEDURE — 63710000001 INSULIN ASPART PER 5 UNITS: Performed by: INTERNAL MEDICINE

## 2017-03-26 PROCEDURE — 82962 GLUCOSE BLOOD TEST: CPT

## 2017-03-26 PROCEDURE — 25010000002 ENOXAPARIN PER 10 MG: Performed by: INTERNAL MEDICINE

## 2017-03-26 PROCEDURE — 85025 COMPLETE CBC W/AUTO DIFF WBC: CPT | Performed by: INTERNAL MEDICINE

## 2017-03-26 PROCEDURE — 80048 BASIC METABOLIC PNL TOTAL CA: CPT | Performed by: INTERNAL MEDICINE

## 2017-03-26 RX ADMIN — ASPIRIN 81 MG: 81 TABLET ORAL at 08:45

## 2017-03-26 RX ADMIN — TRAMADOL HYDROCHLORIDE 50 MG: 50 TABLET, COATED ORAL at 23:57

## 2017-03-26 RX ADMIN — TRAMADOL HYDROCHLORIDE 50 MG: 50 TABLET, COATED ORAL at 13:19

## 2017-03-26 RX ADMIN — ISOSORBIDE MONONITRATE 30 MG: 30 TABLET, EXTENDED RELEASE ORAL at 06:01

## 2017-03-26 RX ADMIN — INSULIN ASPART 2 UNITS: 100 INJECTION, SOLUTION INTRAVENOUS; SUBCUTANEOUS at 12:28

## 2017-03-26 RX ADMIN — FUROSEMIDE 40 MG: 40 TABLET ORAL at 18:07

## 2017-03-26 RX ADMIN — FUROSEMIDE 40 MG: 40 TABLET ORAL at 08:45

## 2017-03-26 RX ADMIN — METOPROLOL TARTRATE 50 MG: 50 TABLET ORAL at 08:45

## 2017-03-26 RX ADMIN — ATORVASTATIN CALCIUM 40 MG: 40 TABLET, FILM COATED ORAL at 21:43

## 2017-03-26 RX ADMIN — INSULIN ASPART 8 UNITS: 100 INJECTION, SOLUTION INTRAVENOUS; SUBCUTANEOUS at 12:27

## 2017-03-26 RX ADMIN — METOPROLOL TARTRATE 50 MG: 50 TABLET ORAL at 21:43

## 2017-03-26 RX ADMIN — INSULIN ASPART 10 UNITS: 100 INJECTION, SOLUTION INTRAVENOUS; SUBCUTANEOUS at 08:45

## 2017-03-26 RX ADMIN — LISINOPRIL 40 MG: 40 TABLET ORAL at 08:45

## 2017-03-26 RX ADMIN — ENOXAPARIN SODIUM 30 MG: 30 INJECTION SUBCUTANEOUS at 18:07

## 2017-03-26 RX ADMIN — INSULIN ASPART 5 UNITS: 100 INJECTION, SOLUTION INTRAVENOUS; SUBCUTANEOUS at 18:08

## 2017-03-26 RX ADMIN — RISPERIDONE 1 MG: 1 TABLET, ORALLY DISINTEGRATING ORAL at 21:43

## 2017-03-26 RX ADMIN — HYDRALAZINE HYDROCHLORIDE 100 MG: 50 TABLET, FILM COATED ORAL at 21:43

## 2017-03-26 RX ADMIN — POTASSIUM CHLORIDE 20 MEQ: 750 CAPSULE, EXTENDED RELEASE ORAL at 08:45

## 2017-03-26 RX ADMIN — IPRATROPIUM BROMIDE AND ALBUTEROL SULFATE 3 ML: .5; 3 SOLUTION RESPIRATORY (INHALATION) at 21:17

## 2017-03-26 RX ADMIN — INSULIN ASPART 2 UNITS: 100 INJECTION, SOLUTION INTRAVENOUS; SUBCUTANEOUS at 21:43

## 2017-03-26 RX ADMIN — INSULIN ASPART 10 UNITS: 100 INJECTION, SOLUTION INTRAVENOUS; SUBCUTANEOUS at 18:07

## 2017-03-26 RX ADMIN — HYDRALAZINE HYDROCHLORIDE 100 MG: 50 TABLET, FILM COATED ORAL at 12:28

## 2017-03-26 RX ADMIN — ISOSORBIDE MONONITRATE 30 MG: 30 TABLET, EXTENDED RELEASE ORAL at 18:07

## 2017-03-26 RX ADMIN — HYDRALAZINE HYDROCHLORIDE 100 MG: 50 TABLET, FILM COATED ORAL at 06:01

## 2017-03-26 RX ADMIN — IPRATROPIUM BROMIDE AND ALBUTEROL SULFATE 3 ML: .5; 3 SOLUTION RESPIRATORY (INHALATION) at 07:35

## 2017-03-27 VITALS
BODY MASS INDEX: 46.11 KG/M2 | DIASTOLIC BLOOD PRESSURE: 58 MMHG | SYSTOLIC BLOOD PRESSURE: 152 MMHG | OXYGEN SATURATION: 99 % | RESPIRATION RATE: 18 BRPM | TEMPERATURE: 98.9 F | HEIGHT: 64 IN | HEART RATE: 66 BPM | WEIGHT: 270.1 LBS

## 2017-03-27 LAB
ANION GAP SERPL CALCULATED.3IONS-SCNC: 11.2 MMOL/L
BUN BLD-MCNC: 33 MG/DL (ref 8–23)
BUN/CREAT SERPL: 28.7 (ref 7–25)
CALCIUM SPEC-SCNC: 8.5 MG/DL (ref 8.6–10.5)
CHLORIDE SERPL-SCNC: 102 MMOL/L (ref 98–107)
CO2 SERPL-SCNC: 27.8 MMOL/L (ref 22–29)
CREAT BLD-MCNC: 1.15 MG/DL (ref 0.57–1)
DEPRECATED RDW RBC AUTO: 44.7 FL (ref 37–54)
ERYTHROCYTE [DISTWIDTH] IN BLOOD BY AUTOMATED COUNT: 14.8 % (ref 11.7–13)
GFR SERPL CREATININE-BSD FRML MDRD: 45 ML/MIN/1.73
GLUCOSE BLD-MCNC: 173 MG/DL (ref 65–99)
GLUCOSE BLDC GLUCOMTR-MCNC: 132 MG/DL (ref 70–130)
GLUCOSE BLDC GLUCOMTR-MCNC: 165 MG/DL (ref 70–130)
GLUCOSE BLDC GLUCOMTR-MCNC: 354 MG/DL (ref 70–130)
HCT VFR BLD AUTO: 29.3 % (ref 35.6–45.5)
HGB BLD-MCNC: 8.8 G/DL (ref 11.9–15.5)
MCH RBC QN AUTO: 24.8 PG (ref 26.9–32)
MCHC RBC AUTO-ENTMCNC: 30 G/DL (ref 32.4–36.3)
MCV RBC AUTO: 82.5 FL (ref 80.5–98.2)
NT-PROBNP SERPL-MCNC: 1304 PG/ML (ref 0–1800)
PLATELET # BLD AUTO: 208 10*3/MM3 (ref 140–500)
PMV BLD AUTO: 11.1 FL (ref 6–12)
POTASSIUM BLD-SCNC: 4.1 MMOL/L (ref 3.5–5.2)
RBC # BLD AUTO: 3.55 10*6/MM3 (ref 3.9–5.2)
SODIUM BLD-SCNC: 141 MMOL/L (ref 136–145)
WBC NRBC COR # BLD: 7.38 10*3/MM3 (ref 4.5–10.7)

## 2017-03-27 PROCEDURE — 82962 GLUCOSE BLOOD TEST: CPT

## 2017-03-27 PROCEDURE — 94799 UNLISTED PULMONARY SVC/PX: CPT

## 2017-03-27 PROCEDURE — 25010000002 ENOXAPARIN PER 10 MG: Performed by: INTERNAL MEDICINE

## 2017-03-27 PROCEDURE — 63710000001 INSULIN ASPART PER 5 UNITS: Performed by: INTERNAL MEDICINE

## 2017-03-27 PROCEDURE — 83880 ASSAY OF NATRIURETIC PEPTIDE: CPT | Performed by: INTERNAL MEDICINE

## 2017-03-27 PROCEDURE — 80048 BASIC METABOLIC PNL TOTAL CA: CPT | Performed by: INTERNAL MEDICINE

## 2017-03-27 PROCEDURE — 85027 COMPLETE CBC AUTOMATED: CPT | Performed by: INTERNAL MEDICINE

## 2017-03-27 RX ORDER — TRAMADOL HYDROCHLORIDE 50 MG/1
50 TABLET ORAL EVERY 6 HOURS PRN
Qty: 120 TABLET | Refills: 0 | Status: SHIPPED | OUTPATIENT
Start: 2017-03-27 | End: 2018-01-09 | Stop reason: ALTCHOICE

## 2017-03-27 RX ORDER — HYDRALAZINE HYDROCHLORIDE 100 MG/1
100 TABLET, FILM COATED ORAL EVERY 8 HOURS SCHEDULED
Qty: 90 TABLET | Refills: 2 | Status: SHIPPED | OUTPATIENT
Start: 2017-03-27

## 2017-03-27 RX ADMIN — ISOSORBIDE MONONITRATE 30 MG: 30 TABLET, EXTENDED RELEASE ORAL at 09:18

## 2017-03-27 RX ADMIN — ASPIRIN 81 MG: 81 TABLET ORAL at 09:17

## 2017-03-27 RX ADMIN — HYDRALAZINE HYDROCHLORIDE 100 MG: 50 TABLET, FILM COATED ORAL at 09:17

## 2017-03-27 RX ADMIN — INSULIN ASPART 2 UNITS: 100 INJECTION, SOLUTION INTRAVENOUS; SUBCUTANEOUS at 09:16

## 2017-03-27 RX ADMIN — FUROSEMIDE 40 MG: 40 TABLET ORAL at 09:17

## 2017-03-27 RX ADMIN — INSULIN ASPART 6 UNITS: 100 INJECTION, SOLUTION INTRAVENOUS; SUBCUTANEOUS at 12:03

## 2017-03-27 RX ADMIN — IPRATROPIUM BROMIDE AND ALBUTEROL SULFATE 3 ML: .5; 3 SOLUTION RESPIRATORY (INHALATION) at 14:51

## 2017-03-27 RX ADMIN — HYDRALAZINE HYDROCHLORIDE 100 MG: 50 TABLET, FILM COATED ORAL at 14:55

## 2017-03-27 RX ADMIN — LISINOPRIL 40 MG: 40 TABLET ORAL at 09:17

## 2017-03-27 RX ADMIN — IPRATROPIUM BROMIDE AND ALBUTEROL SULFATE 3 ML: .5; 3 SOLUTION RESPIRATORY (INHALATION) at 10:40

## 2017-03-27 RX ADMIN — POTASSIUM CHLORIDE 20 MEQ: 750 CAPSULE, EXTENDED RELEASE ORAL at 09:17

## 2017-03-27 RX ADMIN — METOPROLOL TARTRATE 50 MG: 50 TABLET ORAL at 09:17

## 2017-03-27 RX ADMIN — FUROSEMIDE 40 MG: 40 TABLET ORAL at 16:36

## 2017-03-27 RX ADMIN — INSULIN ASPART 10 UNITS: 100 INJECTION, SOLUTION INTRAVENOUS; SUBCUTANEOUS at 16:35

## 2017-03-27 RX ADMIN — IPRATROPIUM BROMIDE AND ALBUTEROL SULFATE 3 ML: .5; 3 SOLUTION RESPIRATORY (INHALATION) at 06:46

## 2017-03-27 RX ADMIN — INSULIN ASPART 10 UNITS: 100 INJECTION, SOLUTION INTRAVENOUS; SUBCUTANEOUS at 09:17

## 2017-03-27 RX ADMIN — INSULIN ASPART 8 UNITS: 100 INJECTION, SOLUTION INTRAVENOUS; SUBCUTANEOUS at 12:15

## 2017-03-27 RX ADMIN — ISOSORBIDE MONONITRATE 30 MG: 30 TABLET, EXTENDED RELEASE ORAL at 16:36

## 2017-06-08 ENCOUNTER — TRANSCRIBE ORDERS (OUTPATIENT)
Dept: PHYSICAL THERAPY | Facility: HOSPITAL | Age: 82
End: 2017-06-08

## 2017-06-08 DIAGNOSIS — I89.0 LYMPHEDEMA: Primary | ICD-10-CM

## 2017-06-22 ENCOUNTER — APPOINTMENT (OUTPATIENT)
Dept: OCCUPATIONAL THERAPY | Facility: HOSPITAL | Age: 82
End: 2017-06-22

## 2018-01-09 ENCOUNTER — HOSPITAL ENCOUNTER (INPATIENT)
Facility: HOSPITAL | Age: 83
LOS: 3 days | Discharge: NURSING, REHAB OR LONG-TERM CARE | End: 2018-01-13
Attending: FAMILY MEDICINE | Admitting: INTERNAL MEDICINE

## 2018-01-09 ENCOUNTER — APPOINTMENT (OUTPATIENT)
Dept: GENERAL RADIOLOGY | Facility: HOSPITAL | Age: 83
End: 2018-01-09

## 2018-01-09 DIAGNOSIS — R26.89 DECREASED MOBILITY: ICD-10-CM

## 2018-01-09 DIAGNOSIS — N39.0 ACUTE UTI: ICD-10-CM

## 2018-01-09 DIAGNOSIS — R07.9 CHEST PAIN, UNSPECIFIED TYPE: Primary | ICD-10-CM

## 2018-01-09 LAB
ALBUMIN SERPL-MCNC: 3.7 G/DL (ref 3.5–5.2)
ALBUMIN/GLOB SERPL: 1 G/DL
ALP SERPL-CCNC: 134 U/L (ref 39–117)
ALT SERPL W P-5'-P-CCNC: 34 U/L (ref 1–33)
ANION GAP SERPL CALCULATED.3IONS-SCNC: 15 MMOL/L
AST SERPL-CCNC: 34 U/L (ref 1–32)
BACTERIA UR QL AUTO: ABNORMAL /HPF
BASOPHILS # BLD AUTO: 0.01 10*3/MM3 (ref 0–0.2)
BASOPHILS NFR BLD AUTO: 0.1 % (ref 0–1.5)
BILIRUB SERPL-MCNC: 0.3 MG/DL (ref 0.1–1.2)
BILIRUB UR QL STRIP: NEGATIVE
BUN BLD-MCNC: 27 MG/DL (ref 8–23)
BUN/CREAT SERPL: 20.8 (ref 7–25)
CALCIUM SPEC-SCNC: 9.3 MG/DL (ref 8.6–10.5)
CHLORIDE SERPL-SCNC: 99 MMOL/L (ref 98–107)
CLARITY UR: ABNORMAL
CO2 SERPL-SCNC: 25 MMOL/L (ref 22–29)
COLOR UR: YELLOW
CREAT BLD-MCNC: 1.3 MG/DL (ref 0.57–1)
D DIMER PPP FEU-MCNC: 0.83 MCGFEU/ML (ref 0–0.49)
DEPRECATED RDW RBC AUTO: 41.6 FL (ref 37–54)
EOSINOPHIL # BLD AUTO: 0.09 10*3/MM3 (ref 0–0.7)
EOSINOPHIL NFR BLD AUTO: 1 % (ref 0.3–6.2)
ERYTHROCYTE [DISTWIDTH] IN BLOOD BY AUTOMATED COUNT: 14.3 % (ref 11.7–13)
GFR SERPL CREATININE-BSD FRML MDRD: 39 ML/MIN/1.73
GLOBULIN UR ELPH-MCNC: 3.6 GM/DL
GLUCOSE BLD-MCNC: 205 MG/DL (ref 65–99)
GLUCOSE BLDC GLUCOMTR-MCNC: 242 MG/DL (ref 70–130)
GLUCOSE UR STRIP-MCNC: NEGATIVE MG/DL
HCT VFR BLD AUTO: 35.4 % (ref 35.6–45.5)
HGB BLD-MCNC: 10.5 G/DL (ref 11.9–15.5)
HGB UR QL STRIP.AUTO: NEGATIVE
HOLD SPECIMEN: NORMAL
HYALINE CASTS UR QL AUTO: ABNORMAL /LPF
IMM GRANULOCYTES # BLD: 0.05 10*3/MM3 (ref 0–0.03)
IMM GRANULOCYTES NFR BLD: 0.5 % (ref 0–0.5)
INR PPP: 1.67 (ref 0.9–1.1)
KETONES UR QL STRIP: NEGATIVE
LEUKOCYTE ESTERASE UR QL STRIP.AUTO: ABNORMAL
LYMPHOCYTES # BLD AUTO: 1.62 10*3/MM3 (ref 0.9–4.8)
LYMPHOCYTES NFR BLD AUTO: 17.2 % (ref 19.6–45.3)
MAGNESIUM SERPL-MCNC: 2 MG/DL (ref 1.6–2.4)
MCH RBC QN AUTO: 24.1 PG (ref 26.9–32)
MCHC RBC AUTO-ENTMCNC: 29.7 G/DL (ref 32.4–36.3)
MCV RBC AUTO: 81.2 FL (ref 80.5–98.2)
MONOCYTES # BLD AUTO: 0.46 10*3/MM3 (ref 0.2–1.2)
MONOCYTES NFR BLD AUTO: 4.9 % (ref 5–12)
NEUTROPHILS # BLD AUTO: 7.21 10*3/MM3 (ref 1.9–8.1)
NEUTROPHILS NFR BLD AUTO: 76.3 % (ref 42.7–76)
NITRITE UR QL STRIP: NEGATIVE
NT-PROBNP SERPL-MCNC: 2344 PG/ML (ref 0–1800)
PH UR STRIP.AUTO: <=5 [PH] (ref 5–8)
PLATELET # BLD AUTO: 313 10*3/MM3 (ref 140–500)
PMV BLD AUTO: 10.9 FL (ref 6–12)
POTASSIUM BLD-SCNC: 4.2 MMOL/L (ref 3.5–5.2)
PROT SERPL-MCNC: 7.3 G/DL (ref 6–8.5)
PROT UR QL STRIP: NEGATIVE
PROTHROMBIN TIME: 19.1 SECONDS (ref 11.7–14.2)
RBC # BLD AUTO: 4.36 10*6/MM3 (ref 3.9–5.2)
RBC # UR: ABNORMAL /HPF
REF LAB TEST METHOD: ABNORMAL
SODIUM BLD-SCNC: 139 MMOL/L (ref 136–145)
SP GR UR STRIP: 1.01 (ref 1–1.03)
SQUAMOUS #/AREA URNS HPF: ABNORMAL /HPF
TROPONIN T SERPL-MCNC: 0.02 NG/ML (ref 0–0.03)
UROBILINOGEN UR QL STRIP: ABNORMAL
WBC NRBC COR # BLD: 9.44 10*3/MM3 (ref 4.5–10.7)
WBC UR QL AUTO: ABNORMAL /HPF
WHOLE BLOOD HOLD SPECIMEN: NORMAL

## 2018-01-09 PROCEDURE — 71046 X-RAY EXAM CHEST 2 VIEWS: CPT

## 2018-01-09 PROCEDURE — 84484 ASSAY OF TROPONIN QUANT: CPT | Performed by: FAMILY MEDICINE

## 2018-01-09 PROCEDURE — 80053 COMPREHEN METABOLIC PANEL: CPT | Performed by: FAMILY MEDICINE

## 2018-01-09 PROCEDURE — G0378 HOSPITAL OBSERVATION PER HR: HCPCS

## 2018-01-09 PROCEDURE — 93010 ELECTROCARDIOGRAM REPORT: CPT | Performed by: INTERNAL MEDICINE

## 2018-01-09 PROCEDURE — 85610 PROTHROMBIN TIME: CPT | Performed by: FAMILY MEDICINE

## 2018-01-09 PROCEDURE — 36415 COLL VENOUS BLD VENIPUNCTURE: CPT | Performed by: FAMILY MEDICINE

## 2018-01-09 PROCEDURE — 85379 FIBRIN DEGRADATION QUANT: CPT | Performed by: FAMILY MEDICINE

## 2018-01-09 PROCEDURE — 99285 EMERGENCY DEPT VISIT HI MDM: CPT

## 2018-01-09 PROCEDURE — 83880 ASSAY OF NATRIURETIC PEPTIDE: CPT | Performed by: FAMILY MEDICINE

## 2018-01-09 PROCEDURE — 83735 ASSAY OF MAGNESIUM: CPT | Performed by: FAMILY MEDICINE

## 2018-01-09 PROCEDURE — 81001 URINALYSIS AUTO W/SCOPE: CPT | Performed by: FAMILY MEDICINE

## 2018-01-09 PROCEDURE — 82962 GLUCOSE BLOOD TEST: CPT

## 2018-01-09 PROCEDURE — 85025 COMPLETE CBC W/AUTO DIFF WBC: CPT | Performed by: FAMILY MEDICINE

## 2018-01-09 PROCEDURE — 93005 ELECTROCARDIOGRAM TRACING: CPT | Performed by: FAMILY MEDICINE

## 2018-01-09 PROCEDURE — 87186 SC STD MICRODIL/AGAR DIL: CPT | Performed by: FAMILY MEDICINE

## 2018-01-09 PROCEDURE — 87086 URINE CULTURE/COLONY COUNT: CPT | Performed by: FAMILY MEDICINE

## 2018-01-09 RX ORDER — SODIUM CHLORIDE 0.9 % (FLUSH) 0.9 %
10 SYRINGE (ML) INJECTION AS NEEDED
Status: DISCONTINUED | OUTPATIENT
Start: 2018-01-09 | End: 2018-01-13 | Stop reason: HOSPADM

## 2018-01-09 RX ORDER — ASPIRIN 325 MG
325 TABLET ORAL ONCE
Status: COMPLETED | OUTPATIENT
Start: 2018-01-09 | End: 2018-01-09

## 2018-01-09 RX ORDER — LABETALOL 100 MG/1
100 TABLET, FILM COATED ORAL 2 TIMES DAILY
COMMUNITY
End: 2018-12-05 | Stop reason: HOSPADM

## 2018-01-09 RX ORDER — HYDROXYZINE HYDROCHLORIDE 25 MG/1
25 TABLET, FILM COATED ORAL EVERY 6 HOURS PRN
COMMUNITY
End: 2018-01-13 | Stop reason: HOSPADM

## 2018-01-09 RX ORDER — HYDROCODONE BITARTRATE AND ACETAMINOPHEN 5; 325 MG/1; MG/1
1 TABLET ORAL EVERY 6 HOURS PRN
Status: ON HOLD | COMMUNITY
End: 2018-01-13

## 2018-01-09 RX ORDER — SODIUM CHLORIDE 9 MG/ML
125 INJECTION, SOLUTION INTRAVENOUS CONTINUOUS
Status: DISCONTINUED | OUTPATIENT
Start: 2018-01-09 | End: 2018-01-11

## 2018-01-09 RX ORDER — WHEY PROTEIN ISOLATE 6 G-25/7 G
1 POWDER (GRAM) ORAL
COMMUNITY
End: 2018-01-13 | Stop reason: HOSPADM

## 2018-01-09 RX ORDER — MELATONIN
1000 DAILY
COMMUNITY

## 2018-01-09 RX ADMIN — ASPIRIN 325 MG: 325 TABLET ORAL at 22:54

## 2018-01-09 RX ADMIN — SODIUM CHLORIDE 125 ML/HR: 9 INJECTION, SOLUTION INTRAVENOUS at 23:30

## 2018-01-09 RX ADMIN — NITROGLYCERIN 1 INCH: 20 OINTMENT TOPICAL at 22:54

## 2018-01-09 RX ADMIN — SODIUM CHLORIDE 250 ML: 0.9 INJECTION, SOLUTION INTRAVENOUS at 23:30

## 2018-01-09 NOTE — ED NOTES
Nursing facility ([facility]) called EMS for AMS. Upon EMS arrival, pt had received 40 mg Lasix per PICC line and at least one SL nitro. Per EMS, pt's blood glucose is 339.      Violette Del Valle RN  01/09/18 5581

## 2018-01-10 ENCOUNTER — APPOINTMENT (OUTPATIENT)
Dept: CT IMAGING | Facility: HOSPITAL | Age: 83
End: 2018-01-10

## 2018-01-10 PROBLEM — I25.10 CORONARY ARTERY DISEASE: Chronic | Status: ACTIVE | Noted: 2018-01-10

## 2018-01-10 PROBLEM — F20.0 PARANOID SCHIZOPHRENIA (HCC): Chronic | Status: ACTIVE | Noted: 2018-01-10

## 2018-01-10 PROBLEM — I82.403 ACUTE DEEP VEIN THROMBOSIS (DVT) OF BOTH LOWER EXTREMITIES (HCC): Status: ACTIVE | Noted: 2018-01-10

## 2018-01-10 PROBLEM — N18.9 CHRONIC KIDNEY DISEASE: Chronic | Status: ACTIVE | Noted: 2018-01-10

## 2018-01-10 PROBLEM — I89.0 LYMPHEDEMA: Chronic | Status: ACTIVE | Noted: 2018-01-10

## 2018-01-10 PROBLEM — E11.9 DIABETES MELLITUS (HCC): Status: ACTIVE | Noted: 2017-02-13

## 2018-01-10 LAB
ANION GAP SERPL CALCULATED.3IONS-SCNC: 13.5 MMOL/L
BUN BLD-MCNC: 27 MG/DL (ref 8–23)
BUN/CREAT SERPL: 23.3 (ref 7–25)
CALCIUM SPEC-SCNC: 8.3 MG/DL (ref 8.6–10.5)
CHLORIDE SERPL-SCNC: 102 MMOL/L (ref 98–107)
CO2 SERPL-SCNC: 23.5 MMOL/L (ref 22–29)
CREAT BLD-MCNC: 1.16 MG/DL (ref 0.57–1)
DEPRECATED RDW RBC AUTO: 40.5 FL (ref 37–54)
ERYTHROCYTE [DISTWIDTH] IN BLOOD BY AUTOMATED COUNT: 14.1 % (ref 11.7–13)
GFR SERPL CREATININE-BSD FRML MDRD: 45 ML/MIN/1.73
GLUCOSE BLD-MCNC: 120 MG/DL (ref 65–99)
GLUCOSE BLDC GLUCOMTR-MCNC: 141 MG/DL (ref 70–130)
GLUCOSE BLDC GLUCOMTR-MCNC: 144 MG/DL (ref 70–130)
GLUCOSE BLDC GLUCOMTR-MCNC: 156 MG/DL (ref 70–130)
HCT VFR BLD AUTO: 28.3 % (ref 35.6–45.5)
HGB BLD-MCNC: 8.6 G/DL (ref 11.9–15.5)
MCH RBC QN AUTO: 24.2 PG (ref 26.9–32)
MCHC RBC AUTO-ENTMCNC: 30.4 G/DL (ref 32.4–36.3)
MCV RBC AUTO: 79.5 FL (ref 80.5–98.2)
PLATELET # BLD AUTO: 273 10*3/MM3 (ref 140–500)
PMV BLD AUTO: 10.7 FL (ref 6–12)
POTASSIUM BLD-SCNC: 3.6 MMOL/L (ref 3.5–5.2)
RBC # BLD AUTO: 3.56 10*6/MM3 (ref 3.9–5.2)
SODIUM BLD-SCNC: 139 MMOL/L (ref 136–145)
TROPONIN T SERPL-MCNC: 0.02 NG/ML (ref 0–0.03)
WBC NRBC COR # BLD: 8.21 10*3/MM3 (ref 4.5–10.7)

## 2018-01-10 PROCEDURE — 85027 COMPLETE CBC AUTOMATED: CPT | Performed by: INTERNAL MEDICINE

## 2018-01-10 PROCEDURE — 84484 ASSAY OF TROPONIN QUANT: CPT | Performed by: INTERNAL MEDICINE

## 2018-01-10 PROCEDURE — 25010000002 CEFTRIAXONE IN SWFI 1 GRAM/10ML IV PUSH SYRINGE (SIMPLE): Performed by: FAMILY MEDICINE

## 2018-01-10 PROCEDURE — 25010000002 CEFTRIAXONE PER 250 MG: Performed by: INTERNAL MEDICINE

## 2018-01-10 PROCEDURE — 63710000001 INSULIN DETEMER PER 5 UNITS: Performed by: INTERNAL MEDICINE

## 2018-01-10 PROCEDURE — 71275 CT ANGIOGRAPHY CHEST: CPT

## 2018-01-10 PROCEDURE — 82962 GLUCOSE BLOOD TEST: CPT

## 2018-01-10 PROCEDURE — 80048 BASIC METABOLIC PNL TOTAL CA: CPT | Performed by: INTERNAL MEDICINE

## 2018-01-10 PROCEDURE — 97162 PT EVAL MOD COMPLEX 30 MIN: CPT

## 2018-01-10 PROCEDURE — 0 IOPAMIDOL PER 1 ML: Performed by: INTERNAL MEDICINE

## 2018-01-10 RX ORDER — RISPERIDONE 0.5 MG/1
1 TABLET, ORALLY DISINTEGRATING ORAL NIGHTLY
Status: DISCONTINUED | OUTPATIENT
Start: 2018-01-10 | End: 2018-01-13 | Stop reason: HOSPADM

## 2018-01-10 RX ORDER — ACETAMINOPHEN 325 MG/1
650 TABLET ORAL EVERY 4 HOURS PRN
Status: DISCONTINUED | OUTPATIENT
Start: 2018-01-10 | End: 2018-01-13 | Stop reason: HOSPADM

## 2018-01-10 RX ORDER — HYDROCODONE BITARTRATE AND ACETAMINOPHEN 5; 325 MG/1; MG/1
1 TABLET ORAL EVERY 6 HOURS PRN
Status: DISCONTINUED | OUTPATIENT
Start: 2018-01-10 | End: 2018-01-13 | Stop reason: HOSPADM

## 2018-01-10 RX ORDER — IPRATROPIUM BROMIDE AND ALBUTEROL SULFATE 2.5; .5 MG/3ML; MG/3ML
3 SOLUTION RESPIRATORY (INHALATION)
Status: DISCONTINUED | OUTPATIENT
Start: 2018-01-10 | End: 2018-01-13 | Stop reason: HOSPADM

## 2018-01-10 RX ORDER — POTASSIUM CHLORIDE 750 MG/1
40 CAPSULE, EXTENDED RELEASE ORAL DAILY
Status: DISCONTINUED | OUTPATIENT
Start: 2018-01-10 | End: 2018-01-13 | Stop reason: HOSPADM

## 2018-01-10 RX ORDER — DEXTROSE MONOHYDRATE 25 G/50ML
25 INJECTION, SOLUTION INTRAVENOUS
Status: DISCONTINUED | OUTPATIENT
Start: 2018-01-10 | End: 2018-01-13 | Stop reason: HOSPADM

## 2018-01-10 RX ORDER — AMMONIUM LACTATE 120 MG/G
CREAM TOPICAL 2 TIMES DAILY
Status: DISCONTINUED | OUTPATIENT
Start: 2018-01-10 | End: 2018-01-13 | Stop reason: HOSPADM

## 2018-01-10 RX ORDER — HYDRALAZINE HYDROCHLORIDE 50 MG/1
50 TABLET, FILM COATED ORAL EVERY 8 HOURS SCHEDULED
Status: DISCONTINUED | OUTPATIENT
Start: 2018-01-10 | End: 2018-01-13 | Stop reason: HOSPADM

## 2018-01-10 RX ORDER — FUROSEMIDE 40 MG/1
40 TABLET ORAL 3 TIMES DAILY
Status: DISCONTINUED | OUTPATIENT
Start: 2018-01-10 | End: 2018-01-13 | Stop reason: HOSPADM

## 2018-01-10 RX ORDER — ATORVASTATIN CALCIUM 20 MG/1
40 TABLET, FILM COATED ORAL DAILY
Status: DISCONTINUED | OUTPATIENT
Start: 2018-01-10 | End: 2018-01-13 | Stop reason: HOSPADM

## 2018-01-10 RX ORDER — LABETALOL 100 MG/1
100 TABLET, FILM COATED ORAL EVERY 12 HOURS SCHEDULED
Status: DISCONTINUED | OUTPATIENT
Start: 2018-01-10 | End: 2018-01-13 | Stop reason: HOSPADM

## 2018-01-10 RX ORDER — NITROGLYCERIN 0.4 MG/1
0.4 TABLET SUBLINGUAL
Status: DISCONTINUED | OUTPATIENT
Start: 2018-01-10 | End: 2018-01-13 | Stop reason: HOSPADM

## 2018-01-10 RX ORDER — NICOTINE POLACRILEX 4 MG
15 LOZENGE BUCCAL
Status: DISCONTINUED | OUTPATIENT
Start: 2018-01-10 | End: 2018-01-13 | Stop reason: HOSPADM

## 2018-01-10 RX ORDER — LISINOPRIL 40 MG/1
40 TABLET ORAL DAILY
Status: DISCONTINUED | OUTPATIENT
Start: 2018-01-10 | End: 2018-01-13 | Stop reason: HOSPADM

## 2018-01-10 RX ORDER — MULTIPLE VITAMINS W/ MINERALS TAB 9MG-400MCG
1 TAB ORAL DAILY
Status: DISCONTINUED | OUTPATIENT
Start: 2018-01-10 | End: 2018-01-13 | Stop reason: HOSPADM

## 2018-01-10 RX ORDER — ISOSORBIDE MONONITRATE 30 MG/1
30 TABLET, EXTENDED RELEASE ORAL
Status: DISCONTINUED | OUTPATIENT
Start: 2018-01-10 | End: 2018-01-13 | Stop reason: HOSPADM

## 2018-01-10 RX ADMIN — MULTIPLE VITAMINS W/ MINERALS TAB 1 TABLET: TAB at 10:42

## 2018-01-10 RX ADMIN — CEFTRIAXONE 1 G: 1 INJECTION, POWDER, FOR SOLUTION INTRAMUSCULAR; INTRAVENOUS at 22:05

## 2018-01-10 RX ADMIN — APIXABAN 5 MG: 5 TABLET, FILM COATED ORAL at 10:41

## 2018-01-10 RX ADMIN — HYDRALAZINE HYDROCHLORIDE 50 MG: 50 TABLET, FILM COATED ORAL at 06:51

## 2018-01-10 RX ADMIN — FUROSEMIDE 40 MG: 40 TABLET ORAL at 10:41

## 2018-01-10 RX ADMIN — SODIUM CHLORIDE 125 ML/HR: 9 INJECTION, SOLUTION INTRAVENOUS at 06:51

## 2018-01-10 RX ADMIN — LABETALOL HCL 100 MG: 100 TABLET, FILM COATED ORAL at 22:05

## 2018-01-10 RX ADMIN — ISOSORBIDE MONONITRATE 30 MG: 30 TABLET ORAL at 10:41

## 2018-01-10 RX ADMIN — CEFTRIAXONE SODIUM 1 G: 1 INJECTION, POWDER, FOR SOLUTION INTRAMUSCULAR; INTRAVENOUS at 02:38

## 2018-01-10 RX ADMIN — ATORVASTATIN CALCIUM 40 MG: 20 TABLET, FILM COATED ORAL at 10:41

## 2018-01-10 RX ADMIN — LABETALOL HCL 100 MG: 100 TABLET, FILM COATED ORAL at 10:42

## 2018-01-10 RX ADMIN — AMMONIUM LACTATE: 120 CREAM TOPICAL at 22:04

## 2018-01-10 RX ADMIN — SODIUM CHLORIDE 125 ML/HR: 9 INJECTION, SOLUTION INTRAVENOUS at 22:13

## 2018-01-10 RX ADMIN — SODIUM CHLORIDE 125 ML/HR: 9 INJECTION, SOLUTION INTRAVENOUS at 14:31

## 2018-01-10 RX ADMIN — LABETALOL HCL 100 MG: 100 TABLET, FILM COATED ORAL at 02:38

## 2018-01-10 RX ADMIN — IOPAMIDOL 95 ML: 755 INJECTION, SOLUTION INTRAVENOUS at 01:40

## 2018-01-10 RX ADMIN — LISINOPRIL 40 MG: 40 TABLET ORAL at 10:42

## 2018-01-10 RX ADMIN — HYDRALAZINE HYDROCHLORIDE 50 MG: 50 TABLET, FILM COATED ORAL at 22:05

## 2018-01-10 RX ADMIN — FUROSEMIDE 40 MG: 40 TABLET ORAL at 17:48

## 2018-01-10 RX ADMIN — POTASSIUM CHLORIDE 40 MEQ: 750 CAPSULE, EXTENDED RELEASE ORAL at 10:41

## 2018-01-10 RX ADMIN — FUROSEMIDE 40 MG: 40 TABLET ORAL at 22:05

## 2018-01-10 RX ADMIN — HYDRALAZINE HYDROCHLORIDE 50 MG: 50 TABLET, FILM COATED ORAL at 13:47

## 2018-01-10 RX ADMIN — APIXABAN 5 MG: 5 TABLET, FILM COATED ORAL at 22:05

## 2018-01-10 NOTE — ED PROVIDER NOTES
EMERGENCY DEPARTMENT ENCOUNTER    CHIEF COMPLAINT  Chief Complaint: Altered Mental Status  History given by: Pt  History limited by: Dementia  Room Number: 623/1  PMD: Gustavo Mcgrath MD      HPI:  Pt is a 82 y.o. female who presents from NH via ambulance complaining of altered mental status.  Pt received nitro and lasix. Pt states that she had SOB, and near syncope that lasted about 15 minutes long.  Pt denies LOC.  Pt states that she had history of blood clots each leg.     Duration:  PTA  Onset: Sudden  Timing: Episodic  Intensity/Severity: Moderate  Progression: Improved  Associated Symptoms: Pt states that she had SOB, and near syncope  Aggravating Factors: None  Alleviating Factors: None  Previous Episodes: None reported  Treatment before arrival:  Pt received nitro and lasix.    PAST MEDICAL HISTORY  Active Ambulatory Problems     Diagnosis Date Noted   • NSTEMI (non-ST elevated myocardial infarction) 02/13/2017   • Type II diabetes mellitus, uncontrolled 02/13/2017   • Hyperlipidemia 02/13/2017   • HTN (hypertension) 02/13/2017   • Dementia 02/13/2017   • Schizoaffective disorder 02/13/2017   • Chronic renal insufficiency, stage III (moderate) 02/14/2017   • Chronic diastolic (congestive) heart failure 03/25/2017   • Lymphedema of both lower extremities 03/25/2017     Resolved Ambulatory Problems     Diagnosis Date Noted   • No Resolved Ambulatory Problems     Past Medical History:   Diagnosis Date   • Angina pectoris    • Asthma    • Atherosclerosis    • CHF (congestive heart failure)    • Coronary artery disease    • Dementia    • Depression    • Diabetes mellitus    • GERD (gastroesophageal reflux disease)    • Hyperlipidemia    • Hypertension    • Stroke    • TIA (transient ischemic attack)        PAST SURGICAL HISTORY  History reviewed. No pertinent surgical history.    FAMILY HISTORY  History reviewed. No pertinent family history.    SOCIAL HISTORY  Social History     Social History   • Marital  status: Single     Spouse name: N/A   • Number of children: N/A   • Years of education: N/A     Occupational History   • Not on file.     Social History Main Topics   • Smoking status: Former Smoker   • Smokeless tobacco: Not on file   • Alcohol use No   • Drug use: No   • Sexual activity: Defer     Other Topics Concern   • Not on file     Social History Narrative   • No narrative on file       ALLERGIES  Methyldopa and Sulfa antibiotics    REVIEW OF SYSTEMS  Review of Systems   Constitutional: Negative for fever.   HENT: Negative for sore throat.    Eyes: Negative.    Respiratory: Positive for shortness of breath. Negative for cough.    Cardiovascular: Negative for chest pain.   Gastrointestinal: Negative for abdominal pain, diarrhea and vomiting.   Genitourinary: Negative for dysuria.   Musculoskeletal: Negative for neck pain.   Skin: Negative for rash.   Allergic/Immunologic: Negative.    Neurological: Negative for weakness, numbness and headaches.        Near syncope   Hematological: Negative.    Psychiatric/Behavioral: Negative.    All other systems reviewed and are negative.      PHYSICAL EXAM  ED Triage Vitals   Temp Heart Rate Resp BP SpO2   01/09/18 1418 01/09/18 1418 01/09/18 1418 01/09/18 1418 01/09/18 1418   98.2 °F (36.8 °C) 72 16 89/44 99 %      Temp src Heart Rate Source Patient Position BP Location FiO2 (%)   01/09/18 1418 01/09/18 1418 01/09/18 1425 01/09/18 1425 --   Tympanic Monitor Lying Left arm        Physical Exam   Constitutional: She is oriented to person, place, and time and well-developed, well-nourished, and in no distress. No distress.   HENT:   Head: Normocephalic and atraumatic.   Eyes: EOM are normal. Pupils are equal, round, and reactive to light.   Neck: Normal range of motion. Neck supple.   Cardiovascular: Normal rate, regular rhythm and normal heart sounds.    Pulmonary/Chest: Effort normal and breath sounds normal. No respiratory distress.   Abdominal: Soft. There is no  tenderness. There is no rebound and no guarding.   Musculoskeletal: Normal range of motion. She exhibits no edema.   Tender calf with mild swelling on left leg.  Pt has lymphedema and marked scaling on left calf with a closed pressure sore at left heel.  Mild swelling of left ankle.    Neurological: She is alert and oriented to person, place, and time. She has normal sensation and normal strength.   Skin: Skin is warm and dry. No rash noted.   Psychiatric: Mood and affect normal.   Nursing note and vitals reviewed.      LAB RESULTS  Lab Results (last 24 hours)     Procedure Component Value Units Date/Time    POC Glucose Once [[id number]]  (Abnormal) Collected:  01/09/18 1429    Specimen:  Blood Updated:  01/09/18 1430     Glucose 242 (H) mg/dL     Narrative:       Meter: [id number] : [id number] Janice Oakes    CBC & Differential [[id number]] Collected:  01/09/18 1828    Specimen:  Blood Updated:  01/09/18 1913    Narrative:       The following orders were created for panel order CBC & Differential.  Procedure                               Abnormality         Status                     ---------                               -----------         ------                     CBC Auto Differential[[id number]]        Abnormal            Final result                 Please view results for these tests on the individual orders.    Comprehensive Metabolic Panel [[id number]]  (Abnormal) Collected:  01/09/18 1828    Specimen:  Blood Updated:  01/09/18 1925     Glucose 205 (H) mg/dL      BUN 27 (H) mg/dL      Creatinine 1.30 (H) mg/dL      Sodium 139 mmol/L      Potassium 4.2 mmol/L      Chloride 99 mmol/L      CO2 25.0 mmol/L      Calcium 9.3 mg/dL      Total Protein 7.3 g/dL      Albumin 3.70 g/dL      ALT (SGPT) 34 (H) U/L      AST (SGOT) 34 (H) U/L      Alkaline Phosphatase 134 (H) U/L      Total Bilirubin 0.3 mg/dL      eGFR Non African Amer 39 (L) mL/min/1.73      Globulin 3.6 gm/dL      A/G Ratio 1.0 g/dL       BUN/Creatinine Ratio 20.8     Anion Gap 15.0 mmol/L     Narrative:       The MDRD GFR formula is only valid for adults with stable renal function between ages 18 and 70.    Troponin [[id number]]  (Normal) Collected:  01/09/18 1828    Specimen:  Blood Updated:  01/09/18 1928     Troponin T 0.022 ng/mL     Narrative:       Troponin T Reference Ranges:  Less than 0.03 ng/mL:    Negative for AMI  0.03 to 0.09 ng/mL:      Indeterminant for AMI  Greater than 0.09 ng/mL: Positive for AMI    Magnesium [[id number]]  (Normal) Collected:  01/09/18 1828    Specimen:  Blood Updated:  01/09/18 1925     Magnesium 2.0 mg/dL     BNP [[id number]]  (Abnormal) Collected:  01/09/18 1828    Specimen:  Blood Updated:  01/09/18 1928     proBNP 2344.0 (H) pg/mL     Narrative:       Among patients with dyspnea, NT-proBNP is highly sensitive for the detection of acute congestive heart failure. In addition NT-proBNP of <300 pg/ml effectively rules out acute congestive heart failure with 99% negative predictive value.    CBC Auto Differential [[id number]]  (Abnormal) Collected:  01/09/18 1828    Specimen:  Blood Updated:  01/09/18 1913     WBC 9.44 10*3/mm3      RBC 4.36 10*6/mm3      Hemoglobin 10.5 (L) g/dL      Hematocrit 35.4 (L) %      MCV 81.2 fL      MCH 24.1 (L) pg      MCHC 29.7 (L) g/dL      RDW 14.3 (H) %      RDW-SD 41.6 fl      MPV 10.9 fL      Platelets 313 10*3/mm3      Neutrophil % 76.3 (H) %      Lymphocyte % 17.2 (L) %      Monocyte % 4.9 (L) %      Eosinophil % 1.0 %      Basophil % 0.1 %      Immature Grans % 0.5 %      Neutrophils, Absolute 7.21 10*3/mm3      Lymphocytes, Absolute 1.62 10*3/mm3      Monocytes, Absolute 0.46 10*3/mm3      Eosinophils, Absolute 0.09 10*3/mm3      Basophils, Absolute 0.01 10*3/mm3      Immature Grans, Absolute 0.05 (H) 10*3/mm3     D-dimer, Quantitative [[id number]]  (Abnormal) Collected:  01/09/18 9057    Specimen:  Blood Updated:  01/09/18 7603     D-Dimer, Quantitative 0.83 (H) MCGFEU/mL      Narrative:       The Stago D-Dimer test used in conjunction with a clinical pretest probability (PTP) assessment model, has been approved by the FDA to rule out the presence of venous thromboembolism (VTE) in outpatients suspected of deep venous thrombosis (DVT) or pulmonary embolism (PE).     Protime-INR [[id number]]  (Abnormal) Collected:  01/09/18 2257    Specimen:  Blood Updated:  01/09/18 2343     Protime 19.1 (H) Seconds      INR 1.67 (H)    Urinalysis With / Culture If Indicated - Urine, Clean Catch [[id number]]  (Abnormal) Collected:  01/09/18 2310    Specimen:  Urine from Urine, Catheter Updated:  01/09/18 2333     Color, UA Yellow     Appearance, UA Cloudy (A)     pH, UA <=5.0     Specific Gravity, UA 1.011     Glucose, UA Negative     Ketones, UA Negative     Bilirubin, UA Negative     Blood, UA Negative     Protein, UA Negative     Leuk Esterase, UA Large (3+) (A)     Nitrite, UA Negative     Urobilinogen, UA 0.2 E.U./dL    Urinalysis, Microscopic Only - Urine, Clean Catch [[id number]]  (Abnormal) Collected:  01/09/18 2310    Specimen:  Urine from Urine, Catheter Updated:  01/09/18 2333     RBC, UA 0-2 /HPF      WBC, UA Too Numerous to Count (A) /HPF      Bacteria, UA 4+ (A) /HPF      Squamous Epithelial Cells, UA 0-2 /HPF      Hyaline Casts, UA 0-2 /LPF      Methodology Automated Microscopy    Urine Culture - Urine, Urine, Clean Catch [[id number]] Collected:  01/09/18 2310    Specimen:  Urine from Urine, Catheter Updated:  01/09/18 2330          I ordered the above labs and reviewed the results    RADIOLOGY  XR Chest 2 View   Final Result       1. No active disease is seen in the chest. There is a right arm PICC   line in place, distal tip projects inferior to the right 2nd posterior   rib in the expected location of the right subclavian vein. There are   clips in the right upper quadrant from previous cholecystectomy.       This report was finalized on 1/9/2018 8:29 PM by Dr. Tai Velasquez MD.          CT  Angiogram Chest With Contrast    (Results Pending)        I ordered the above noted radiological studies. Reviewed by me in PACS.       PROCEDURES  Procedures    EKG           EKG time: 2100  Rhythm/Rate: Sonus, 70  P waves and AK: Normal  QRS, axis: NICD   ST and T waves: Non specific ST and T wave changes     Interpreted Contemporaneously by me, independently viewed  QRS duration is prolonged 2/ 2017       PROGRESS AND CONSULTS  ED Course     2241  Ordered Chest XR and D-dimer for further evaluation.      2054  Spoke with Dr. Mcgrath who states that Pt is on Eliquis but she may not be compliant. With history of DVTs she will merit a CTA even though she has renal insufficiency.  Will hydrate Pt prior to CTA.      MEDICAL DECISION MAKING  Results were reviewed/discussed with the patient and they were also made aware of online access. Pt also made aware that some labs, such as cultures, will not be resulted during ER visit and follow up with PMD is necessary.     MDM       DIAGNOSIS  Final diagnoses:   Chest pain, unspecified type   Acute UTI       DISPOSITION  ADMISSION    Discussed treatment plan and reason for admission with pt/family and admitting physician.  Pt/family voiced understanding of the plan for admission for further testing/treatment as needed.         Latest Documented Vital Signs:  As of 11:49 PM  BP- (!) 193/73 HR- 70 Temp- 97.2 °F (36.2 °C) O2 sat- 97%    --  Documentation assistance provided by amgaly Valadez for Dr. Sutherland.  Information recorded by the scribe was done at my direction and has been verified and validated by me.          Tiara Valadez  01/09/18 5121       Kun Sutherland MD  01/09/18 5321

## 2018-01-10 NOTE — PLAN OF CARE
Problem: Patient Care Overview (Adult)  Goal: Plan of Care Review  Outcome: Ongoing (interventions implemented as appropriate)   01/10/18 0455   Coping/Psychosocial Response Interventions   Plan Of Care Reviewed With patient   Patient Care Overview   Progress no change   Outcome Evaluation   Outcome Summary/Follow up Plan New admit tonight. Pt resting well in room. Pt reports she had fleas prior to admit and has been using special shampoo to remove. BP elevated, but otherwise VSS. Skin dry and flaky on LLE. Monitor labs, vitals.     Goal: Discharge Needs Assessment  Outcome: Ongoing (interventions implemented as appropriate)      Problem: Pain, Acute (Adult)  Goal: Identify Related Risk Factors and Signs and Symptoms  Outcome: Ongoing (interventions implemented as appropriate)    Goal: Acceptable Pain Control/Comfort Level  Outcome: Ongoing (interventions implemented as appropriate)      Problem: Fall Risk (Adult)  Goal: Identify Related Risk Factors and Signs and Symptoms  Outcome: Ongoing (interventions implemented as appropriate)    Goal: Absence of Falls  Outcome: Ongoing (interventions implemented as appropriate)      Problem: Cardiac Output, Decreased (Adult)  Goal: Identify Related Risk Factors and Signs and Symptoms  Outcome: Ongoing (interventions implemented as appropriate)    Goal: Adequate Cardiac Output/Effective Tissue Perfusion  Outcome: Ongoing (interventions implemented as appropriate)

## 2018-01-10 NOTE — NURSING NOTE
CALLED TO EVALUATE PICC  Poly Per Q Cath single lumen is present in the right upper arm .Placed prior to arrival at another facility.  Dressing is dry and intact with dried blood around BIOPATCH dated 1/6. . Dressing and site care see flow sheet.  XR shows PICC terminate in subclavian  Does not go to SVC.   Unable to determine if PICC is power injectable .  Patient is to have power injected contrast therefore a second IV site obtained in the left A/C.

## 2018-01-10 NOTE — DISCHARGE PLACEMENT REQUEST
"Britney Deras (82 y.o. Female)     Date of Birth Social Security Number Address Home Phone MRN    1935  [id number] [facility] [id number] [phone] [id number]    [employer] Marital Status          Religion Single       Admission Date Admission Type Admitting Provider Attending Provider Department, Room/Bed    1/9/18 Emergency Gustavo Mcgrath MD [place], Marlon R, MD [address], 623/1    Discharge Date Discharge Disposition Discharge Destination                      Attending Provider: Gustavo Mcgrath MD     Allergies:  Methyldopa, Sulfa Antibiotics    Isolation:  None   Infection:  None   Code Status:  Prior    Ht:  154.9 cm (61\")   Wt:  93.2 kg (205 lb 8 oz)    Admission Cmt:  None   Principal Problem:  None                Active Insurance as of 1/9/2018     Primary Coverage     Payor Plan Insurance Group Employer/Plan Group    MEDICARE MEDICARE A & B      Payor Plan Address Payor Plan Phone Number Effective From Effective To    [address] [phone] 3/1/1989     [place] [address]       Subscriber Name Subscriber Birth Date Member ID       BRITNEY DERAS 1935 [id number]           Secondary Coverage     Payor Plan Insurance Group Employer/Plan Group    [place]      Payor Plan Address Payor Plan Phone Number Effective From Effective To    [address] [phone] 2/13/2017     [place] [id number]       Subscriber Name Subscriber Birth Date Member ID       BRITNEY DERAS 1935 [id number]                 Emergency Contacts      (Rel.) Home Phone Work Phone Mobile Phone    Mirela Kramer (Guardian) -- [phone] --              "

## 2018-01-10 NOTE — PROGRESS NOTES
Clinical Pharmacy Services: Medication History    Britney Deras is a 82 y.o. female presenting to [facility] for   Chief Complaint   Patient presents with   • Altered Mental Status   • Shortness of Breath       She  has a past medical history of Angina pectoris; Asthma; Atherosclerosis; CHF (congestive heart failure); Coronary artery disease; Dementia; Depression; Diabetes mellitus; GERD (gastroesophageal reflux disease); Hyperlipidemia; Hypertension; Stroke; and TIA (transient ischemic attack).    Allergies as of 01/09/2018 - Riky as Reviewed 01/09/2018   Allergen Reaction Noted   • Methyldopa  01/09/2018   • Sulfa antibiotics  01/09/2018       Medication information was obtained from: Nursing home  Pharmacy and Phone Number:     Prior to Admission Medications     Prescriptions Last Dose Informant Patient Reported? Taking?    acetaminophen (TYLENOL) 325 MG tablet  Nursing Home Yes Yes    Take 650 mg by mouth Every 4 (Four) Hours As Needed for mild pain (1-3).    apixaban (ELIQUIS) 5 MG tablet tablet  Nursing Home Yes Yes    Take 5 mg by mouth 2 (Two) Times a Day.    aspirin 325 MG tablet  Nursing Home Yes Yes    Take 325 mg by mouth Daily.    atorvastatin (LIPITOR) 40 MG tablet  Nursing Home No Yes    Take 1 tablet by mouth Daily.    beneprotein powder  Nursing Home Yes Yes    Take 1 g by mouth 2 (Two) Times a Day.    cholecalciferol (VITAMIN D3) 1000 units tablet  Nursing Home Yes Yes    Take 1,000 Units by mouth Daily.    CloNIDine (CATAPRES-TTS) 0.3 MG/24HR patch  Nursing Home Yes Yes    Place 1 patch on the skin 1 (One) Time Per Week. Friday    furosemide (LASIX) 40 MG tablet  Nursing Home Yes Yes    Take 40 mg by mouth 3 (Three) Times a Day.    hydrALAZINE (APRESOLINE) 100 MG tablet  Nursing Home No Yes    Take 1 tablet by mouth Every 8 (Eight) Hours.    HYDROcodone-acetaminophen (NORCO) 5-325 MG per tablet  Nursing Home Yes Yes    Take 1 tablet by mouth Every 6 (Six) Hours As Needed.     hydrOXYzine (ATARAX) 25 MG tablet  Nursing Home Yes Yes    Take 25 mg by mouth Every 6 (Six) Hours As Needed for Itching.    insulin aspart (novoLOG) 100 UNIT/ML injection  Nursing Home Yes Yes    Inject 10 Units under the skin Every Morning Before Breakfast.    insulin aspart (novoLOG) 100 UNIT/ML injection  Nursing Home Yes Yes    Inject 8 Units under the skin Daily Before Lunch.    insulin aspart (novoLOG) 100 UNIT/ML injection  Nursing Home Yes Yes    Inject 10 Units under the skin Daily Before Supper.    insulin detemir (LEVEMIR) 100 UNIT/ML injection  Nursing Home Yes Yes    Inject 12 Units under the skin Every Morning.    ipratropium-albuterol (DUO-NEB) 0.5-2.5 mg/mL nebulizer  Nursing Home Yes Yes    Take 3 mL by nebulization 4 (Four) Times a Day.    isosorbide mononitrate (IMDUR) 30 MG 24 hr tablet  Nursing Home No Yes    Take 1 tablet by mouth Every 12 (Twelve) Hours.    labetalol (NORMODYNE) 100 MG tablet  Nursing Home Yes Yes    Take 100 mg by mouth 2 (Two) Times a Day.    lisinopril (PRINIVIL,ZESTRIL) 40 MG tablet  Nursing Home Yes Yes    Take 40 mg by mouth Daily.    Multiple Vitamins-Minerals (MULTIVITAMIN & MINERAL PO)  Nursing Home Yes Yes    Take 1 tablet by mouth Daily.    nitroglycerin (NITROSTAT) 0.4 MG SL tablet  Nursing Home Yes Yes    Place 0.4 mg under the tongue Every 5 (Five) Minutes As Needed for chest pain. Take no more than 3 doses in 15 minutes.    potassium chloride (K-DUR,KLOR-CON) 20 MEQ CR tablet  Nursing Home Yes Yes    Take 40 mEq by mouth Daily.    risperiDONE (risperDAL M-TABS) 1 MG disintegrating tablet  Nursing Home No Yes    Take 1 tablet by mouth Every Night.            Medication notes: Added per facility list: Vitamin D 1000, Labetalol, Beneprotein, Multivitamin, Eliquis, Hydroxyzine, Norco    Removed: Metoprolol, Ultram, Vitamin D 50,000    This medication list is complete to the best of my knowledge as of 1/9/2018    Please call if questions.    Jenae Triana,  Medication History Technician  1/9/2018 10:33 PM

## 2018-01-10 NOTE — NURSING NOTE
Pt seen for lower extremity assessment.  Pt c/o tenderness in left lower extremity; mild erythema noted, skin dry and peeling; no edema, no warmth noted.  Right lower extremity with dry, peeling skin; no edema, no warmth or tenderness noted.  Noted unstageable pressure injuries on bilateral heels; dry, black eschar, no drainage, no s/s of infection.  Elevated legs with heels off mattress.  Will use moisturizer to legs and paint heels with betadine

## 2018-01-10 NOTE — PROGRESS NOTES
Continued Stay Note  [facility]     Patient Name: Britney Deras  MRN: [id number]  Today's Date: 1/10/2018    Admit Date: 1/9/2018          Discharge Plan       01/10/18 1627    Case Management/Social Work Plan    Plan Pt is from an  level of care @ [facility].  Has state guardian Mirela Kramer [phone]) left msg asking her to call back  to [facility] to confirm dc plans.  Pt is from an  level of care @ [facility] with 14 day behold in place.  MD has asked for DNR paperwork on pt, [facility] placed on chart.  Will follow.               Discharge Codes     None            Radha Hernandez RN

## 2018-01-10 NOTE — THERAPY EVALUATION
Acute Care - Physical Therapy Initial Evaluation  [facility]     Patient Name: Britney Deras  : 1935  MRN: [id number]  Today's Date: 1/10/2018   Onset of Illness/Injury or Date of Surgery Date: 18     Referring Physician: Ramila      Admit Date: 2018     Visit Dx:    ICD-10-CM ICD-9-CM   1. Chest pain, unspecified type R07.9 786.50   2. Acute UTI N39.0 599.0   3. Decreased mobility R26.89 781.99     Patient Active Problem List   Diagnosis   • NSTEMI (non-ST elevated myocardial infarction)   • Diabetes mellitus   • Hyperlipidemia   • Hypertension   • Dementia   • Schizoaffective disorder   • Chronic renal insufficiency, stage III (moderate)   • Chronic diastolic (congestive) heart failure   • Lymphedema of both lower extremities   • Chest pain   • Coronary artery disease   • Paranoid schizophrenia   • Chronic kidney disease   • Lymphedema   • Acute deep vein thrombosis (DVT) of both lower extremities     Past Medical History:   Diagnosis Date   • Angina pectoris    • Angina pectoris    • Asthma    • Atherosclerosis    • Cellulitis    • CHF (congestive heart failure)    • Chronic kidney disease    • Coronary artery disease    • Dementia    • Depression    • Diabetes mellitus    • GERD (gastroesophageal reflux disease)    • Hyperlipidemia    • Hypertension    • Lymphedema    • NSTEMI (non-ST elevated myocardial infarction)    • Stroke    • TIA (transient ischemic attack)      Past Surgical History:   Procedure Laterality Date   • CHOLECYSTECTOMY     • HYSTERECTOMY     • PERIPHERALLY INSERTED CENTRAL CATHETER INSERTION            PT ASSESSMENT (last 72 hours)      PT Evaluation       01/10/18 1305 01/10/18 0050    Rehab Evaluation    Document Type evaluation  -EE     Subjective Information agree to therapy;complains of;pain  -EE     Patient Effort, Rehab Treatment good  -EE     Symptoms Noted During/After Treatment none  -EE     General Information    Onset of Illness/Injury or Date of Surgery  Date 01/09/18  -EE     Referring Physician Ramila  -EE     General Observations Pt supine in bed in no acute distress  -EE     Pertinent History Of Current Problem admitted w/chest pain, acute UTI  -EE     Precautions/Limitations fall precautions  -EE     Prior Level of Function independent:;all household mobility  -EE     Equipment Currently Used at Home walker, rolling  -EE     Plans/Goals Discussed With patient;agreed upon  -EE     Barriers to Rehab none identified  -EE     Living Environment    Lives With alone  -EE alone  -CS    Living Arrangements assisted living  -EE assisted living  -CS    Home Accessibility no concerns  -EE no concerns  -CS    Stair Railings at Home  none  -CS    Type of Financial/Environmental Concern  none  -CS    Transportation Available  ambulance  -CS    Clinical Impression    Patient/Family Goals Statement Go home  -EE     Criteria for Skilled Therapeutic Interventions Met yes;treatment indicated  -EE     Pathology/Pathophysiology Noted (Describe Specifically for Each System) musculoskeletal  -EE     Impairments Found (describe specific impairments) gait, locomotion, and balance  -EE     Rehab Potential good, to achieve stated therapy goals  -EE     Pain Assessment    Pain Assessment 0-10  -EE     Pain Score 6  -EE     Pain Type Acute pain  -EE     Pain Location Leg  -EE     Pain Orientation Left  -EE     Pain Intervention(s) Repositioned;Ambulation/increased activity  -EE     Response to Interventions tolerated  -EE     Cognitive Assessment/Intervention    Current Cognitive/Communication Assessment functional  -EE     Orientation Status oriented x 4  -EE     Follows Commands/Answers Questions 100% of the time  -EE     Personal Safety mild impairment  -EE     Personal Safety Interventions fall prevention program maintained;gait belt;nonskid shoes/slippers when out of bed;supervised activity  -EE     ROM (Range of Motion)    General ROM no range of motion deficits identified  -EE      General ROM Detail B LEs grossly WFL  -EE     MMT (Manual Muscle Testing)    General MMT Assessment lower extremity strength deficits identified  -EE     General MMT Assessment Detail generalized weakness; B LEs grossly 3+/5  -EE     Bed Mobility, Assessment/Treatment    Bed Mobility, Assistive Device bed rails;head of bed elevated  -EE     Bed Mob, Supine to Sit, [place]re contact guard assist;verbal cues required  -EE     Bed Mob, Sit to Supine, Barre moderate assist (50% patient effort);verbal cues required  -EE     Bed Mobility, Impairments strength decreased  -EE     Transfer Assessment/Treatment    Transfers, Sit-Stand [place] contact guard assist;verbal cues required  -EE     Transfers, Stand-Sit [place] contact guard assist;verbal cues required  -EE     Transfers, Sit-Stand-Sit, Assist Device rolling walker  -EE     Transfer, Safety Issues weight-shifting ability decreased;step length decreased;balance decreased during turns  -EE     Transfer, Impairments strength decreased;impaired balance;pain  -EE     Transfer, Comment verbal cues required for hand placement  -EE     Gait Assessment/Treatment    Gait, [place] Level contact guard assist;verbal cues required  -EE     Gait, Assistive Device rolling walker  -EE     Gait, Distance (Feet) 65  -EE     Gait, Gait Deviations forward flexed posture;saad decreased;decreased heel strike;step length decreased;stride length decreased  -EE     Gait, Safety Issues step length decreased;balance decreased during turns  -EE     Gait, Impairments strength decreased;impaired balance;pain  -EE     Gait, Comment Fatigue and L LE pain limiting  -EE     Motor Skills/Interventions    Additional Documentation Balance Skills Training (Group)  -EE     Balance Skills Training    Sitting-Level of Assistance Close supervision  -EE     Sitting-Balance Support Feet supported  -EE     Standing-Level of Assistance Contact guard  -EE     Static Standing Balance  Support assistive device  -EE     Gait Balance-Level of Assistance Contact guard  -EE     Gait Balance Support assistive device  -EE     Positioning and Restraints    Pre-Treatment Position in bed  -EE     Post Treatment Position bed  -EE     In Bed supine;call light within reach;encouraged to call for assist;exit alarm on;with nsg   with nsg assistantSuzy  -       01/10/18 0045       General Information    Equipment Currently Used at Home walker, standard  -CS       User Key  (r) = Recorded By, (t) = Taken By, (c) = Cosigned By    Initials Name Provider Type    CHAN Salter PT Physical Therapist    CS Angel Brooks RN Registered Nurse          Physical Therapy Education     Title: PT OT SLP Therapies (Active)     Topic: Physical Therapy (Active)     Point: Mobility training (Active)    Learning Progress Summary    Learner Readiness Method Response Comment Documented by Status   Patient Acceptance E,TB NR   01/10/18 1331 Active               Point: Body mechanics (Active)    Learning Progress Summary    Learner Readiness Method Response Comment Documented by Status   Patient Acceptance E,TB NR   01/10/18 1331 Active                      User Key     Initials Effective Dates Name Provider Type Discipline     12/01/15 -  Beverly Salter PT Physical Therapist PT                PT Recommendation and Plan  Anticipated Discharge Disposition: assisted living  Planned Therapy Interventions: balance training, bed mobility training, gait training, home exercise program, patient/family education, strengthening, transfer training  PT Frequency: daily  Plan of Care Review  Plan Of Care Reviewed With: patient  Outcome Summary/Follow up Plan: Pt presents from assisted living with chest pain and acute UTI. Upon exam, pt demonstrates L LE pain, generalized weakness, impaired balance, and decreased endurance limiting mobility. Pt was independent w/rwx prior to admission, currently requiring assist of one due to above  impairments. Pt would benefit from continued PT to address impairments and assist w/return to PLOF.           IP PT Goals       01/10/18 1331          Bed Mobility PT LTG    Bed Mobility PT LTG, Date Established 01/10/18  -EE      Bed Mobility PT LTG, Time to Achieve 1 wk  -EE      Bed Mobility PT LTG, Activity Type all bed mobility  -EE      Bed Mobility PT LTG, [place] Level supervision required  -EE      Transfer Training PT LTG    Transfer Training PT LTG, Date Established 01/10/18  -EE      Transfer Training PT LTG, Time to Achieve 1 wk  -EE      Transfer Training PT LTG, Activity Type all transfers  -EE      Transfer Training PT LTG, [place] Level supervision required  -EE      Transfer Training PT LTG, Assist Device walker, rolling  -EE      Gait Training PT LTG    Gait Training Goal PT LTG, Date Established 01/10/18  -EE      Gait Training Goal PT LTG, Time to Achieve 1 wk  -EE      Gait Training Goal PT LTG, [place] Level contact guard assist  -EE      Gait Training Goal PT LTG, Assist Device walker, rolling  -EE      Gait Training Goal PT LTG, Distance to Achieve 150  -EE        User Key  (r) = Recorded By, (t) = Taken By, (c) = Cosigned By    Initials Name Provider Type    EE Beverly Salter, PT Physical Therapist                Outcome Measures       01/10/18 1300          How much help from another person do you currently need...    Turning from your back to your side while in flat bed without using bedrails? 3  -EE      Moving from lying on back to sitting on the side of a flat bed without bedrails? 3  -EE      Moving to and from a bed to a chair (including a wheelchair)? 3  -EE      Standing up from a chair using your arms (e.g., wheelchair, bedside chair)? 3  -EE      Climbing 3-5 steps with a railing? 1  -EE      To walk in hospital room? 3  -EE      AM-PAC 6 Clicks Score 16  -EE      Functional Assessment    Outcome Measure Options AM-PAC 6 Clicks Basic Mobility (PT)  -EE        User Key   (r) = Recorded By, (t) = Taken By, (c) = Cosigned By    Initials Name Provider Type    EE Beverly Salter PT Physical Therapist           Time Calculation:         PT Charges       01/10/18 1333          Time Calculation    Start Time 1305  -EE      Stop Time 1322  -EE      Time Calculation (min) 17 min  -EE      PT Received On 01/10/18  -EE      PT - Next Appointment 01/11/18  -EE      PT Goal Re-Cert Due Date 01/17/18  -EE        User Key  (r) = Recorded By, (t) = Taken By, (c) = Cosigned By    Initials Name Provider Type    EE Beverly Salter PT Physical Therapist          Therapy Charges for Today     Code Description Service Date Service Provider Modifiers Qty    95839760254 HC PT EVAL MOD COMPLEXITY 2 1/10/2018 Beverly Salter, PT GP 1    72119703536 HC PT THER SUPP EA 15 MIN 1/10/2018 Beverly Salter PT GP 1          PT G-Codes  Outcome Measure Options: AM-PAC 6 Clicks Basic Mobility (PT)      Beverly Salter PT  1/10/2018

## 2018-01-10 NOTE — PLAN OF CARE
Problem: Patient Care Overview (Adult)  Goal: Plan of Care Review   01/10/18 1331   Coping/Psychosocial Response Interventions   Plan Of Care Reviewed With patient   Outcome Evaluation   Outcome Summary/Follow up Plan Pt presents from assisted living with chest pain and acute UTI. Upon exam, pt demonstrates L LE pain, generalized weakness, impaired balance, and decreased endurance limiting mobility. Pt was independent w/rwx prior to admission, currently requiring assist of one due to above impairments. Pt would benefit from continued PT to address impairments and assist w/return to PLOF.        Problem: Inpatient Physical Therapy  Goal: Bed Mobility Goal LTG- PT   01/10/18 1331   Bed Mobility PT LTG   Bed Mobility PT LTG, Date Established 01/10/18   Bed Mobility PT LTG, Time to Achieve 1 wk   Bed Mobility PT LTG, Activity Type all bed mobility   Bed Mobility PT LTG, [place] Level supervision required     Goal: Transfer Training Goal 1 LTG- PT   01/10/18 1331   Transfer Training PT LTG   Transfer Training PT LTG, Date Established 01/10/18   Transfer Training PT LTG, Time to Achieve 1 wk   Transfer Training PT LTG, Activity Type all transfers   Transfer Training PT LTG, [place] Level supervision required   Transfer Training PT LTG, Assist Device walker, rolling     Goal: Gait Training Goal LTG- PT   01/10/18 1331   Gait Training PT LTG   Gait Training Goal PT LTG, Date Established 01/10/18   Gait Training Goal PT LTG, Time to Achieve 1 wk   Gait Training Goal PT LTG, [place] Level contact guard assist   Gait Training Goal PT LTG, Assist Device walker, rolling   Gait Training Goal PT LTG, Distance to Achieve 150

## 2018-01-10 NOTE — H&P
HISTORY AND PHYSICAL   [facility], [facility]      1/10/2018    Patient Identification:    Name: Britney Deras  Age: 82 y.o.  Sex: female  :  1935  MRN: [id number]                       Primary Care Physician: Gustavo Mcgrath MD    Chief Complaint:  Chest Pain    Chief Complaint   Patient presents with   • Altered Mental Status   • Shortness of Breath         History of Present Illness:     Ms. Britney Deras is a 82 year old female resident of a NH with a prior medical history of Schizophrenia, CHF, CKD, HTN, DMII, HLD, and CAD who has been declining steadily for the past 2-3 months. She has had long history of refusal of medications, hospitalization, Bathing, and treatment. She has had significant lymphedema bilateral LE's, non- compliant with elevation and treatment. She was recently started on eliquis for bilateral LE DVT's. She has had intermittent CP for about 3 months, refusing hospitalization. She routinely refuses insulin and refuses to allow psychiatry to assist her. About 10 days ago she started refusing all food and drink. She was pursuaded to allow blood draw and she was found to be in ARF. She agreed to start of IVF's, but not hospitalization. She refused bladder catheterization. She was doing a little better, but still refusing all food. Yesterday, she developed CP and was not able to catch her breath. She agreed to go to ED. There, her BNP was > 2000 and her urine had TNTC WBC's She is admitted for further evaluation and treatment. She was started on rocephin for presumptive UTI. This morning, she denies CP or SOA, but is becoming a little more agitated- so far able to be redirected.     Past Medical History:  Past Medical History:   Diagnosis Date   • Angina pectoris    • Angina pectoris    • Asthma    • Atherosclerosis    • Cellulitis    • CHF (congestive heart failure)    • Chronic kidney disease    • Coronary artery disease    • Dementia    • Depression    • Diabetes  mellitus    • GERD (gastroesophageal reflux disease)    • Hyperlipidemia    • Hypertension    • Lymphedema    • NSTEMI (non-ST elevated myocardial infarction)    • Stroke    • TIA (transient ischemic attack)      Past Surgical History:  Past Surgical History:   Procedure Laterality Date   • CHOLECYSTECTOMY     • HYSTERECTOMY     • PERIPHERALLY INSERTED CENTRAL CATHETER INSERTION        Home Meds:  Prescriptions Prior to Admission   Medication Sig Dispense Refill Last Dose   • acetaminophen (TYLENOL) 325 MG tablet Take 650 mg by mouth Every 4 (Four) Hours As Needed for mild pain (1-3).   Unknown at Unknown time   • apixaban (ELIQUIS) 5 MG tablet tablet Take 5 mg by mouth 2 (Two) Times a Day.      • aspirin 325 MG tablet Take 325 mg by mouth Daily.   3/23/2017 at Unknown time   • atorvastatin (LIPITOR) 40 MG tablet Take 1 tablet by mouth Daily. 30 tablet 0 3/22/2017 at Unknown time   • beneprotein powder Take 1 g by mouth 2 (Two) Times a Day.      • cholecalciferol (VITAMIN D3) 1000 units tablet Take 1,000 Units by mouth Daily.      • CloNIDine (CATAPRES-TTS) 0.3 MG/24HR patch Place 1 patch on the skin 1 (One) Time Per Week. Friday      • furosemide (LASIX) 40 MG tablet Take 40 mg by mouth 3 (Three) Times a Day.   3/23/2017 at Unknown time   • hydrALAZINE (APRESOLINE) 100 MG tablet Take 1 tablet by mouth Every 8 (Eight) Hours. 90 tablet 2    • HYDROcodone-acetaminophen (NORCO) 5-325 MG per tablet Take 1 tablet by mouth Every 6 (Six) Hours As Needed.      • hydrOXYzine (ATARAX) 25 MG tablet Take 25 mg by mouth Every 6 (Six) Hours As Needed for Itching.      • insulin aspart (novoLOG) 100 UNIT/ML injection Inject 10 Units under the skin Every Morning Before Breakfast.   3/23/2017 at Unknown time   • insulin aspart (novoLOG) 100 UNIT/ML injection Inject 8 Units under the skin Daily Before Lunch.   3/23/2017 at Unknown time   • insulin aspart (novoLOG) 100 UNIT/ML injection Inject 10 Units under the skin Daily Before  Supper.   3/22/2017 at Unknown time   • insulin detemir (LEVEMIR) 100 UNIT/ML injection Inject 12 Units under the skin Every Morning.   3/22/2017 at Unknown time   • ipratropium-albuterol (DUO-NEB) 0.5-2.5 mg/mL nebulizer Take 3 mL by nebulization 4 (Four) Times a Day.   3/23/2017 at Unknown time   • isosorbide mononitrate (IMDUR) 30 MG 24 hr tablet Take 1 tablet by mouth Every 12 (Twelve) Hours. 60 tablet 0 3/23/2017 at Unknown time   • labetalol (NORMODYNE) 100 MG tablet Take 100 mg by mouth 2 (Two) Times a Day.      • lisinopril (PRINIVIL,ZESTRIL) 40 MG tablet Take 40 mg by mouth Daily.   3/23/2017 at Unknown time   • Multiple Vitamins-Minerals (MULTIVITAMIN & MINERAL PO) Take 1 tablet by mouth Daily.      • nitroglycerin (NITROSTAT) 0.4 MG SL tablet Place 0.4 mg under the tongue Every 5 (Five) Minutes As Needed for chest pain. Take no more than 3 doses in 15 minutes.   Past Week at Unknown time   • potassium chloride (K-DUR,KLOR-CON) 20 MEQ CR tablet Take 40 mEq by mouth Daily.   3/23/2017 at Unknown time   • risperiDONE (risperDAL M-TABS) 1 MG disintegrating tablet Take 1 tablet by mouth Every Night. 30 tablet 0 3/23/2017 at Unknown time       Allergies:  Allergies   Allergen Reactions   • Methyldopa    • Sulfa Antibiotics      Immunizations:    There is no immunization history on file for this patient.  Social History:   Social History     Social History Narrative     Social History   Substance Use Topics   • Smoking status: Former Smoker     Quit date:    • Smokeless tobacco: Never Used   • Alcohol use No     Family History:  History reviewed. No pertinent family history.     Review of Systems  See history of present illness and past medical history.    Resident refuses to answer most of questions and the ones she has answered have been inaccurate- she is not reliable historian.     Objective:    Exam:    tMax 24 hrs: Temp (24hrs), Av.6 °F (36.4 °C), Min:97.5 °F (36.4 °C), Max:97.6 °F (36.4  "°C)    Vitals Ranges:   Temp:  [97.5 °F (36.4 °C)-97.6 °F (36.4 °C)] 97.5 °F (36.4 °C)  Heart Rate:  [68-82] 82  Resp:  [18] 18  BP: (131-200)/(55-90) 148/55    /55 (BP Location: Right arm, Patient Position: Lying)  Pulse 82  Temp 97.5 °F (36.4 °C) (Oral)   Resp 18  Ht 154.9 cm (61\")  Wt 93.2 kg (205 lb 8 oz)  SpO2 98%  BMI 38.83 kg/m2    General: Alert, oriented x 2-3 . Cooperative, no distress, appears stated age  HEENT:    Head: Normocephalic, without obvious abnormality, atraumatic  Eyes: EOM are normal. Pupils are equal, round, and reactive to light.   Oropharynx: Mucosa and tongue normal  Neck: Supple, symmetrical, trachea midline, no adenopathy;              thyroid:  no enlargement/tenderness/nodules;              no carotid bruit or JVD  Cardiovascular: Normal rate, regular rhythm and intact distal pulses.              Exam reveals no gallop and no friction rub. No murmur heard  Chest wall: No tenderness or deformity  Pulmonary: Clear to auscultation bilaterally, respirations unlabored.               No rhonchi, wheezing or rales.   Abdominal: Soft. Soft, non-tender, bowel sounds active all four quadrants,     no masses, no hepatomegaly, no splenomegaly.   Extremities: Normal, atraumatic, no cyanosis. Chronic edema bilateral LE's with flaking skin LLE  Pulses: 2 + symmetric all extremities  Neurological: Patient is alert and oriented to person, place, and time.                 CNII-XII intact, normal strength, sensation intact throughout  Skin: Skin color, texture, turgor normal, no rashes or lesions      Data Review:      Results from last 7 days  Lab Units 01/10/18  0630 01/09/18  1828   WBC 10*3/mm3 8.21 9.44   HEMOGLOBIN g/dL 8.6* 10.5*   HEMATOCRIT % 28.3* 35.4*   PLATELETS 10*3/mm3 273 313         Results from last 7 days  Lab Units 01/10/18  0630 01/09/18  1828   SODIUM mmol/L 139 139   POTASSIUM mmol/L 3.6 4.2   CHLORIDE mmol/L 102 99   CO2 mmol/L 23.5 25.0   BUN mg/dL 27* 27* "   CREATININE mg/dL 1.16* 1.30*   CALCIUM mg/dL 8.3* 9.3   BILIRUBIN mg/dL  --  0.3   ALK PHOS U/L  --  134*   ALT (SGPT) U/L  --  34*   AST (SGOT) U/L  --  34*   GLUCOSE mg/dL 120* 205*       Estimated Creatinine Clearance: 39 mL/min (by C-G formula based on Cr of 1.16).    Brief Urine Lab Results  (Last result in the past 365 days)      Color   Clarity   Blood   Leuk Est   Nitrite   Protein   CREAT   Urine HCG        01/09/18 2310 Yellow Cloudy(A) Negative Large (3+)(A) Negative Negative                 Results from last 7 days  Lab Units 01/09/18  2257   INR  1.67*         Lab Results  Lab Value Date/Time   TROPONINT 0.022 01/10/2018 0630   TROPONINT 0.022 01/09/2018 1828   TROPONINT 0.021 04/20/2017 2134   TROPONINT 0.024 04/20/2017 1710   TROPONINT <0.010 03/24/2017 0517   TROPONINT 0.264 (C) 02/15/2017 0527   TROPONINT 0.424 (C) 02/14/2017 0415   TROPONINT 0.261 (C) 02/13/2017 1311        Imaging Results (all)     Procedure Component Value Units Date/Time    XR Chest 2 View [[id number]] Collected:  01/09/18 1649     Updated:  01/09/18 2032    Narrative:       EMERGENCY PA AND LATERAL CHEST X-RAY 01/09/2018     CLINICAL HISTORY: Shortness of breath, cough, weakness, dizziness,  altered mental status, history of asthma and diabetes, hypertension.     COMPARISON: This is correlated to a prior chest x-ray 03/23/2017.     FINDINGS: There is a right arm PICC line in place, distal tip projects  over the expected location of the right subclavian vein just inferior to  the right 2nd posterior rib. Cardiomediastinal silhouette is upper  limits of normal to mildly enlarged, pulmonary vasculature is within  normal limits. Lungs are clear, costophrenic angles are sharp. There are  clips in the right upper quadrant from previous cholecystectomy.       Impression:          1. No active disease is seen in the chest. There is a right arm PICC  line in place, distal tip projects inferior to the right 2nd posterior  rib in the  expected location of the right subclavian vein. There are  clips in the right upper quadrant from previous cholecystectomy.     This report was finalized on 1/9/2018 8:29 PM by Dr. Tai Velasquez MD.       CT Angiogram Chest With Contrast [[id number]] Collected:  01/10/18 0155     Updated:  01/10/18 0155    Narrative:       CT ANGIOGRAM CHEST WITH CONTRAST.     TECHNIQUE: Radiation dose reduction techniques were utilized, including  automated exposure control and exposure modulation based on body size. A  routine enhanced CTA of the chest was performed, tailored in order to  evaluate the pulmonary arteries. Sagittal and coronal two-dimensional  reformations are provided for review. Three-dimensional reformations are  also provided.     HISTORY: Chest pain, DVT.     COMPARISON: No prior studies for comparison.     FINDINGS: Limited study due to motion.     No occlusive filling defects are seen in the main pulmonary artery or  its first-degree branches to suggest acute pulmonary embolism.         There is no mediastinal lymphadenopathy or pericardial effusion.         No consolidation or effusion.          Post cholecystectomy. 1 cm cyst of the left kidney. Small hiatal hernia.       Impression:       1.  Limited study due to motion.  2.  No evidence for acute pulmonary embolism.                    Assessment:    Active Problems:    Chest pain    Coronary artery disease    Paranoid schizophrenia    Chronic kidney disease    Lymphedema    Acute deep vein thrombosis (DVT) of both lower extremities  UTI  Dehydration  Dementia        Patient Active Problem List   Diagnosis Code   • NSTEMI (non-ST elevated myocardial infarction) I21.4   • Diabetes mellitus E11.9   • Hyperlipidemia E78.5   • Hypertension I10   • Dementia F03.90   • Schizoaffective disorder F25.9   • Chronic renal insufficiency, stage III (moderate) N18.3   • Chronic diastolic (congestive) heart failure I50.32   • Lymphedema of both lower extremities I89.0   •  Chest pain R07.9   • Coronary artery disease I25.10   • Paranoid schizophrenia F20.0   • Chronic kidney disease N18.9   • Lymphedema I89.0   • Acute deep vein thrombosis (DVT) of both lower extremities I82.403       Plan:    Inpatient admission  IV fluids, patient is dehydrated  IV antibiotics for UTI  Await culture results  Monitor and correct electrolytes  Accucheck and SSI  Monitor mental status  Wound consult for BIlateral LE lymphedema  Will request DNR status for state guardian. Ms. Deras continues in a decline which is impossible to stop due to her refusal of medications and care. She has been expressing increased fear, anxiety and pain in the last 2-3 months.   Home medications  DVT/stress ulcer prophylaxis  PT/OT/ST consult when appropiate  Labs in am        Gustavo Mcgrath MD  1/10/2018

## 2018-01-11 LAB
ANION GAP SERPL CALCULATED.3IONS-SCNC: 13.6 MMOL/L
BASOPHILS # BLD AUTO: 0.01 10*3/MM3 (ref 0–0.2)
BASOPHILS NFR BLD AUTO: 0.1 % (ref 0–1.5)
BUN BLD-MCNC: 17 MG/DL (ref 8–23)
BUN/CREAT SERPL: 17.7 (ref 7–25)
CALCIUM SPEC-SCNC: 7.9 MG/DL (ref 8.6–10.5)
CHLORIDE SERPL-SCNC: 107 MMOL/L (ref 98–107)
CO2 SERPL-SCNC: 22.4 MMOL/L (ref 22–29)
CREAT BLD-MCNC: 0.96 MG/DL (ref 0.57–1)
DEPRECATED RDW RBC AUTO: 42.2 FL (ref 37–54)
EOSINOPHIL # BLD AUTO: 0.17 10*3/MM3 (ref 0–0.7)
EOSINOPHIL NFR BLD AUTO: 2.5 % (ref 0.3–6.2)
ERYTHROCYTE [DISTWIDTH] IN BLOOD BY AUTOMATED COUNT: 14.8 % (ref 11.7–13)
GFR SERPL CREATININE-BSD FRML MDRD: 56 ML/MIN/1.73
GLUCOSE BLD-MCNC: 97 MG/DL (ref 65–99)
GLUCOSE BLDC GLUCOMTR-MCNC: 113 MG/DL (ref 70–130)
GLUCOSE BLDC GLUCOMTR-MCNC: 116 MG/DL (ref 70–130)
GLUCOSE BLDC GLUCOMTR-MCNC: 160 MG/DL (ref 70–130)
GLUCOSE BLDC GLUCOMTR-MCNC: 80 MG/DL (ref 70–130)
HCT VFR BLD AUTO: 25.8 % (ref 35.6–45.5)
HGB BLD-MCNC: 7.8 G/DL (ref 11.9–15.5)
IMM GRANULOCYTES # BLD: 0.02 10*3/MM3 (ref 0–0.03)
IMM GRANULOCYTES NFR BLD: 0.3 % (ref 0–0.5)
LYMPHOCYTES # BLD AUTO: 2.22 10*3/MM3 (ref 0.9–4.8)
LYMPHOCYTES NFR BLD AUTO: 32.1 % (ref 19.6–45.3)
MCH RBC QN AUTO: 23.9 PG (ref 26.9–32)
MCHC RBC AUTO-ENTMCNC: 30.2 G/DL (ref 32.4–36.3)
MCV RBC AUTO: 78.9 FL (ref 80.5–98.2)
MONOCYTES # BLD AUTO: 0.56 10*3/MM3 (ref 0.2–1.2)
MONOCYTES NFR BLD AUTO: 8.1 % (ref 5–12)
NEUTROPHILS # BLD AUTO: 3.93 10*3/MM3 (ref 1.9–8.1)
NEUTROPHILS NFR BLD AUTO: 56.9 % (ref 42.7–76)
PLATELET # BLD AUTO: 279 10*3/MM3 (ref 140–500)
PMV BLD AUTO: 10.3 FL (ref 6–12)
POTASSIUM BLD-SCNC: 3.2 MMOL/L (ref 3.5–5.2)
RBC # BLD AUTO: 3.27 10*6/MM3 (ref 3.9–5.2)
SODIUM BLD-SCNC: 143 MMOL/L (ref 136–145)
WBC NRBC COR # BLD: 6.91 10*3/MM3 (ref 4.5–10.7)

## 2018-01-11 PROCEDURE — 63710000001 INSULIN REGULAR HUMAN PER 5 UNITS: Performed by: INTERNAL MEDICINE

## 2018-01-11 PROCEDURE — 80048 BASIC METABOLIC PNL TOTAL CA: CPT | Performed by: NURSE PRACTITIONER

## 2018-01-11 PROCEDURE — 93005 ELECTROCARDIOGRAM TRACING: CPT | Performed by: INTERNAL MEDICINE

## 2018-01-11 PROCEDURE — 93010 ELECTROCARDIOGRAM REPORT: CPT | Performed by: INTERNAL MEDICINE

## 2018-01-11 PROCEDURE — 85025 COMPLETE CBC W/AUTO DIFF WBC: CPT | Performed by: NURSE PRACTITIONER

## 2018-01-11 PROCEDURE — 82962 GLUCOSE BLOOD TEST: CPT

## 2018-01-11 PROCEDURE — 25010000002 CEFTRIAXONE PER 250 MG: Performed by: INTERNAL MEDICINE

## 2018-01-11 RX ORDER — HYDROCODONE BITARTRATE AND ACETAMINOPHEN 5; 325 MG/1; MG/1
1 TABLET ORAL EVERY 6 HOURS PRN
Status: DISCONTINUED | OUTPATIENT
Start: 2018-01-11 | End: 2018-01-13 | Stop reason: HOSPADM

## 2018-01-11 RX ADMIN — CEFTRIAXONE 1 G: 1 INJECTION, POWDER, FOR SOLUTION INTRAMUSCULAR; INTRAVENOUS at 22:58

## 2018-01-11 RX ADMIN — HYDROCODONE BITARTRATE AND ACETAMINOPHEN 1 TABLET: 5; 325 TABLET ORAL at 17:09

## 2018-01-11 RX ADMIN — HYDROCODONE BITARTRATE AND ACETAMINOPHEN 1 TABLET: 5; 325 TABLET ORAL at 23:13

## 2018-01-11 RX ADMIN — ISOSORBIDE MONONITRATE 30 MG: 30 TABLET ORAL at 10:56

## 2018-01-11 RX ADMIN — HYDROCODONE BITARTRATE AND ACETAMINOPHEN 1 TABLET: 5; 325 TABLET ORAL at 11:10

## 2018-01-11 RX ADMIN — FUROSEMIDE 40 MG: 40 TABLET ORAL at 17:08

## 2018-01-11 RX ADMIN — HYDRALAZINE HYDROCHLORIDE 50 MG: 50 TABLET, FILM COATED ORAL at 10:57

## 2018-01-11 RX ADMIN — RISPERIDONE 1 MG: 0.5 TABLET, ORALLY DISINTEGRATING ORAL at 22:56

## 2018-01-11 RX ADMIN — AMMONIUM LACTATE: 120 CREAM TOPICAL at 22:57

## 2018-01-11 RX ADMIN — APIXABAN 5 MG: 5 TABLET, FILM COATED ORAL at 10:56

## 2018-01-11 RX ADMIN — HUMAN INSULIN 8 UNITS: 100 INJECTION, SOLUTION SUBCUTANEOUS at 11:05

## 2018-01-11 RX ADMIN — SODIUM CHLORIDE 125 ML/HR: 9 INJECTION, SOLUTION INTRAVENOUS at 06:05

## 2018-01-11 RX ADMIN — LISINOPRIL 40 MG: 40 TABLET ORAL at 10:56

## 2018-01-11 RX ADMIN — AMMONIUM LACTATE: 120 CREAM TOPICAL at 10:58

## 2018-01-11 RX ADMIN — FUROSEMIDE 40 MG: 40 TABLET ORAL at 22:56

## 2018-01-11 RX ADMIN — ATORVASTATIN CALCIUM 40 MG: 20 TABLET, FILM COATED ORAL at 10:57

## 2018-01-11 RX ADMIN — LABETALOL HCL 100 MG: 100 TABLET, FILM COATED ORAL at 10:56

## 2018-01-11 RX ADMIN — FUROSEMIDE 40 MG: 40 TABLET ORAL at 10:56

## 2018-01-11 RX ADMIN — HUMAN INSULIN 8 UNITS: 100 INJECTION, SOLUTION SUBCUTANEOUS at 14:27

## 2018-01-11 RX ADMIN — MULTIPLE VITAMINS W/ MINERALS TAB 1 TABLET: TAB at 10:57

## 2018-01-11 RX ADMIN — LABETALOL HCL 100 MG: 100 TABLET, FILM COATED ORAL at 22:56

## 2018-01-11 RX ADMIN — HYDRALAZINE HYDROCHLORIDE 50 MG: 50 TABLET, FILM COATED ORAL at 14:27

## 2018-01-11 RX ADMIN — POTASSIUM CHLORIDE 10 MEQ: 750 CAPSULE, EXTENDED RELEASE ORAL at 10:57

## 2018-01-11 RX ADMIN — APIXABAN 5 MG: 5 TABLET, FILM COATED ORAL at 22:56

## 2018-01-11 NOTE — PROGRESS NOTES
DAILY PROGRESS NOTE  [facility], [facility]    2018    Patient Identification:  Name: Britney Deras  Age: 82 y.o.  Sex: female  :  1935  MRN: [id number]           Primary Care Physician: Gustavo Mcgrath MD    Subjective:    Interval History:    Continues IVF's and IV antibiotics for UTI, dehydration. Culture pending  Wound care recommending betadine to bilateral heels for unstagabe wounds  Cooperating with staff  Request for DNR status with state guardian in process    ROS:   Denies CP, SOA, N/V/D. Complaining of pain in left leg.     Objective:    Scheduled Meds:    ammonium lactate  Topical BID   apixaban 5 mg Oral Q12H   atorvastatin 40 mg Oral Daily   ceftriaxone 1 g Intravenous Q24H   [START ON 2018] CloNIDine 1 patch Transdermal Weekly   furosemide 40 mg Oral TID   hydrALAZINE 50 mg Oral Q8H   insulin detemir 12 Units Subcutaneous Daily   insulin regular 8 Units Subcutaneous TID With Meals   ipratropium-albuterol 3 mL Nebulization 4x Daily - RT   isosorbide mononitrate 30 mg Oral Q24H   labetalol 100 mg Oral Q12H   lisinopril 40 mg Oral Daily   multivitamin with minerals 1 tablet Oral Daily   potassium chloride 40 mEq Oral Daily   risperiDONE 1 mg Oral Nightly       Continuous Infusions:    sodium chloride 125 mL/hr Last Rate: 125 mL/hr (18 06)       PRN Meds:  •  acetaminophen  •  dextrose  •  dextrose  •  glucagon (human recombinant)  •  HYDROcodone-acetaminophen  •  HYDROcodone-acetaminophen  •  nitroglycerin  •  sodium chloride    Intake/Output:    Intake/Output Summary (Last 24 hours) at 18 1253  Last data filed at 18 06   Gross per 24 hour   Intake             1000 ml   Output                0 ml   Net             1000 ml         Exam:    tMax 24 hrs: Temp (24hrs), Av °F (37.2 °C), Min:98.1 °F (36.7 °C), Max:99.7 °F (37.6 °C)    Vitals Ranges:   Temp:  [98.1 °F (36.7 °C)-99.7 °F (37.6 °C)] 99.7 °F (37.6 °C)  Heart  "Rate:  [75-82] 76  Resp:  [18] 18  BP: (135-162)/(52-67) 162/67    /67 (BP Location: Left arm, Patient Position: Lying)  Pulse 76  Temp 99.7 °F (37.6 °C) (Oral)   Resp 18  Ht 154.9 cm (61\")  Wt 93.2 kg (205 lb 8 oz)  SpO2 98%  BMI 38.83 kg/m2    General: Alert, cooperative, no distress, appears stated age  Neck: Supple, symmetrical, trachea midline, no adenopathy;              thyroid:  no enlargement/tenderness/nodules;              no carotid bruit or JVD  Cardiovascular: Normal rate, regular rhythm and intact distal pulses.              Exam reveals no gallop and no friction rub. No murmur heard  Pulmonary: Clear to auscultation bilaterally, respirations unlabored.               No rhonchi, wheezing or rales.   Abdominal: Soft. Soft, non-tender, bowel sounds active all four quadrants,     no masses, no hepatomegaly, no splenomegaly.   Extremities: Normal, atraumatic, no cyanosis or edema  Neurological: Patient is alert and oriented to person, place, and time.                 CNII-XII intact, normal strength, sensation intact throughout  Skin: Unstagable wounds bilateral heels.       Data Review:      Results from last 7 days  Lab Units 01/11/18  0626 01/10/18  0630 01/09/18  1828   WBC 10*3/mm3 6.91 8.21 9.44   HEMOGLOBIN g/dL 7.8* 8.6* 10.5*   HEMATOCRIT % 25.8* 28.3* 35.4*   PLATELETS 10*3/mm3 279 273 313         Results from last 7 days  Lab Units 01/11/18  0626 01/10/18  0630 01/09/18  1828   SODIUM mmol/L 143 139 139   POTASSIUM mmol/L 3.2* 3.6 4.2   CHLORIDE mmol/L 107 102 99   CO2 mmol/L 22.4 23.5 25.0   BUN mg/dL 17 27* 27*   CREATININE mg/dL 0.96 1.16* 1.30*   CALCIUM mg/dL 7.9* 8.3* 9.3   BILIRUBIN mg/dL  --   --  0.3   ALK PHOS U/L  --   --  134*   ALT (SGPT) U/L  --   --  34*   AST (SGOT) U/L  --   --  34*   GLUCOSE mg/dL 97 120* 205*       Estimated Creatinine Clearance: 47.1 mL/min (by C-G formula based on Cr of 0.96).      Results from last 7 days  Lab Units 01/09/18  2257   INR  1.67* "         Lab Results  Lab Value Date/Time   TROPONINT 0.022 01/10/2018 0630   TROPONINT 0.022 01/09/2018 1828   TROPONINT 0.021 04/20/2017 2134   TROPONINT 0.024 04/20/2017 1710   TROPONINT <0.010 03/24/2017 0517   TROPONINT 0.264 (C) 02/15/2017 0527   TROPONINT 0.424 (C) 02/14/2017 0415   TROPONINT 0.261 (C) 02/13/2017 1311       Microbiology Results (last 10 days)     Procedure Component Value - Date/Time    Urine Culture - Urine, Urine, Clean Catch [[id number]]  (Abnormal) Collected:  01/09/18 2310    Lab Status:  Preliminary result Specimen:  Urine from Urine, Catheter Updated:  01/11/18 0641     Urine Culture --      >100,000 CFU/mL Gram Negative Bacilli (A)           Imaging Results (last 72 hours)     Procedure Component Value Units Date/Time    XR Chest 2 View [[id number]] Collected:  01/09/18 1649     Updated:  01/09/18 2032    Narrative:       EMERGENCY PA AND LATERAL CHEST X-RAY 01/09/2018     CLINICAL HISTORY: Shortness of breath, cough, weakness, dizziness,  altered mental status, history of asthma and diabetes, hypertension.     COMPARISON: This is correlated to a prior chest x-ray 03/23/2017.     FINDINGS: There is a right arm PICC line in place, distal tip projects  over the expected location of the right subclavian vein just inferior to  the right 2nd posterior rib. Cardiomediastinal silhouette is upper  limits of normal to mildly enlarged, pulmonary vasculature is within  normal limits. Lungs are clear, costophrenic angles are sharp. There are  clips in the right upper quadrant from previous cholecystectomy.       Impression:          1. No active disease is seen in the chest. There is a right arm PICC  line in place, distal tip projects inferior to the right 2nd posterior  rib in the expected location of the right subclavian vein. There are  clips in the right upper quadrant from previous cholecystectomy.     This report was finalized on 1/9/2018 8:29 PM by Dr. Tai Velasquez MD.       CT Angiogram  Chest With Contrast [[id number]] Collected:  01/10/18 0155     Updated:  01/10/18 0155    Narrative:       CT ANGIOGRAM CHEST WITH CONTRAST.     TECHNIQUE: Radiation dose reduction techniques were utilized, including  automated exposure control and exposure modulation based on body size. A  routine enhanced CTA of the chest was performed, tailored in order to  evaluate the pulmonary arteries. Sagittal and coronal two-dimensional  reformations are provided for review. Three-dimensional reformations are  also provided.     HISTORY: Chest pain, DVT.     COMPARISON: No prior studies for comparison.     FINDINGS: Limited study due to motion.     No occlusive filling defects are seen in the main pulmonary artery or  its first-degree branches to suggest acute pulmonary embolism.         There is no mediastinal lymphadenopathy or pericardial effusion.         No consolidation or effusion.          Post cholecystectomy. 1 cm cyst of the left kidney. Small hiatal hernia.       Impression:       1.  Limited study due to motion.  2.  No evidence for acute pulmonary embolism.                      Assessment:    Active Problems:    Chest pain    Coronary artery disease    Paranoid schizophrenia    Chronic kidney disease    Lymphedema    Acute deep vein thrombosis (DVT) of both lower extremities  UTI  TME    Patient Active Problem List   Diagnosis Code   • NSTEMI (non-ST elevated myocardial infarction) I21.4   • Diabetes mellitus E11.9   • Hyperlipidemia E78.5   • Hypertension I10   • Dementia F03.90   • Schizoaffective disorder F25.9   • Chronic renal insufficiency, stage III (moderate) N18.3   • Chronic diastolic (congestive) heart failure I50.32   • Lymphedema of both lower extremities I89.0   • Chest pain R07.9   • Coronary artery disease I25.10   • Paranoid schizophrenia F20.0   • Chronic kidney disease N18.9   • Lymphedema I89.0   • Acute deep vein thrombosis (DVT) of both lower extremities I82.403       Plan:    DC IV  fluids, pt has h/o chf  Continue IV antibiotics  Monitor and correct electrolytes  Adjust insulin as needed  Monitor mental status  Continue home medications  PT/OT/ST consulted  DVT/stress ulcer prophylaxis  Labs in am        Gustavo Mcgrath MD  1/11/2018  12:53 PM

## 2018-01-11 NOTE — PLAN OF CARE
Problem: Patient Care Overview (Adult)  Goal: Plan of Care Review  Outcome: Ongoing (interventions implemented as appropriate)   01/11/18 0607   Coping/Psychosocial Response Interventions   Plan Of Care Reviewed With patient   Patient Care Overview   Progress improving   Outcome Evaluation   Outcome Summary/Follow up Plan no c/o pain or other discomfort. Q 2 turn. pt calm and cooperative with care all night except slightly argumentative about medications. cont on IV fluids and antibiotic. VSS. NS rythm. will cont to monitor.      Goal: Discharge Needs Assessment  Outcome: Ongoing (interventions implemented as appropriate)      Problem: Pain, Acute (Adult)  Goal: Identify Related Risk Factors and Signs and Symptoms  Outcome: Ongoing (interventions implemented as appropriate)    Goal: Acceptable Pain Control/Comfort Level  Outcome: Ongoing (interventions implemented as appropriate)      Problem: Fall Risk (Adult)  Goal: Identify Related Risk Factors and Signs and Symptoms  Outcome: Ongoing (interventions implemented as appropriate)    Goal: Absence of Falls  Outcome: Ongoing (interventions implemented as appropriate)      Problem: Cardiac Output, Decreased (Adult)  Goal: Identify Related Risk Factors and Signs and Symptoms  Outcome: Ongoing (interventions implemented as appropriate)    Goal: Adequate Cardiac Output/Effective Tissue Perfusion  Outcome: Ongoing (interventions implemented as appropriate)

## 2018-01-11 NOTE — PLAN OF CARE
Problem: Patient Care Overview (Adult)  Goal: Plan of Care Review  Outcome: Ongoing (interventions implemented as appropriate)   01/11/18 9371   Outcome Evaluation   Outcome Summary/Follow up Plan no c/o chest pain, but left leg pain only. pain medication added today. Q2 turn. Pt refused occasional meds and occasional turns, but overall cooperative and pleasant. ivf discontinued.      Goal: Adult Individualization and Mutuality  Outcome: Ongoing (interventions implemented as appropriate)    Goal: Discharge Needs Assessment  Outcome: Ongoing (interventions implemented as appropriate)      Problem: Pain, Acute (Adult)  Goal: Identify Related Risk Factors and Signs and Symptoms  Outcome: Ongoing (interventions implemented as appropriate)    Goal: Acceptable Pain Control/Comfort Level  Outcome: Ongoing (interventions implemented as appropriate)      Problem: Fall Risk (Adult)  Goal: Identify Related Risk Factors and Signs and Symptoms  Outcome: Ongoing (interventions implemented as appropriate)    Goal: Absence of Falls  Outcome: Ongoing (interventions implemented as appropriate)      Problem: Cardiac Output, Decreased (Adult)  Goal: Identify Related Risk Factors and Signs and Symptoms  Outcome: Ongoing (interventions implemented as appropriate)    Goal: Adequate Cardiac Output/Effective Tissue Perfusion  Outcome: Ongoing (interventions implemented as appropriate)      Problem: Pressure Ulcer (Adult)  Goal: Signs and Symptoms of Listed Potential Problems Will be Absent or Manageable (Pressure Ulcer)  Outcome: Ongoing (interventions implemented as appropriate)

## 2018-01-11 NOTE — SIGNIFICANT NOTE
01/11/18 1401   Rehab Treatment   Discipline physical therapist   Treatment Not Performed patient/family declined treatment  (Pt declined PT, ambulating short distance in room, sitting EOB, and exercises due to wanting to rest.  Plan to attempt PT 1/12, PAIGE kirby.  )   Recommendation   PT - Next Appointment 01/12/18

## 2018-01-11 NOTE — PROGRESS NOTES
Adult Nutrition  Assessment/PES    Patient Name:  Britney Deras  YOB: 1935  MRN: [id number]  Admit Date:  1/9/2018    Assessment Date:  1/11/2018    Comments:  Nutrition assessment complete due to skin issues. Will follow po.          Reason for Assessment       01/11/18 1440    Reason for Assessment    Reason For Assessment/Visit admission assessment;skin risk;identified at risk by screening criteria    Diagnosis Diagnosis    Cardiac CHF;CAD;HTN;Hypercholesterolemia    Endocrine DM    Infectious Disease UTI    Neurological Dementia    Psychosocial Schizophrenia    Renal CKD    Skin Pressure ulcer              Nutrition/Diet History       01/11/18 1445    Nutrition/Diet History    Factors Affecting Nutritional Intake Factors    Reported/Observed By RN    Appetite Fair    Mental State/Condition Confusion    Other eats at her own pace and time. doesn't like to be bothered.            Anthropometrics       01/11/18 1441    Anthropometrics    RD Documented Weight on Admission 93.2 kg (205 lb 7.5 oz)    Body Mass Index (BMI)    BMI Grade 35 - 39.9 - obesity - grade II            Labs/Tests/Procedures/Meds       01/11/18 1441    Labs/Tests/Procedures/Meds    Diagnostic Test/Procedure Review reviewed, pertinent    Labs/Tests Review Reviewed;Glucose;K+    Medication Review Reviewed, pertinent;Antibiotic;Diuretic;Insulin;Multivitamin    Significant Vitals reviewed            Physical Findings       01/11/18 1443    Physical Findings/Assessment    Additional Documentation Physical Appearance (Group)    Physical Appearance    Overall Physical Appearance obese    Skin pressure ulcer(s)   heels and toe            Estimated/Assessed Needs       01/11/18 1443    Calculation Measurements    Weight Used For Calculations 93.2 kg (205 lb 7.5 oz)    Estimated/Assessed Energy Needs    Energy Need Method Kcal/kg    kcal/kg 20    20 Kcal/Kg (kcal) 1864    Estimated Kcal Range  1108-8842    Estimated/Assessed Protein  Needs    Weight Used for Protein Calculation 47.6 kg (105 lb)   IBW    Protein (gm/kg) 1.4    1.4 Gm Protein (gm) 66.68    Estimated Protein Range 57-67    Estimated/Assessed Fluid Needs    Fluid Need Method RDA method    RDA Method (mL)  1500            Nutrition Prescription Ordered       01/11/18 1444    Nutrition Prescription PO    Common Modifiers Consistent Carbohydrate            Evaluation of Received Nutrient/Fluid Intake       01/11/18 1445    PO Evaluation    Number of Days PO Intake Evaluated Insufficient Data      Problem/Interventions:        Problem 1       01/11/18 1447    Nutrition Diagnoses Problem 1    Problem 1 Increased Nutrient Needs    Macronutrient Protein    Etiology (related to) Medical Diagnosis    Skin Pressure ulcer              Intervention Goal       01/11/18 1447    Intervention Goal    General Maintain nutrition;Reduce/improve symptoms;Meet nutritional needs for age/condition;Disease management/therapy    PO Tolerate PO;PO intake (%)    PO Intake % 75 %    Weight No significant weight loss            Nutrition Intervention       01/11/18 1447    Nutrition Intervention    RD/Tech Action Care plan reviewd;Follow Tx progress   pt not willing to engage in conversation              Education/Evaluation       01/11/18 1448    Education    Education Will Instruct as appropriate    Monitor/Evaluation    Monitor Per protocol;Symptoms;I&O;PO intake;Pertinent labs;Weight;Skin status        Electronically signed by:  Tianna Flores RD  01/11/18 2:48 PM

## 2018-01-12 LAB
ANION GAP SERPL CALCULATED.3IONS-SCNC: 12.4 MMOL/L
BACTERIA SPEC AEROBE CULT: ABNORMAL
BACTERIA SPEC AEROBE CULT: ABNORMAL
BASOPHILS # BLD AUTO: 0.02 10*3/MM3 (ref 0–0.2)
BASOPHILS NFR BLD AUTO: 0.3 % (ref 0–1.5)
BUN BLD-MCNC: 12 MG/DL (ref 8–23)
BUN/CREAT SERPL: 12.8 (ref 7–25)
CALCIUM SPEC-SCNC: 8.4 MG/DL (ref 8.6–10.5)
CHLORIDE SERPL-SCNC: 106 MMOL/L (ref 98–107)
CO2 SERPL-SCNC: 24.6 MMOL/L (ref 22–29)
CREAT BLD-MCNC: 0.94 MG/DL (ref 0.57–1)
DEPRECATED RDW RBC AUTO: 43.4 FL (ref 37–54)
EOSINOPHIL # BLD AUTO: 0.22 10*3/MM3 (ref 0–0.7)
EOSINOPHIL NFR BLD AUTO: 3.3 % (ref 0.3–6.2)
ERYTHROCYTE [DISTWIDTH] IN BLOOD BY AUTOMATED COUNT: 15.2 % (ref 11.7–13)
GFR SERPL CREATININE-BSD FRML MDRD: 57 ML/MIN/1.73
GLUCOSE BLD-MCNC: 126 MG/DL (ref 65–99)
GLUCOSE BLDC GLUCOMTR-MCNC: 134 MG/DL (ref 70–130)
GLUCOSE BLDC GLUCOMTR-MCNC: 160 MG/DL (ref 70–130)
GLUCOSE BLDC GLUCOMTR-MCNC: 235 MG/DL (ref 70–130)
GLUCOSE BLDC GLUCOMTR-MCNC: 306 MG/DL (ref 70–130)
HCT VFR BLD AUTO: 26.8 % (ref 35.6–45.5)
HGB BLD-MCNC: 8.1 G/DL (ref 11.9–15.5)
IMM GRANULOCYTES # BLD: 0.02 10*3/MM3 (ref 0–0.03)
IMM GRANULOCYTES NFR BLD: 0.3 % (ref 0–0.5)
LYMPHOCYTES # BLD AUTO: 2.15 10*3/MM3 (ref 0.9–4.8)
LYMPHOCYTES NFR BLD AUTO: 32.1 % (ref 19.6–45.3)
MCH RBC QN AUTO: 24.1 PG (ref 26.9–32)
MCHC RBC AUTO-ENTMCNC: 30.2 G/DL (ref 32.4–36.3)
MCV RBC AUTO: 79.8 FL (ref 80.5–98.2)
MONOCYTES # BLD AUTO: 0.68 10*3/MM3 (ref 0.2–1.2)
MONOCYTES NFR BLD AUTO: 10.2 % (ref 5–12)
NEUTROPHILS # BLD AUTO: 3.6 10*3/MM3 (ref 1.9–8.1)
NEUTROPHILS NFR BLD AUTO: 53.8 % (ref 42.7–76)
PLATELET # BLD AUTO: 285 10*3/MM3 (ref 140–500)
PMV BLD AUTO: 10.6 FL (ref 6–12)
POTASSIUM BLD-SCNC: 3.3 MMOL/L (ref 3.5–5.2)
RBC # BLD AUTO: 3.36 10*6/MM3 (ref 3.9–5.2)
SODIUM BLD-SCNC: 143 MMOL/L (ref 136–145)
WBC NRBC COR # BLD: 6.69 10*3/MM3 (ref 4.5–10.7)

## 2018-01-12 PROCEDURE — 85025 COMPLETE CBC W/AUTO DIFF WBC: CPT | Performed by: NURSE PRACTITIONER

## 2018-01-12 PROCEDURE — 25010000002 CEFTRIAXONE PER 250 MG: Performed by: INTERNAL MEDICINE

## 2018-01-12 PROCEDURE — 82962 GLUCOSE BLOOD TEST: CPT

## 2018-01-12 PROCEDURE — 63710000001 INSULIN DETEMER PER 5 UNITS: Performed by: INTERNAL MEDICINE

## 2018-01-12 PROCEDURE — 80048 BASIC METABOLIC PNL TOTAL CA: CPT | Performed by: NURSE PRACTITIONER

## 2018-01-12 PROCEDURE — 63710000001 INSULIN REGULAR HUMAN PER 5 UNITS: Performed by: INTERNAL MEDICINE

## 2018-01-12 RX ADMIN — MULTIPLE VITAMINS W/ MINERALS TAB 1 TABLET: TAB at 09:11

## 2018-01-12 RX ADMIN — FUROSEMIDE 40 MG: 40 TABLET ORAL at 09:12

## 2018-01-12 RX ADMIN — HUMAN INSULIN 8 UNITS: 100 INJECTION, SOLUTION SUBCUTANEOUS at 18:15

## 2018-01-12 RX ADMIN — HUMAN INSULIN 8 UNITS: 100 INJECTION, SOLUTION SUBCUTANEOUS at 13:07

## 2018-01-12 RX ADMIN — CEFTRIAXONE 1 G: 1 INJECTION, POWDER, FOR SOLUTION INTRAMUSCULAR; INTRAVENOUS at 23:58

## 2018-01-12 RX ADMIN — FUROSEMIDE 40 MG: 40 TABLET ORAL at 20:48

## 2018-01-12 RX ADMIN — HYDROCODONE BITARTRATE AND ACETAMINOPHEN 1 TABLET: 5; 325 TABLET ORAL at 19:39

## 2018-01-12 RX ADMIN — ATORVASTATIN CALCIUM 40 MG: 20 TABLET, FILM COATED ORAL at 09:12

## 2018-01-12 RX ADMIN — HYDRALAZINE HYDROCHLORIDE 50 MG: 50 TABLET, FILM COATED ORAL at 13:10

## 2018-01-12 RX ADMIN — AMMONIUM LACTATE: 120 CREAM TOPICAL at 09:12

## 2018-01-12 RX ADMIN — LABETALOL HCL 100 MG: 100 TABLET, FILM COATED ORAL at 09:11

## 2018-01-12 RX ADMIN — LABETALOL HCL 100 MG: 100 TABLET, FILM COATED ORAL at 20:48

## 2018-01-12 RX ADMIN — LISINOPRIL 40 MG: 40 TABLET ORAL at 09:11

## 2018-01-12 RX ADMIN — POTASSIUM CHLORIDE 20 MEQ: 750 CAPSULE, EXTENDED RELEASE ORAL at 09:12

## 2018-01-12 RX ADMIN — HYDRALAZINE HYDROCHLORIDE 50 MG: 50 TABLET, FILM COATED ORAL at 06:54

## 2018-01-12 RX ADMIN — RISPERIDONE 1 MG: 0.5 TABLET, ORALLY DISINTEGRATING ORAL at 20:48

## 2018-01-12 RX ADMIN — APIXABAN 5 MG: 5 TABLET, FILM COATED ORAL at 09:12

## 2018-01-12 RX ADMIN — APIXABAN 5 MG: 5 TABLET, FILM COATED ORAL at 20:48

## 2018-01-12 RX ADMIN — CLONIDINE 1 PATCH: 0.3 PATCH TRANSDERMAL at 09:11

## 2018-01-12 RX ADMIN — ISOSORBIDE MONONITRATE 30 MG: 30 TABLET ORAL at 09:12

## 2018-01-12 RX ADMIN — FUROSEMIDE 40 MG: 40 TABLET ORAL at 18:14

## 2018-01-12 NOTE — PROGRESS NOTES
Continued Stay Note  [facility]     Patient Name: Britney Deras  MRN: [id number]  Today's Date: 1/12/2018    Admit Date: 1/9/2018          Discharge Plan       01/12/18 1149    Case Management/Social Work Plan    Plan [facility] level of care    Patient/Family In Agreement With Plan yes    Additional Comments Spoke with guardian , Mirela Kramer ( [phone], [phone]).  She confirms that patient will return to [facility] at [place].  Transfer packet in [facility].  CCP will follow.               Discharge Codes     None            Cydney Miller RN

## 2018-01-12 NOTE — PROGRESS NOTES
Continued Stay Note  [facility]     Patient Name: Britney Deras  MRN: [id number]  Today's Date: 1/12/2018    Admit Date: 1/9/2018          Discharge Plan       01/12/18 1242    Case Management/Social Work Plan    Additional Comments DNR status has been approved and approval letter is in patient chart. Nurse, gela Valentine. Cydney Miller RN      01/12/18 1148    Case Management/Social Work Plan    Plan [facility] level of care    Patient/Family In Agreement With Plan yes    Additional Comments Spoke with guardian , Mirela Kramer ( [phone], [phone]).  She confirms that patient will return to [facility] [place].  [facility]  CCP will follow.               Discharge Codes     None            Cydney Miller RN

## 2018-01-12 NOTE — SIGNIFICANT NOTE
01/12/18 1449   Rehab Treatment   Discipline physical therapist   Treatment Not Performed patient/family declined treatment  (Pt sitting up in chair, refusing PT right now, declining gait, standing/sitting exercises or getting back in bed.  Pt reports not having PT at [facility] and 'doesn't want it.'  Plan to attempt PT at later date - Discussed w/ PAIGE Valentine. )   Recommendation   PT - Next Appointment 01/16/18

## 2018-01-12 NOTE — PROGRESS NOTES
"DAILY PROGRESS NOTE  [facility], [facility]    2018    Patient Identification:  Name: Britney Deras  Age: 82 y.o.  Sex: female  :  1935  MRN: [id number]           Primary Care Physician: Gustavo Mcgrath MD    Subjective:    Interval History:    On  IV antibiotics for UTI.  Rocephin, day 3  Argumentative, but Cooperating with staff to this time  Request for DNR status with state guardian in process  Urine cx with E coli. Hbg 8.1    ROS:   Denies CP, SOA, N/V/D.     Objective:    Scheduled Meds:    ammonium lactate  Topical BID   apixaban 5 mg Oral Q12H   atorvastatin 40 mg Oral Daily   ceftriaxone 1 g Intravenous Q24H   CloNIDine 1 patch Transdermal Weekly   furosemide 40 mg Oral TID   hydrALAZINE 50 mg Oral Q8H   insulin detemir 12 Units Subcutaneous Daily   insulin regular 8 Units Subcutaneous TID With Meals   ipratropium-albuterol 3 mL Nebulization 4x Daily - RT   isosorbide mononitrate 30 mg Oral Q24H   labetalol 100 mg Oral Q12H   lisinopril 40 mg Oral Daily   multivitamin with minerals 1 tablet Oral Daily   potassium chloride 40 mEq Oral Daily   risperiDONE 1 mg Oral Nightly       Continuous Infusions:       PRN Meds:  •  acetaminophen  •  dextrose  •  dextrose  •  glucagon (human recombinant)  •  HYDROcodone-acetaminophen  •  HYDROcodone-acetaminophen  •  nitroglycerin  •  sodium chloride    Intake/Output:  No intake or output data in the 24 hours ending 18 1208      Exam:    tMax 24 hrs: Temp (24hrs), Av.2 °F (36.8 °C), Min:98.1 °F (36.7 °C), Max:98.3 °F (36.8 °C)    Vitals Ranges:   Temp:  [98.1 °F (36.7 °C)-98.3 °F (36.8 °C)] 98.3 °F (36.8 °C)  Heart Rate:  [60-71] 60  Resp:  [16-18] 17  BP: (120-157)/(50-83) 157/57    /57 (BP Location: Right arm, Patient Position: Sitting)  Pulse 60  Temp 98.3 °F (36.8 °C) (Oral)   Resp 17  Ht 154.9 cm (61\")  Wt 94.7 kg (208 lb 11.2 oz)  SpO2 100%  BMI 39.43 kg/m2    General: Alert, " cooperative, no distress, appears stated age  Neck: Supple, symmetrical, trachea midline, no adenopathy;              thyroid:  no enlargement/tenderness/nodules;              no carotid bruit or JVD  Cardiovascular: Normal rate, regular rhythm and intact distal pulses.              Exam reveals no gallop and no friction rub. No murmur heard  Pulmonary: Clear to auscultation bilaterally, respirations unlabored.               No rhonchi, wheezing or rales.   Abdominal: Soft. Soft, non-tender, bowel sounds active all four quadrants,     no masses, no hepatomegaly, no splenomegaly.   Extremities: Normal, atraumatic, no cyanosis. Trace edema  Neurological: Patient is alert and oriented to person, place, and time.                 CNII-XII intact, normal strength, sensation intact throughout  Skin: Unstagable wounds bilateral heels.       Data Review:      Results from last 7 days  Lab Units 01/12/18  0714 01/11/18  0626 01/10/18  0630   WBC 10*3/mm3 6.69 6.91 8.21   HEMOGLOBIN g/dL 8.1* 7.8* 8.6*   HEMATOCRIT % 26.8* 25.8* 28.3*   PLATELETS 10*3/mm3 285 279 273         Results from last 7 days  Lab Units 01/12/18  0714 01/11/18  0626 01/10/18  0630 01/09/18  1828   SODIUM mmol/L 143 143 139 139   POTASSIUM mmol/L 3.3* 3.2* 3.6 4.2   CHLORIDE mmol/L 106 107 102 99   CO2 mmol/L 24.6 22.4 23.5 25.0   BUN mg/dL 12 17 27* 27*   CREATININE mg/dL 0.94 0.96 1.16* 1.30*   CALCIUM mg/dL 8.4* 7.9* 8.3* 9.3   BILIRUBIN mg/dL  --   --   --  0.3   ALK PHOS U/L  --   --   --  134*   ALT (SGPT) U/L  --   --   --  34*   AST (SGOT) U/L  --   --   --  34*   GLUCOSE mg/dL 126* 97 120* 205*       Estimated Creatinine Clearance: 48.5 mL/min (by C-G formula based on Cr of 0.94).      Results from last 7 days  Lab Units 01/09/18  2257   INR  1.67*         Lab Results  Lab Value Date/Time   TROPONINT 0.022 01/10/2018 0630   TROPONINT 0.022 01/09/2018 1828   TROPONINT 0.021 04/20/2017 2134   TROPONINT 0.024 04/20/2017 1710   TROPONINT <0.010  03/24/2017 0517   TROPONINT 0.264 (C) 02/15/2017 0527   TROPONINT 0.424 (C) 02/14/2017 0415   TROPONINT 0.261 (C) 02/13/2017 1311       Microbiology Results (last 10 days)     Procedure Component Value - Date/Time    Urine Culture - Urine, Urine, Clean Catch [[id number]]  (Abnormal)  (Susceptibility) Collected:  01/09/18 2310    Lab Status:  Final result Specimen:  Urine from Urine, Catheter Updated:  01/12/18 1122     Urine Culture --      >100,000 CFU/mL Escherichia coli (A)    Susceptibility      Escherichia coli     ELODIA     Ampicillin <=2 ug/ml Susceptible     Ampicillin + Sulbactam <=2 ug/ml Susceptible     Cefazolin <=4 ug/ml Susceptible     Cefepime <=1 ug/ml Susceptible     Ceftriaxone <=1 ug/ml Susceptible     Ciprofloxacin 1 ug/ml Susceptible     Ertapenem <=0.5 ug/ml Susceptible     Gentamicin <=1 ug/ml Susceptible     Levofloxacin 1 ug/ml Susceptible     Nitrofurantoin <=16 ug/ml Susceptible     Piperacillin + Tazobactam <=4 ug/ml Susceptible     Tetracycline >=16 ug/ml Resistant     Trimethoprim + Sulfamethoxazole >=320 ug/ml Resistant                           Imaging Results (last 72 hours)     Procedure Component Value Units Date/Time    XR Chest 2 View [[id number]] Collected:  01/09/18 1649     Updated:  01/09/18 2032    Narrative:       EMERGENCY PA AND LATERAL CHEST X-RAY 01/09/2018     CLINICAL HISTORY: Shortness of breath, cough, weakness, dizziness,  altered mental status, history of asthma and diabetes, hypertension.     COMPARISON: This is correlated to a prior chest x-ray 03/23/2017.     FINDINGS: There is a right arm PICC line in place, distal tip projects  over the expected location of the right subclavian vein just inferior to  the right 2nd posterior rib. Cardiomediastinal silhouette is upper  limits of normal to mildly enlarged, pulmonary vasculature is within  normal limits. Lungs are clear, costophrenic angles are sharp. There are  clips in the right upper quadrant from previous  cholecystectomy.       Impression:          1. No active disease is seen in the chest. There is a right arm PICC  line in place, distal tip projects inferior to the right 2nd posterior  rib in the expected location of the right subclavian vein. There are  clips in the right upper quadrant from previous cholecystectomy.     This report was finalized on 1/9/2018 8:29 PM by Dr. Tai Velasquez MD.       CT Angiogram Chest With Contrast [[id number]] Collected:  01/10/18 0155     Updated:  01/10/18 0155    Narrative:       CT ANGIOGRAM CHEST WITH CONTRAST.     TECHNIQUE: Radiation dose reduction techniques were utilized, including  automated exposure control and exposure modulation based on body size. A  routine enhanced CTA of the chest was performed, tailored in order to  evaluate the pulmonary arteries. Sagittal and coronal two-dimensional  reformations are provided for review. Three-dimensional reformations are  also provided.     HISTORY: Chest pain, DVT.     COMPARISON: No prior studies for comparison.     FINDINGS: Limited study due to motion.     No occlusive filling defects are seen in the main pulmonary artery or  its first-degree branches to suggest acute pulmonary embolism.         There is no mediastinal lymphadenopathy or pericardial effusion.         No consolidation or effusion.          Post cholecystectomy. 1 cm cyst of the left kidney. Small hiatal hernia.       Impression:       1.  Limited study due to motion.  2.  No evidence for acute pulmonary embolism.                      Assessment:    Active Problems:    Chest pain    Coronary artery disease    Paranoid schizophrenia    Chronic kidney disease    Lymphedema    Acute deep vein thrombosis (DVT) of both lower extremities  UTI  TME    Patient Active Problem List   Diagnosis Code   • NSTEMI (non-ST elevated myocardial infarction) I21.4   • Diabetes mellitus E11.9   • Hyperlipidemia E78.5   • Hypertension I10   • Dementia F03.90   • Schizoaffective  disorder F25.9   • Chronic renal insufficiency, stage III (moderate) N18.3   • Chronic diastolic (congestive) heart failure I50.32   • Lymphedema of both lower extremities I89.0   • Chest pain R07.9   • Coronary artery disease I25.10   • Paranoid schizophrenia F20.0   • Chronic kidney disease N18.9   • Lymphedema I89.0   • Acute deep vein thrombosis (DVT) of both lower extremities I82.403       Plan:    Continue IV antibiotics  No IV fluids  Continue home doses of lasix  Monitor and correct electrolytes  Adjust insulin as needed  Monitor mental status  Continue home medications  PT/OT/ST consulted  DVT/stress ulcer prophylaxis  Labs in am    This morning we receive the approval letter from state to change her CODE status to DNR.    Gustavo Mcgrath MD  1/12/2018  12:08 PM

## 2018-01-12 NOTE — PLAN OF CARE
Problem: Patient Care Overview (Adult)  Goal: Plan of Care Review  Outcome: Ongoing (interventions implemented as appropriate)   01/12/18 0546   Coping/Psychosocial Response Interventions   Plan Of Care Reviewed With patient   Patient Care Overview   Progress improving   Outcome Evaluation   Outcome Summary/Follow up Plan c/o L leg pain treated with pain medication, q 2 turn, d/c IV site due to symptomatic, cont to have PICC on R upper arm,; argumentative with turns and medications but able to redirect and cooperative after some encouagement. VSS, Will cont to monitor.      Goal: Adult Individualization and Mutuality  Outcome: Ongoing (interventions implemented as appropriate)    Goal: Discharge Needs Assessment  Outcome: Ongoing (interventions implemented as appropriate)      Problem: Pain, Acute (Adult)  Goal: Identify Related Risk Factors and Signs and Symptoms  Outcome: Ongoing (interventions implemented as appropriate)    Goal: Acceptable Pain Control/Comfort Level  Outcome: Ongoing (interventions implemented as appropriate)      Problem: Fall Risk (Adult)  Goal: Identify Related Risk Factors and Signs and Symptoms  Outcome: Ongoing (interventions implemented as appropriate)    Goal: Absence of Falls  Outcome: Ongoing (interventions implemented as appropriate)      Problem: Cardiac Output, Decreased (Adult)  Goal: Identify Related Risk Factors and Signs and Symptoms  Outcome: Ongoing (interventions implemented as appropriate)    Goal: Adequate Cardiac Output/Effective Tissue Perfusion  Outcome: Ongoing (interventions implemented as appropriate)      Problem: Pressure Ulcer (Adult)  Goal: Signs and Symptoms of Listed Potential Problems Will be Absent or Manageable (Pressure Ulcer)  Outcome: Ongoing (interventions implemented as appropriate)

## 2018-01-13 VITALS
SYSTOLIC BLOOD PRESSURE: 135 MMHG | RESPIRATION RATE: 16 BRPM | HEIGHT: 61 IN | TEMPERATURE: 98.7 F | BODY MASS INDEX: 39.4 KG/M2 | HEART RATE: 66 BPM | DIASTOLIC BLOOD PRESSURE: 53 MMHG | WEIGHT: 208.7 LBS | OXYGEN SATURATION: 98 %

## 2018-01-13 PROBLEM — F03.90 DEMENTIA (HCC): Status: RESOLVED | Noted: 2017-02-13 | Resolved: 2018-01-13

## 2018-01-13 PROBLEM — E87.6 HYPOKALEMIA: Status: ACTIVE | Noted: 2018-01-13

## 2018-01-13 PROBLEM — N39.0 COMPLICATED UTI (URINARY TRACT INFECTION): Status: ACTIVE | Noted: 2018-01-13

## 2018-01-13 PROBLEM — G92.8 TOXIC METABOLIC ENCEPHALOPATHY: Status: ACTIVE | Noted: 2018-01-13

## 2018-01-13 PROBLEM — N18.9 CHRONIC KIDNEY DISEASE: Chronic | Status: RESOLVED | Noted: 2018-01-10 | Resolved: 2018-01-13

## 2018-01-13 LAB
ANION GAP SERPL CALCULATED.3IONS-SCNC: 12.5 MMOL/L
BASOPHILS # BLD AUTO: 0.02 10*3/MM3 (ref 0–0.2)
BASOPHILS NFR BLD AUTO: 0.3 % (ref 0–1.5)
BUN BLD-MCNC: 12 MG/DL (ref 8–23)
BUN/CREAT SERPL: 13 (ref 7–25)
CALCIUM SPEC-SCNC: 8.5 MG/DL (ref 8.6–10.5)
CHLORIDE SERPL-SCNC: 105 MMOL/L (ref 98–107)
CO2 SERPL-SCNC: 25.5 MMOL/L (ref 22–29)
CREAT BLD-MCNC: 0.92 MG/DL (ref 0.57–1)
DEPRECATED RDW RBC AUTO: 44.1 FL (ref 37–54)
EOSINOPHIL # BLD AUTO: 0.25 10*3/MM3 (ref 0–0.7)
EOSINOPHIL NFR BLD AUTO: 4.1 % (ref 0.3–6.2)
ERYTHROCYTE [DISTWIDTH] IN BLOOD BY AUTOMATED COUNT: 15.3 % (ref 11.7–13)
GFR SERPL CREATININE-BSD FRML MDRD: 58 ML/MIN/1.73
GLUCOSE BLD-MCNC: 92 MG/DL (ref 65–99)
GLUCOSE BLDC GLUCOMTR-MCNC: 110 MG/DL (ref 70–130)
GLUCOSE BLDC GLUCOMTR-MCNC: 189 MG/DL (ref 70–130)
GLUCOSE BLDC GLUCOMTR-MCNC: 98 MG/DL (ref 70–130)
HCT VFR BLD AUTO: 26.6 % (ref 35.6–45.5)
HGB BLD-MCNC: 8 G/DL (ref 11.9–15.5)
IMM GRANULOCYTES # BLD: 0.02 10*3/MM3 (ref 0–0.03)
IMM GRANULOCYTES NFR BLD: 0.3 % (ref 0–0.5)
LYMPHOCYTES # BLD AUTO: 2.16 10*3/MM3 (ref 0.9–4.8)
LYMPHOCYTES NFR BLD AUTO: 35.3 % (ref 19.6–45.3)
MCH RBC QN AUTO: 24 PG (ref 26.9–32)
MCHC RBC AUTO-ENTMCNC: 30.1 G/DL (ref 32.4–36.3)
MCV RBC AUTO: 79.6 FL (ref 80.5–98.2)
MONOCYTES # BLD AUTO: 0.68 10*3/MM3 (ref 0.2–1.2)
MONOCYTES NFR BLD AUTO: 11.1 % (ref 5–12)
NEUTROPHILS # BLD AUTO: 2.99 10*3/MM3 (ref 1.9–8.1)
NEUTROPHILS NFR BLD AUTO: 48.9 % (ref 42.7–76)
PLATELET # BLD AUTO: 264 10*3/MM3 (ref 140–500)
PMV BLD AUTO: 10 FL (ref 6–12)
POTASSIUM BLD-SCNC: 3.4 MMOL/L (ref 3.5–5.2)
RBC # BLD AUTO: 3.34 10*6/MM3 (ref 3.9–5.2)
SODIUM BLD-SCNC: 143 MMOL/L (ref 136–145)
WBC NRBC COR # BLD: 6.12 10*3/MM3 (ref 4.5–10.7)

## 2018-01-13 PROCEDURE — 80048 BASIC METABOLIC PNL TOTAL CA: CPT | Performed by: NURSE PRACTITIONER

## 2018-01-13 PROCEDURE — 82962 GLUCOSE BLOOD TEST: CPT

## 2018-01-13 PROCEDURE — 63710000001 INSULIN REGULAR HUMAN PER 5 UNITS: Performed by: INTERNAL MEDICINE

## 2018-01-13 PROCEDURE — 85025 COMPLETE CBC W/AUTO DIFF WBC: CPT | Performed by: NURSE PRACTITIONER

## 2018-01-13 RX ORDER — HYDROCODONE BITARTRATE AND ACETAMINOPHEN 5; 325 MG/1; MG/1
1 TABLET ORAL EVERY 6 HOURS PRN
Qty: 120 TABLET | Refills: 0 | Status: ON HOLD | OUTPATIENT
Start: 2018-01-13 | End: 2018-12-05 | Stop reason: SDUPTHER

## 2018-01-13 RX ORDER — AMMONIUM LACTATE 120 MG/G
CREAM TOPICAL 2 TIMES DAILY
Qty: 100 G | Refills: 32 | Status: SHIPPED | OUTPATIENT
Start: 2018-01-13

## 2018-01-13 RX ADMIN — APIXABAN 5 MG: 5 TABLET, FILM COATED ORAL at 08:17

## 2018-01-13 RX ADMIN — AMMONIUM LACTATE: 120 CREAM TOPICAL at 08:19

## 2018-01-13 RX ADMIN — FUROSEMIDE 40 MG: 40 TABLET ORAL at 08:17

## 2018-01-13 RX ADMIN — HYDROCODONE BITARTRATE AND ACETAMINOPHEN 1 TABLET: 5; 325 TABLET ORAL at 08:32

## 2018-01-13 RX ADMIN — LABETALOL HCL 100 MG: 100 TABLET, FILM COATED ORAL at 08:17

## 2018-01-13 RX ADMIN — HYDROCODONE BITARTRATE AND ACETAMINOPHEN 1 TABLET: 5; 325 TABLET ORAL at 15:34

## 2018-01-13 RX ADMIN — ISOSORBIDE MONONITRATE 30 MG: 30 TABLET ORAL at 08:17

## 2018-01-13 RX ADMIN — HYDRALAZINE HYDROCHLORIDE 50 MG: 50 TABLET, FILM COATED ORAL at 15:55

## 2018-01-13 RX ADMIN — POTASSIUM CHLORIDE 40 MEQ: 750 CAPSULE, EXTENDED RELEASE ORAL at 08:18

## 2018-01-13 RX ADMIN — ATORVASTATIN CALCIUM 40 MG: 20 TABLET, FILM COATED ORAL at 08:17

## 2018-01-13 RX ADMIN — HUMAN INSULIN 8 UNITS: 100 INJECTION, SOLUTION SUBCUTANEOUS at 18:03

## 2018-01-13 RX ADMIN — HYDRALAZINE HYDROCHLORIDE 50 MG: 50 TABLET, FILM COATED ORAL at 07:45

## 2018-01-13 RX ADMIN — MULTIPLE VITAMINS W/ MINERALS TAB 1 TABLET: TAB at 08:18

## 2018-01-13 RX ADMIN — FUROSEMIDE 40 MG: 40 TABLET ORAL at 18:03

## 2018-01-13 RX ADMIN — LISINOPRIL 40 MG: 40 TABLET ORAL at 08:18

## 2018-01-13 NOTE — DISCHARGE SUMMARY
PHYSICIAN DISCHARGE SUMMARY  [facility], [facility]    Patient Identification:    Name: Britney Deras  Age: 82 y.o.  Sex: female  :  1935  MRN: [id number]    Primary Care Physician: Gustavo Mcgrath MD    Admit date: 2018    Discharge date and time:2018    Discharged Condition: fair    Discharge Diagnoses:  Principal Problem:    Complicated UTI (urinary tract infection)  Active Problems:    Chest pain    Acute deep vein thrombosis (DVT) of both lower extremities    Toxic metabolic encephalopathy    Hypokalemia    Chronic diastolic (congestive) heart failure    Coronary artery disease    Paranoid schizophrenia    Lymphedema    Patient Active Problem List   Diagnosis Code   • NSTEMI (non-ST elevated myocardial infarction) I21.4   • Diabetes mellitus E11.9   • Hyperlipidemia E78.5   • Hypertension I10   • Schizoaffective disorder F25.9   • Chronic renal insufficiency, stage III (moderate) N18.3   • Chronic diastolic (congestive) heart failure I50.32   • Lymphedema of both lower extremities I89.0   • Chest pain R07.9   • Coronary artery disease I25.10   • Paranoid schizophrenia F20.0   • Lymphedema I89.0   • Acute deep vein thrombosis (DVT) of both lower extremities I82.403   • Complicated UTI (urinary tract infection) N39.0   • Toxic metabolic encephalopathy G92   • Hypokalemia E87.6          Hospital Course: Britney Deras  is a 82 year old female resident of a NH with a prior medical history of Schizophrenia, CHF, CKD, HTN, DMII, HLD, and CAD who has been declining steadily for the past 2-3 months. She has had long history of refusal of medications, hospitalization, Bathing, and treatment. She has had significant lymphedema bilateral LE's, non- compliant with elevation and treatment. She was recently started on eliquis for bilateral LE DVT's. She has had intermittent CP for about 3 months, refusing hospitalization. She routinely refuses insulin and  refuses to allow psychiatry to assist her. About 10 days ago she started refusing all food and drink. She was pursuaded to allow blood draw and she was found to be in ARF. She agreed to start of IVF's, but not hospitalization. She refused bladder catheterization. She was doing a little better, but still refusing all food. Yesterday, she developed CP and was not able to catch her breath. She agreed to go to ED. There, her BNP was > 2000 and her urine had TNTC WBC's She is admitted for further evaluation and treatment. She was started on rocephin for presumptive UTI. This morning, she denies CP or SOA, but is becoming a little more agitated- so far able to be redirected.     Upon admission patient was placed on IV fluids, IV antibiotics were started for UTI. Electrolytes were monitored and corrected.  Accuchecks and sliding scale insulin were started.  At the nursing home, and due to her refusal of treatment and her continue to decline overall status, DNR was requested.  After starting treatment, patient started to improve some.  DNR was granted. Culture came positive for E. coli sensitive to cephalosporins.  Her volume status was okay. She is taking her medications.  At this time her vital signs are stable.  Patient feels OK.  She is okay to be discharged back to nursing home on Ceftin 250 mg twice a day ×4 more days.       PMHX:   Past Medical History:   Diagnosis Date   • Angina pectoris    • Angina pectoris    • Asthma    • Atherosclerosis    • Cellulitis    • CHF (congestive heart failure)    • Chronic kidney disease    • Coronary artery disease    • Dementia    • Depression    • Diabetes mellitus    • GERD (gastroesophageal reflux disease)    • Hyperlipidemia    • Hypertension    • Lymphedema    • NSTEMI (non-ST elevated myocardial infarction)    • Stroke    • TIA (transient ischemic attack)      PSHX:   Past Surgical History:   Procedure Laterality Date   • CHOLECYSTECTOMY     • HYSTERECTOMY     • PERIPHERALLY  "INSERTED CENTRAL CATHETER INSERTION             Consults:     Consults     Date and Time Order Name Status Description    1/9/2018 2247 Family Medicine Consult Completed           Discharge Exam:    /55 (BP Location: Left arm, Patient Position: Lying)  Pulse 62  Temp 98.2 °F (36.8 °C) (Oral)   Resp 18  Ht 154.9 cm (61\")  Wt 94.7 kg (208 lb 11.2 oz)  SpO2 97%  BMI 39.43 kg/m2    General: Alert, cooperative, no distress, appears stated age  Neck: Supple, symmetrical, trachea midline, no adenopathy;              thyroid:  no enlargement/tenderness/nodules;              no carotid bruit or JVD  Cardiovascular: Normal rate, regular rhythm and intact distal pulses.              Exam reveals no gallop and no friction rub. No murmur heard  Pulmonary: Clear to auscultation bilaterally, respirations unlabored.               No rhonchi, wheezing or rales.   Abdominal: Soft. Soft, non-tender, bowel sounds active all four quadrants,     no masses, no hepatomegaly, no splenomegaly.   Extremities: Normal, atraumatic, no cyanosis. Trace edema  Neurological: Patient is alert and oriented to person, place, and time.                 CNII-XII intact, normal strength, sensation intact throughout  Skin: Unstagable wounds bilateral heels.        Data Review:          Results from last 7 days  Lab Units 01/13/18  0742 01/12/18  0714 01/11/18  0626   WBC 10*3/mm3 6.12 6.69 6.91   HEMOGLOBIN g/dL 8.0* 8.1* 7.8*   HEMATOCRIT % 26.6* 26.8* 25.8*   PLATELETS 10*3/mm3 264 285 279         Results from last 7 days  Lab Units 01/13/18  0742 01/12/18  0714 01/11/18  0626  01/09/18  1828   SODIUM mmol/L 143 143 143  < > 139   POTASSIUM mmol/L 3.4* 3.3* 3.2*  < > 4.2   CHLORIDE mmol/L 105 106 107  < > 99   CO2 mmol/L 25.5 24.6 22.4  < > 25.0   BUN mg/dL 12 12 17  < > 27*   CREATININE mg/dL 0.92 0.94 0.96  < > 1.30*   CALCIUM mg/dL 8.5* 8.4* 7.9*  < > 9.3   BILIRUBIN mg/dL  --   --   --   --  0.3   ALK PHOS U/L  --   --   --   --  134* "   ALT (SGPT) U/L  --   --   --   --  34*   AST (SGOT) U/L  --   --   --   --  34*   GLUCOSE mg/dL 92 126* 97  < > 205*   < > = values in this interval not displayed.    Results from last 7 days  Lab Units 01/09/18  2257   INR  1.67*         Lab Results  Lab Value Date/Time   TROPONINT 0.022 01/10/2018 0630   TROPONINT 0.022 01/09/2018 1828   TROPONINT 0.021 04/20/2017 2134   TROPONINT 0.024 04/20/2017 1710   TROPONINT <0.010 03/24/2017 0517   TROPONINT 0.264 (C) 02/15/2017 0527   TROPONINT 0.424 (C) 02/14/2017 0415   TROPONINT 0.261 (C) 02/13/2017 1311       Microbiology Results (last 10 days)     Procedure Component Value - Date/Time    Urine Culture - Urine, Urine, Clean Catch [[id number]]  (Abnormal)  (Susceptibility) Collected:  01/09/18 2310    Lab Status:  Final result Specimen:  Urine from Urine, Catheter Updated:  01/12/18 1122     Urine Culture --      >100,000 CFU/mL Escherichia coli (A)    Susceptibility      Escherichia coli     ELODIA     Ampicillin <=2 ug/ml Susceptible     Ampicillin + Sulbactam <=2 ug/ml Susceptible     Cefazolin <=4 ug/ml Susceptible     Cefepime <=1 ug/ml Susceptible     Ceftriaxone <=1 ug/ml Susceptible     Ciprofloxacin 1 ug/ml Susceptible     Ertapenem <=0.5 ug/ml Susceptible     Gentamicin <=1 ug/ml Susceptible     Levofloxacin 1 ug/ml Susceptible     Nitrofurantoin <=16 ug/ml Susceptible     Piperacillin + Tazobactam <=4 ug/ml Susceptible     Tetracycline >=16 ug/ml Resistant     Trimethoprim + Sulfamethoxazole >=320 ug/ml Resistant                           Imaging Results (all)     Procedure Component Value Units Date/Time    XR Chest 2 View [[id number]] Collected:  01/09/18 1649     Updated:  01/09/18 2032    Narrative:       EMERGENCY PA AND LATERAL CHEST X-RAY 01/09/2018     CLINICAL HISTORY: Shortness of breath, cough, weakness, dizziness,  altered mental status, history of asthma and diabetes, hypertension.     COMPARISON: This is correlated to a prior chest x-ray  03/23/2017.     FINDINGS: There is a right arm PICC line in place, distal tip projects  over the expected location of the right subclavian vein just inferior to  the right 2nd posterior rib. Cardiomediastinal silhouette is upper  limits of normal to mildly enlarged, pulmonary vasculature is within  normal limits. Lungs are clear, costophrenic angles are sharp. There are  clips in the right upper quadrant from previous cholecystectomy.       Impression:          1. No active disease is seen in the chest. There is a right arm PICC  line in place, distal tip projects inferior to the right 2nd posterior  rib in the expected location of the right subclavian vein. There are  clips in the right upper quadrant from previous cholecystectomy.     This report was finalized on 1/9/2018 8:29 PM by Dr. Tai Velasquez MD.       CT Angiogram Chest With Contrast [[id number]] Collected:  01/10/18 0155     Updated:  01/12/18 2142    Narrative:       CT ANGIOGRAM CHEST WITH CONTRAST.     TECHNIQUE: Radiation dose reduction techniques were utilized, including  automated exposure control and exposure modulation based on body size. A  routine enhanced CTA of the chest was performed, tailored in order to  evaluate the pulmonary arteries. Sagittal and coronal two-dimensional  reformations are provided for review. Three-dimensional reformations are  also provided.     HISTORY: Chest pain, DVT.     COMPARISON: No prior studies for comparison.     FINDINGS: Limited study due to motion.     No occlusive filling defects are seen in the main pulmonary artery or  its first-degree branches to suggest acute pulmonary embolism.         There is no mediastinal lymphadenopathy or pericardial effusion.         No consolidation or effusion.          Post cholecystectomy. 1 cm cyst of the left kidney. Small hiatal hernia.       Impression:       1.  Limited study due to motion.  2.  No evidence for acute pulmonary embolism.         This report was finalized  on 1/12/2018 9:39 PM by Dr. Mazin Driscoll MD.               Disposition:    Skilled nursing facility    Patient Instructions:      Britney Deras   Home Medication Instructions FRITZ:[id number]    Printed on:01/13/18 1142   Medication Information                      acetaminophen (TYLENOL) 325 MG tablet  Take 650 mg by mouth Every 4 (Four) Hours As Needed for mild pain (1-3).             ammonium lactate (AMLACTIN) 12 % cream  Apply  topically 2 (Two) Times a Day.             apixaban (ELIQUIS) 5 MG tablet tablet  Take 5 mg by mouth 2 (Two) Times a Day.             atorvastatin (LIPITOR) 40 MG tablet  Take 1 tablet by mouth Daily.             cholecalciferol (VITAMIN D3) 1000 units tablet  Take 1,000 Units by mouth Daily.             CloNIDine (CATAPRES-TTS) 0.3 MG/24HR patch  Place 1 patch on the skin 1 (One) Time Per Week. Friday             furosemide (LASIX) 40 MG tablet  Take 40 mg by mouth 3 (Three) Times a Day.             hydrALAZINE (APRESOLINE) 100 MG tablet  Take 1 tablet by mouth Every 8 (Eight) Hours.             HYDROcodone-acetaminophen (NORCO) 5-325 MG per tablet  Take 1 tablet by mouth Every 6 (Six) Hours As Needed for Moderate Pain  or Severe Pain .             insulin aspart (novoLOG) 100 UNIT/ML injection  Inject 10 Units under the skin Every Morning Before Breakfast.             insulin aspart (novoLOG) 100 UNIT/ML injection  Inject 8 Units under the skin Daily Before Lunch.             insulin aspart (novoLOG) 100 UNIT/ML injection  Inject 10 Units under the skin Daily Before Supper.             insulin detemir (LEVEMIR) 100 UNIT/ML injection  Inject 12 Units under the skin Every Morning.             ipratropium-albuterol (DUO-NEB) 0.5-2.5 mg/mL nebulizer  Take 3 mL by nebulization 4 (Four) Times a Day.             isosorbide mononitrate (IMDUR) 30 MG 24 hr tablet  Take 1 tablet by mouth Every 12 (Twelve) Hours.             labetalol (NORMODYNE) 100 MG tablet  Take 100 mg by mouth 2  (Two) Times a Day.             lisinopril (PRINIVIL,ZESTRIL) 40 MG tablet  Take 40 mg by mouth Daily.             Multiple Vitamins-Minerals (MULTIVITAMIN & MINERAL PO)  Take 1 tablet by mouth Daily.             nitroglycerin (NITROSTAT) 0.4 MG SL tablet  Place 0.4 mg under the tongue Every 5 (Five) Minutes As Needed for chest pain. Take no more than 3 doses in 15 minutes.             potassium chloride (K-DUR,KLOR-CON) 20 MEQ CR tablet  Take 40 mEq by mouth Daily.             risperiDONE (risperDAL M-TABS) 1 MG disintegrating tablet  Take 1 tablet by mouth Every Night.                 Discharge Order     Start     Ordered    01/13/18 1134  Discharge patient  Once     Expected Discharge Date:  01/13/18    Discharge Disposition:  Skilled Nursing Facility (DC - External)        01/13/18 1134          Follow-up Information     Follow up with [facility] .    Specialties:  Assisted Living Facility, Skilled Nursing Facility, Intermediate Care Facility    Contact information:    [address] [facility] [address] [phone]        Follow up with Gustavo Mcgrath MD .    Specialties:  Internal Medicine, Hospitalist    Contact information:    [address] [address]  [phone]            Total time spent discharging patient including evaluation,post hospitalization follow up,  medication and post hospitalization instructions and education total time exceeds 30 minutes.    Signed:  Gustavo Mcgrath MD  1/13/2018  11:42 AM

## 2018-01-13 NOTE — PLAN OF CARE
Problem: Patient Care Overview (Adult)  Goal: Plan of Care Review  Outcome: Ongoing (interventions implemented as appropriate)   01/13/18 1322   Coping/Psychosocial Response Interventions   Plan Of Care Reviewed With patient   Patient Care Overview   Progress no change   Outcome Evaluation   Outcome Summary/Follow up Plan Pt will d/c to [facility] by ambulance today.        Problem: Pain, Acute (Adult)  Goal: Acceptable Pain Control/Comfort Level  Outcome: Ongoing (interventions implemented as appropriate)      Problem: Fall Risk (Adult)  Goal: Absence of Falls  Outcome: Ongoing (interventions implemented as appropriate)      Problem: Cardiac Output, Decreased (Adult)  Goal: Adequate Cardiac Output/Effective Tissue Perfusion  Outcome: Ongoing (interventions implemented as appropriate)      Problem: Pressure Ulcer (Adult)  Goal: Signs and Symptoms of Listed Potential Problems Will be Absent or Manageable (Pressure Ulcer)  Outcome: Ongoing (interventions implemented as appropriate)

## 2018-01-13 NOTE — PLAN OF CARE
Problem: Patient Care Overview (Adult)  Goal: Plan of Care Review  Outcome: Ongoing (interventions implemented as appropriate)   01/13/18 0434   Coping/Psychosocial Response Interventions   Plan Of Care Reviewed With patient   Patient Care Overview   Progress no change   Outcome Evaluation   Outcome Summary/Follow up Plan lower extremity ulcers. Pt had taken most of her meds this shift. A/Ox4. Incontinent of bladder. PICC in right upper arm.

## 2018-01-15 NOTE — PROGRESS NOTES
Case Management Discharge Note    Final Note: Patient was DC'd to IC level bed at [facility] 1/14.    Discharge Placement     Facility/Agency Request Status Selected? Address Phone Number Fax Number    [facility] Accepted    Yes [address], [facility] [address] [phone]        Ambulance: Yellow    Discharge Codes: 04  Discharged/transferred to intermediate care facility (ICF)

## 2018-11-30 ENCOUNTER — APPOINTMENT (OUTPATIENT)
Dept: GENERAL RADIOLOGY | Facility: HOSPITAL | Age: 83
End: 2018-11-30

## 2018-11-30 ENCOUNTER — HOSPITAL ENCOUNTER (INPATIENT)
Facility: HOSPITAL | Age: 83
LOS: 4 days | Discharge: NURSING, REHAB OR LONG-TERM CARE | End: 2018-12-05
Attending: EMERGENCY MEDICINE | Admitting: INTERNAL MEDICINE

## 2018-11-30 DIAGNOSIS — R77.8 ELEVATED TROPONIN: ICD-10-CM

## 2018-11-30 DIAGNOSIS — D64.9 ANEMIA, UNSPECIFIED TYPE: ICD-10-CM

## 2018-11-30 DIAGNOSIS — I50.9 ACUTE ON CHRONIC CONGESTIVE HEART FAILURE, UNSPECIFIED HEART FAILURE TYPE (HCC): Primary | ICD-10-CM

## 2018-11-30 DIAGNOSIS — R06.02 SHORTNESS OF BREATH: ICD-10-CM

## 2018-11-30 DIAGNOSIS — R53.1 GENERALIZED WEAKNESS: ICD-10-CM

## 2018-11-30 PROCEDURE — 85025 COMPLETE CBC W/AUTO DIFF WBC: CPT | Performed by: PHYSICIAN ASSISTANT

## 2018-11-30 PROCEDURE — 87040 BLOOD CULTURE FOR BACTERIA: CPT | Performed by: PHYSICIAN ASSISTANT

## 2018-11-30 PROCEDURE — 83735 ASSAY OF MAGNESIUM: CPT | Performed by: PHYSICIAN ASSISTANT

## 2018-11-30 PROCEDURE — 94640 AIRWAY INHALATION TREATMENT: CPT

## 2018-11-30 PROCEDURE — 93005 ELECTROCARDIOGRAM TRACING: CPT

## 2018-11-30 PROCEDURE — 93005 ELECTROCARDIOGRAM TRACING: CPT | Performed by: EMERGENCY MEDICINE

## 2018-11-30 PROCEDURE — 80053 COMPREHEN METABOLIC PANEL: CPT | Performed by: PHYSICIAN ASSISTANT

## 2018-11-30 PROCEDURE — 83605 ASSAY OF LACTIC ACID: CPT | Performed by: PHYSICIAN ASSISTANT

## 2018-11-30 PROCEDURE — 71045 X-RAY EXAM CHEST 1 VIEW: CPT

## 2018-11-30 PROCEDURE — 84484 ASSAY OF TROPONIN QUANT: CPT | Performed by: PHYSICIAN ASSISTANT

## 2018-11-30 PROCEDURE — 84145 PROCALCITONIN (PCT): CPT | Performed by: PHYSICIAN ASSISTANT

## 2018-11-30 PROCEDURE — 93010 ELECTROCARDIOGRAM REPORT: CPT | Performed by: INTERNAL MEDICINE

## 2018-11-30 PROCEDURE — 36415 COLL VENOUS BLD VENIPUNCTURE: CPT

## 2018-11-30 PROCEDURE — 83880 ASSAY OF NATRIURETIC PEPTIDE: CPT | Performed by: PHYSICIAN ASSISTANT

## 2018-11-30 PROCEDURE — 93005 ELECTROCARDIOGRAM TRACING: CPT | Performed by: PHYSICIAN ASSISTANT

## 2018-11-30 PROCEDURE — 99285 EMERGENCY DEPT VISIT HI MDM: CPT

## 2018-11-30 RX ORDER — SODIUM CHLORIDE 0.9 % (FLUSH) 0.9 %
10 SYRINGE (ML) INJECTION AS NEEDED
Status: DISCONTINUED | OUTPATIENT
Start: 2018-11-30 | End: 2018-12-05 | Stop reason: HOSPADM

## 2018-11-30 RX ORDER — IPRATROPIUM BROMIDE AND ALBUTEROL SULFATE 2.5; .5 MG/3ML; MG/3ML
3 SOLUTION RESPIRATORY (INHALATION) ONCE
Status: COMPLETED | OUTPATIENT
Start: 2018-11-30 | End: 2018-11-30

## 2018-11-30 RX ADMIN — IPRATROPIUM BROMIDE AND ALBUTEROL SULFATE 3 ML: 2.5; .5 SOLUTION RESPIRATORY (INHALATION) at 23:47

## 2018-12-01 PROBLEM — I50.9 ACUTE ON CHRONIC CONGESTIVE HEART FAILURE (HCC): Status: ACTIVE | Noted: 2018-12-01

## 2018-12-01 LAB
ALBUMIN SERPL-MCNC: 4 G/DL (ref 3.5–5.2)
ALBUMIN/GLOB SERPL: 1.3 G/DL
ALP SERPL-CCNC: 105 U/L (ref 39–117)
ALT SERPL W P-5'-P-CCNC: 20 U/L (ref 1–33)
ANION GAP SERPL CALCULATED.3IONS-SCNC: 8.7 MMOL/L
AST SERPL-CCNC: 23 U/L (ref 1–32)
B PERT DNA SPEC QL NAA+PROBE: NOT DETECTED
BACTERIA UR QL AUTO: ABNORMAL /HPF
BASOPHILS # BLD AUTO: 0.01 10*3/MM3 (ref 0–0.2)
BASOPHILS NFR BLD AUTO: 0.1 % (ref 0–1.5)
BILIRUB SERPL-MCNC: 0.4 MG/DL (ref 0.1–1.2)
BILIRUB UR QL STRIP: NEGATIVE
BUN BLD-MCNC: 15 MG/DL (ref 8–23)
BUN/CREAT SERPL: 16.5 (ref 7–25)
C PNEUM DNA NPH QL NAA+NON-PROBE: NOT DETECTED
CALCIUM SPEC-SCNC: 9.7 MG/DL (ref 8.6–10.5)
CHLORIDE SERPL-SCNC: 105 MMOL/L (ref 98–107)
CLARITY UR: ABNORMAL
CO2 SERPL-SCNC: 26.3 MMOL/L (ref 22–29)
COLOR UR: YELLOW
CREAT BLD-MCNC: 0.91 MG/DL (ref 0.57–1)
D-LACTATE SERPL-SCNC: 0.8 MMOL/L (ref 0.5–2)
DEPRECATED RDW RBC AUTO: 47.3 FL (ref 37–54)
EOSINOPHIL # BLD AUTO: 0.03 10*3/MM3 (ref 0–0.7)
EOSINOPHIL NFR BLD AUTO: 0.3 % (ref 0.3–6.2)
ERYTHROCYTE [DISTWIDTH] IN BLOOD BY AUTOMATED COUNT: 16.3 % (ref 11.7–13)
FLUAV H1 2009 PAND RNA NPH QL NAA+PROBE: NOT DETECTED
FLUAV H1 HA GENE NPH QL NAA+PROBE: NOT DETECTED
FLUAV H3 RNA NPH QL NAA+PROBE: NOT DETECTED
FLUAV SUBTYP SPEC NAA+PROBE: NOT DETECTED
FLUBV RNA ISLT QL NAA+PROBE: NOT DETECTED
GFR SERPL CREATININE-BSD FRML MDRD: 59 ML/MIN/1.73
GLOBULIN UR ELPH-MCNC: 3.1 GM/DL
GLUCOSE BLD-MCNC: 187 MG/DL (ref 65–99)
GLUCOSE BLDC GLUCOMTR-MCNC: 141 MG/DL (ref 70–130)
GLUCOSE BLDC GLUCOMTR-MCNC: 141 MG/DL (ref 70–130)
GLUCOSE BLDC GLUCOMTR-MCNC: 212 MG/DL (ref 70–130)
GLUCOSE BLDC GLUCOMTR-MCNC: 245 MG/DL (ref 70–130)
GLUCOSE UR STRIP-MCNC: ABNORMAL MG/DL
HADV DNA SPEC NAA+PROBE: NOT DETECTED
HBA1C MFR BLD: 6.29 % (ref 4.8–5.6)
HCOV 229E RNA SPEC QL NAA+PROBE: NOT DETECTED
HCOV HKU1 RNA SPEC QL NAA+PROBE: NOT DETECTED
HCOV NL63 RNA SPEC QL NAA+PROBE: NOT DETECTED
HCOV OC43 RNA SPEC QL NAA+PROBE: NOT DETECTED
HCT VFR BLD AUTO: 30.1 % (ref 35.6–45.5)
HGB BLD-MCNC: 8.8 G/DL (ref 11.9–15.5)
HGB UR QL STRIP.AUTO: NEGATIVE
HMPV RNA NPH QL NAA+NON-PROBE: NOT DETECTED
HOLD SPECIMEN: NORMAL
HPIV1 RNA SPEC QL NAA+PROBE: NOT DETECTED
HPIV2 RNA SPEC QL NAA+PROBE: NOT DETECTED
HPIV3 RNA NPH QL NAA+PROBE: NOT DETECTED
HPIV4 P GENE NPH QL NAA+PROBE: NOT DETECTED
HYALINE CASTS UR QL AUTO: ABNORMAL /LPF
IMM GRANULOCYTES # BLD: 0 10*3/MM3 (ref 0–0.03)
IMM GRANULOCYTES NFR BLD: 0 % (ref 0–0.5)
KETONES UR QL STRIP: NEGATIVE
LEUKOCYTE ESTERASE UR QL STRIP.AUTO: ABNORMAL
LYMPHOCYTES # BLD AUTO: 1.16 10*3/MM3 (ref 0.9–4.8)
LYMPHOCYTES NFR BLD AUTO: 12.2 % (ref 19.6–45.3)
M PNEUMO IGG SER IA-ACNC: NOT DETECTED
MAGNESIUM SERPL-MCNC: 2.1 MG/DL (ref 1.6–2.4)
MCH RBC QN AUTO: 23 PG (ref 26.9–32)
MCHC RBC AUTO-ENTMCNC: 29.2 G/DL (ref 32.4–36.3)
MCV RBC AUTO: 78.6 FL (ref 80.5–98.2)
MONOCYTES # BLD AUTO: 0.77 10*3/MM3 (ref 0.2–1.2)
MONOCYTES NFR BLD AUTO: 8.1 % (ref 5–12)
NEUTROPHILS # BLD AUTO: 7.54 10*3/MM3 (ref 1.9–8.1)
NEUTROPHILS NFR BLD AUTO: 79.3 % (ref 42.7–76)
NITRITE UR QL STRIP: NEGATIVE
NT-PROBNP SERPL-MCNC: 2589 PG/ML (ref 0–1800)
PH UR STRIP.AUTO: 7.5 [PH] (ref 5–8)
PLATELET # BLD AUTO: 254 10*3/MM3 (ref 140–500)
PMV BLD AUTO: 9.8 FL (ref 6–12)
POTASSIUM BLD-SCNC: 4.4 MMOL/L (ref 3.5–5.2)
PROCALCITONIN SERPL-MCNC: 0.15 NG/ML (ref 0.1–0.25)
PROT SERPL-MCNC: 7.1 G/DL (ref 6–8.5)
PROT UR QL STRIP: ABNORMAL
RBC # BLD AUTO: 3.83 10*6/MM3 (ref 3.9–5.2)
RBC # UR: ABNORMAL /HPF
REF LAB TEST METHOD: ABNORMAL
RHINOVIRUS RNA SPEC NAA+PROBE: NOT DETECTED
RSV RNA NPH QL NAA+NON-PROBE: NOT DETECTED
SODIUM BLD-SCNC: 140 MMOL/L (ref 136–145)
SP GR UR STRIP: 1.02 (ref 1–1.03)
SQUAMOUS #/AREA URNS HPF: ABNORMAL /HPF
TROPONIN T SERPL-MCNC: 0.07 NG/ML (ref 0–0.03)
TROPONIN T SERPL-MCNC: 0.13 NG/ML (ref 0–0.03)
UROBILINOGEN UR QL STRIP: ABNORMAL
WBC NRBC COR # BLD: 9.51 10*3/MM3 (ref 4.5–10.7)
WBC UR QL AUTO: ABNORMAL /HPF
WHOLE BLOOD HOLD SPECIMEN: NORMAL

## 2018-12-01 PROCEDURE — 87798 DETECT AGENT NOS DNA AMP: CPT | Performed by: PHYSICIAN ASSISTANT

## 2018-12-01 PROCEDURE — 83036 HEMOGLOBIN GLYCOSYLATED A1C: CPT | Performed by: INTERNAL MEDICINE

## 2018-12-01 PROCEDURE — 87086 URINE CULTURE/COLONY COUNT: CPT | Performed by: PHYSICIAN ASSISTANT

## 2018-12-01 PROCEDURE — 87186 SC STD MICRODIL/AGAR DIL: CPT | Performed by: PHYSICIAN ASSISTANT

## 2018-12-01 PROCEDURE — 25010000002 HYDRALAZINE PER 20 MG: Performed by: INTERNAL MEDICINE

## 2018-12-01 PROCEDURE — 92610 EVALUATE SWALLOWING FUNCTION: CPT

## 2018-12-01 PROCEDURE — 93010 ELECTROCARDIOGRAM REPORT: CPT | Performed by: INTERNAL MEDICINE

## 2018-12-01 PROCEDURE — 25010000002 FUROSEMIDE PER 20 MG: Performed by: PHYSICIAN ASSISTANT

## 2018-12-01 PROCEDURE — 87581 M.PNEUMON DNA AMP PROBE: CPT | Performed by: PHYSICIAN ASSISTANT

## 2018-12-01 PROCEDURE — 87633 RESP VIRUS 12-25 TARGETS: CPT | Performed by: PHYSICIAN ASSISTANT

## 2018-12-01 PROCEDURE — 87486 CHLMYD PNEUM DNA AMP PROBE: CPT | Performed by: PHYSICIAN ASSISTANT

## 2018-12-01 PROCEDURE — 99223 1ST HOSP IP/OBS HIGH 75: CPT | Performed by: INTERNAL MEDICINE

## 2018-12-01 PROCEDURE — 82962 GLUCOSE BLOOD TEST: CPT

## 2018-12-01 PROCEDURE — 81001 URINALYSIS AUTO W/SCOPE: CPT | Performed by: PHYSICIAN ASSISTANT

## 2018-12-01 PROCEDURE — 93005 ELECTROCARDIOGRAM TRACING: CPT | Performed by: INTERNAL MEDICINE

## 2018-12-01 PROCEDURE — 94799 UNLISTED PULMONARY SVC/PX: CPT

## 2018-12-01 PROCEDURE — 63710000001 INSULIN LISPRO (HUMAN) PER 5 UNITS: Performed by: INTERNAL MEDICINE

## 2018-12-01 PROCEDURE — 94640 AIRWAY INHALATION TREATMENT: CPT

## 2018-12-01 PROCEDURE — 25010000002 FUROSEMIDE PER 20 MG: Performed by: INTERNAL MEDICINE

## 2018-12-01 PROCEDURE — 84484 ASSAY OF TROPONIN QUANT: CPT | Performed by: INTERNAL MEDICINE

## 2018-12-01 PROCEDURE — 87088 URINE BACTERIA CULTURE: CPT | Performed by: PHYSICIAN ASSISTANT

## 2018-12-01 RX ORDER — FUROSEMIDE 10 MG/ML
40 INJECTION INTRAMUSCULAR; INTRAVENOUS EVERY 8 HOURS
Status: DISCONTINUED | OUTPATIENT
Start: 2018-12-01 | End: 2018-12-02

## 2018-12-01 RX ORDER — NICOTINE POLACRILEX 4 MG
15 LOZENGE BUCCAL
Status: DISCONTINUED | OUTPATIENT
Start: 2018-12-01 | End: 2018-12-05 | Stop reason: HOSPADM

## 2018-12-01 RX ORDER — NITROGLYCERIN 0.4 MG/1
0.4 TABLET SUBLINGUAL
Status: DISCONTINUED | OUTPATIENT
Start: 2018-12-01 | End: 2018-12-05 | Stop reason: HOSPADM

## 2018-12-01 RX ORDER — IPRATROPIUM BROMIDE AND ALBUTEROL SULFATE 2.5; .5 MG/3ML; MG/3ML
3 SOLUTION RESPIRATORY (INHALATION)
Status: DISCONTINUED | OUTPATIENT
Start: 2018-12-01 | End: 2018-12-05 | Stop reason: HOSPADM

## 2018-12-01 RX ORDER — DEXTROSE MONOHYDRATE 25 G/50ML
25 INJECTION, SOLUTION INTRAVENOUS
Status: DISCONTINUED | OUTPATIENT
Start: 2018-12-01 | End: 2018-12-05 | Stop reason: HOSPADM

## 2018-12-01 RX ORDER — FUROSEMIDE 10 MG/ML
40 INJECTION INTRAMUSCULAR; INTRAVENOUS ONCE
Status: COMPLETED | OUTPATIENT
Start: 2018-12-01 | End: 2018-12-01

## 2018-12-01 RX ORDER — ASPIRIN 81 MG/1
81 TABLET ORAL DAILY
Status: DISCONTINUED | OUTPATIENT
Start: 2018-12-01 | End: 2018-12-05 | Stop reason: HOSPADM

## 2018-12-01 RX ORDER — LISINOPRIL 40 MG/1
40 TABLET ORAL DAILY
Status: DISCONTINUED | OUTPATIENT
Start: 2018-12-01 | End: 2018-12-05 | Stop reason: HOSPADM

## 2018-12-01 RX ORDER — AMLODIPINE BESYLATE 10 MG/1
10 TABLET ORAL ONCE
Status: COMPLETED | OUTPATIENT
Start: 2018-12-01 | End: 2018-12-01

## 2018-12-01 RX ORDER — ACETAMINOPHEN 325 MG/1
650 TABLET ORAL EVERY 4 HOURS PRN
Status: DISCONTINUED | OUTPATIENT
Start: 2018-12-01 | End: 2018-12-05 | Stop reason: HOSPADM

## 2018-12-01 RX ORDER — HYDROCODONE BITARTRATE AND ACETAMINOPHEN 5; 325 MG/1; MG/1
1 TABLET ORAL EVERY 6 HOURS PRN
Status: DISCONTINUED | OUTPATIENT
Start: 2018-12-01 | End: 2018-12-05 | Stop reason: HOSPADM

## 2018-12-01 RX ORDER — CLONIDINE 0.3 MG/24H
1 PATCH, EXTENDED RELEASE TRANSDERMAL WEEKLY
Status: DISCONTINUED | OUTPATIENT
Start: 2018-12-01 | End: 2018-12-05 | Stop reason: HOSPADM

## 2018-12-01 RX ORDER — ATORVASTATIN CALCIUM 20 MG/1
40 TABLET, FILM COATED ORAL DAILY
Status: DISCONTINUED | OUTPATIENT
Start: 2018-12-01 | End: 2018-12-05 | Stop reason: HOSPADM

## 2018-12-01 RX ORDER — HYDRALAZINE HYDROCHLORIDE 20 MG/ML
10 INJECTION INTRAMUSCULAR; INTRAVENOUS EVERY 6 HOURS PRN
Status: DISCONTINUED | OUTPATIENT
Start: 2018-12-01 | End: 2018-12-05 | Stop reason: HOSPADM

## 2018-12-01 RX ORDER — CARVEDILOL 3.12 MG/1
3.12 TABLET ORAL EVERY 12 HOURS SCHEDULED
Status: DISCONTINUED | OUTPATIENT
Start: 2018-12-01 | End: 2018-12-02

## 2018-12-01 RX ORDER — ISOSORBIDE MONONITRATE 60 MG/1
60 TABLET, EXTENDED RELEASE ORAL
Status: DISCONTINUED | OUTPATIENT
Start: 2018-12-01 | End: 2018-12-05 | Stop reason: HOSPADM

## 2018-12-01 RX ADMIN — HYDROCODONE BITARTRATE AND ACETAMINOPHEN 1 TABLET: 5; 325 TABLET ORAL at 16:38

## 2018-12-01 RX ADMIN — IPRATROPIUM BROMIDE AND ALBUTEROL SULFATE 3 ML: 2.5; .5 SOLUTION RESPIRATORY (INHALATION) at 16:51

## 2018-12-01 RX ADMIN — LISINOPRIL 40 MG: 40 TABLET ORAL at 11:58

## 2018-12-01 RX ADMIN — CARVEDILOL 3.12 MG: 3.12 TABLET, FILM COATED ORAL at 22:53

## 2018-12-01 RX ADMIN — HYDRALAZINE HYDROCHLORIDE 75 MG: 50 TABLET, FILM COATED ORAL at 21:50

## 2018-12-01 RX ADMIN — FUROSEMIDE 40 MG: 10 INJECTION, SOLUTION INTRAMUSCULAR; INTRAVENOUS at 12:00

## 2018-12-01 RX ADMIN — FUROSEMIDE 40 MG: 10 INJECTION, SOLUTION INTRAMUSCULAR; INTRAVENOUS at 16:37

## 2018-12-01 RX ADMIN — FUROSEMIDE 40 MG: 10 INJECTION, SOLUTION INTRAMUSCULAR; INTRAVENOUS at 00:37

## 2018-12-01 RX ADMIN — HYDROCODONE BITARTRATE AND ACETAMINOPHEN 1 TABLET: 5; 325 TABLET ORAL at 22:55

## 2018-12-01 RX ADMIN — ISOSORBIDE MONONITRATE 60 MG: 60 TABLET ORAL at 11:59

## 2018-12-01 RX ADMIN — APIXABAN 5 MG: 5 TABLET, FILM COATED ORAL at 21:50

## 2018-12-01 RX ADMIN — INSULIN LISPRO 4 UNITS: 100 INJECTION, SOLUTION INTRAVENOUS; SUBCUTANEOUS at 12:26

## 2018-12-01 RX ADMIN — INSULIN LISPRO 4 UNITS: 100 INJECTION, SOLUTION INTRAVENOUS; SUBCUTANEOUS at 21:49

## 2018-12-01 RX ADMIN — IPRATROPIUM BROMIDE AND ALBUTEROL SULFATE 3 ML: 2.5; .5 SOLUTION RESPIRATORY (INHALATION) at 22:37

## 2018-12-01 RX ADMIN — AMLODIPINE BESYLATE 10 MG: 10 TABLET ORAL at 08:46

## 2018-12-01 RX ADMIN — IPRATROPIUM BROMIDE AND ALBUTEROL SULFATE 3 ML: 2.5; .5 SOLUTION RESPIRATORY (INHALATION) at 12:18

## 2018-12-01 RX ADMIN — ASPIRIN 81 MG: 81 TABLET, DELAYED RELEASE ORAL at 22:53

## 2018-12-01 RX ADMIN — HYDRALAZINE HYDROCHLORIDE 10 MG: 20 INJECTION INTRAMUSCULAR; INTRAVENOUS at 12:00

## 2018-12-01 RX ADMIN — APIXABAN 5 MG: 5 TABLET, FILM COATED ORAL at 11:58

## 2018-12-01 RX ADMIN — ATORVASTATIN CALCIUM 40 MG: 20 TABLET, FILM COATED ORAL at 11:58

## 2018-12-01 RX ADMIN — CLONIDINE 1 PATCH: 0.3 PATCH TRANSDERMAL at 12:03

## 2018-12-01 NOTE — PLAN OF CARE
Problem: Patient Care Overview  Goal: Plan of Care Review   12/01/18 1123   Coping/Psychosocial   Plan of Care Reviewed With patient   Plan of Care Review   Progress no change   OTHER   Outcome Summary No overt signs of aspiration with thin liquids and regular diet, though slight voice change with first sip of thin liquids. In light of right lower lobe infiltrates, will need to complete VFSS to rule out silent aspiration

## 2018-12-01 NOTE — SIGNIFICANT NOTE
12/01/18 1618   Rehab Time/Intention   Evaluation Not Performed patient/family declined evaluation  (Pt states she doesn't feel well, asked PT to follow up tomorrow. RN, Jackei, aware. )   Rehab Treatment   Discipline physical therapist   Recommendation   PT - Next Appointment 12/02/18

## 2018-12-01 NOTE — ED TRIAGE NOTES
"To ER via EMS from [facility] for SOA.  Per NH pt was 71% RA.  Pt has productive cough, yellow sputum x \"a couple weeks\".  Pt O2 sat 100% NC 2 L  "

## 2018-12-01 NOTE — H&P
HISTORY AND PHYSICAL   [facility]        Patient Identification:  Name: Britney Deras  Age: 83 y.o.  Sex: female  :  1935  MRN: [id number]                     Primary Care Physician: Gustavo Mcgrath MD    Chief Complaint:  83 year old female who was sent to the emergency room from a nursing homewith shortness of breath and chest pain; she has also had a productive cough; feels better now and denies chest pain; no family is present; history is limited due to dementia    History of Present Illness:   As above    Past Medical History:  Past Medical History:   Diagnosis Date   • Angina pectoris (CMS/[facility])    • Angina pectoris (CMS/[facility])    • Arthritis    • Asthma    • Atherosclerosis    • Cellulitis    • CHF (congestive heart failure) (CMS/[facility])    • Chronic kidney disease    • Coronary artery disease    • Dementia    • Depression    • Diabetes mellitus (CMS/[facility])    • GERD (gastroesophageal reflux disease)    • History of transfusion    • Hyperlipidemia    • Hypertension    • Lymphedema    • NSTEMI (non-ST elevated myocardial infarction) (CMS/[facility])    • Stroke (CMS/[facility])    • TIA (transient ischemic attack)      Past Surgical History:  Past Surgical History:   Procedure Laterality Date   • ABDOMINAL SURGERY     • CHOLECYSTECTOMY     • COLONOSCOPY     • EYE SURGERY     • HYSTERECTOMY     • PERIPHERALLY INSERTED CENTRAL CATHETER INSERTION        Home Meds:  Medications Prior to Admission   Medication Sig Dispense Refill Last Dose   • acetaminophen (TYLENOL) 325 MG tablet Take 650 mg by mouth Every 4 (Four) Hours As Needed for mild pain (1-3).   Unknown at Unknown time   • ammonium lactate (AMLACTIN) 12 % cream Apply  topically 2 (Two) Times a Day. 100 g 32    • apixaban (ELIQUIS) 5 MG tablet tablet Take 5 mg by mouth 2 (Two) Times a Day.      • atorvastatin (LIPITOR) 40 MG tablet Take 1 tablet by mouth Daily. 30 tablet 0 3/22/2017 at Unknown time   • cholecalciferol (VITAMIN D3) 1000 units tablet  Take 1,000 Units by mouth Daily.      • CloNIDine (CATAPRES-TTS) 0.3 MG/24HR patch Place 1 patch on the skin 1 (One) Time Per Week. Friday      • furosemide (LASIX) 40 MG tablet Take 40 mg by mouth 3 (Three) Times a Day.   3/23/2017 at Unknown time   • hydrALAZINE (APRESOLINE) 100 MG tablet Take 1 tablet by mouth Every 8 (Eight) Hours. 90 tablet 2    • HYDROcodone-acetaminophen (NORCO) 5-325 MG per tablet Take 1 tablet by mouth Every 6 (Six) Hours As Needed for Moderate Pain  or Severe Pain . 120 tablet 0    • insulin aspart (novoLOG) 100 UNIT/ML injection Inject 10 Units under the skin Every Morning Before Breakfast.   3/23/2017 at Unknown time   • insulin aspart (novoLOG) 100 UNIT/ML injection Inject 8 Units under the skin Daily Before Lunch.   3/23/2017 at Unknown time   • insulin aspart (novoLOG) 100 UNIT/ML injection Inject 10 Units under the skin Daily Before Supper.   3/22/2017 at Unknown time   • insulin detemir (LEVEMIR) 100 UNIT/ML injection Inject 12 Units under the skin Every Morning.   3/22/2017 at Unknown time   • ipratropium-albuterol (DUO-NEB) 0.5-2.5 mg/mL nebulizer Take 3 mL by nebulization 4 (Four) Times a Day.   3/23/2017 at Unknown time   • isosorbide mononitrate (IMDUR) 30 MG 24 hr tablet Take 1 tablet by mouth Every 12 (Twelve) Hours. 60 tablet 0 3/23/2017 at Unknown time   • labetalol (NORMODYNE) 100 MG tablet Take 100 mg by mouth 2 (Two) Times a Day.      • lisinopril (PRINIVIL,ZESTRIL) 40 MG tablet Take 40 mg by mouth Daily.   3/23/2017 at Unknown time   • Multiple Vitamins-Minerals (MULTIVITAMIN & MINERAL PO) Take 1 tablet by mouth Daily.      • nitroglycerin (NITROSTAT) 0.4 MG SL tablet Place 0.4 mg under the tongue Every 5 (Five) Minutes As Needed for chest pain. Take no more than 3 doses in 15 minutes.   Past Week at Unknown time   • potassium chloride (K-DUR,KLOR-CON) 20 MEQ CR tablet Take 40 mEq by mouth Daily.   3/23/2017 at Unknown time   • risperiDONE (risperDAL M-TABS) 1 MG  disintegrating tablet Take 1 tablet by mouth Every Night. 30 tablet 0 3/23/2017 at Unknown time       Allergies:  Allergies   Allergen Reactions   • Methyldopa    • Sulfa Antibiotics      Immunizations:    There is no immunization history on file for this patient.  Social History:   Social History     Social History Narrative   • Not on file     Social History     Socioeconomic History   • Marital status: Single     Spouse name: Not on file   • Number of children: Not on file   • Years of education: Not on file   • Highest education level: Not on file   Social Needs   • Financial resource strain: Not on file   • Food insecurity - worry: Not on file   • Food insecurity - inability: Not on file   • Transportation needs - medical: Not on file   • Transportation needs - non-medical: Not on file   Occupational History   • Not on file   Tobacco Use   • Smoking status: Former Smoker     Packs/day: 2.00     Years: 32.00     Pack years: 64.00     Types: Cigarettes     Start date:      Last attempt to quit:      Years since quittin.9   • Smokeless tobacco: Never Used   Substance and Sexual Activity   • Alcohol use: No   • Drug use: No   • Sexual activity: Defer     Birth control/protection: None   Other Topics Concern   • Not on file   Social History Narrative   • Not on file       Family History:  Family History   Problem Relation Age of Onset   • Hypertension Mother    • Diabetes Mother    • Parkinsonism Father    • Hypertension Brother    • Alcohol abuse Brother         Review of Systems  Cannot obtain due to dementia  Objective:  tMax 24 hrs: Temp (24hrs), Av.8 °F (37.1 °C), Min:98.2 °F (36.8 °C), Max:99.7 °F (37.6 °C)    Vitals Ranges:   Temp:  [98.2 °F (36.8 °C)-99.7 °F (37.6 °C)] 98.5 °F (36.9 °C)  Heart Rate:  [80-91] 82  Resp:  [18-20] 20  BP: (154-220)/() 157/55      Exam:  /55 (BP Location: Right arm, Patient Position: Lying)   Pulse 82   Temp 98.5 °F (36.9 °C) (Oral)   Resp 20   Ht  "162.6 cm (64\")   Wt 84.5 kg (186 lb 4.6 oz)   LMP  (LMP Unknown)   SpO2 95%   Breastfeeding? No   BMI 31.98 kg/m²     General Appearance:    Alert, cooperative, no distress, appears stated age; chronically ill-appearing   Head:    Normocephalic, without obvious abnormality, atraumatic   Eyes:    PERRL, conjunctiva/corneas clear, EOM's intact, both eyes   Ears:    Normal external ear canals, both ears   Nose:   Nares normal, septum midline, mucosa normal, no drainage    or sinus tenderness   Throat:   Lips, mucosa, and tongue normal   Neck:   Supple, symmetrical, trachea midline, no adenopathy;     thyroid:  no enlargement/tenderness/nodules; no carotid    bruit or JVD   Back:     Symmetric, no curvature, ROM normal, no CVA tenderness   Lungs:     decreaesd breath sounds bilaterally, respirations unlabored   Chest Wall:    No tenderness or deformity    Heart:    Regular rate and rhythm, S1 and S2 normal, no murmur, rub   or gallop   Abdomen:     Soft, non-tender, bowel sounds active all four quadrants,     no masses, no hepatomegaly, no splenomegaly   Extremities:   Extremities normal, atraumatic, no cyanosis or edema   Pulses:   2+ and symmetric all extremities   Skin:   Skin color, texture, turgor normal, no rashes or lesions     .    Data Review:  Lab Results   Component Value Date    WBC 9.51 11/30/2018    HGB 8.8 (L) 11/30/2018    HCT 30.1 (L) 11/30/2018     11/30/2018     Lab Results   Component Value Date     11/30/2018    K 4.4 11/30/2018     11/30/2018    CO2 26.3 11/30/2018    BUN 15 11/30/2018    CREATININE 0.91 11/30/2018    GLUCOSE 187 (H) 11/30/2018     Lab Results   Component Value Date    CALCIUM 9.7 11/30/2018    MG 2.1 11/30/2018     Lab Results   Component Value Date    AST 23 11/30/2018    ALT 20 11/30/2018    ALKPHOS 105 11/30/2018     No results found for: APTT, INR        Results from last 7 days   Lab Units  12/01/18   0907   HEMOGLOBIN A1C %  6.29*      Imaging Results " (all)     Procedure Component Value Units Date/Time    XR Chest 1 View [[id number]] Collected:  11/30/18 2338     Updated:  11/30/18 2342    Narrative:       PORTABLE CHEST RADIOGRAPH     HISTORY: Chest pain and shortness of breath     COMPARISON: January 9, 2018     FINDINGS:  Cardiomegaly is identified. Patient does appear to have vascular  congestion. There is also consolidation at the right lung base, which  may reflect either atelectasis or infiltrate. No pneumothorax is seen.  There are bilateral pleural effusions. There is some mild left basilar  atelectasis.       Impression:          1. Cardiomegaly and vascular congestion.  2. Consolidation at the right lung base may reflect atelectasis or  infiltrate. Bilateral pleural effusions are noted.     This report was finalized on 11/30/2018 11:39 PM by Dr. Dipika Lyn M.D.           Patient Active Problem List   Diagnosis Code   • NSTEMI (non-ST elevated myocardial infarction) (CMS/[facility]) I21.4   • Diabetes mellitus (CMS/HCC) E11.9   • Hyperlipidemia E78.5   • Hypertension I10   • Schizoaffective disorder (CMS/[facility]) F25.9   • Chronic renal insufficiency, stage III (moderate) (CMS/[facility]) N18.3   • Chronic diastolic (congestive) heart failure I50.32   • Lymphedema of both lower extremities I89.0   • Chest pain R07.9   • Coronary artery disease I25.10   • Paranoid schizophrenia (CMS/[facility]) F20.0   • Lymphedema I89.0   • Acute deep vein thrombosis (DVT) of both lower extremities (CMS/[facility]) I82.403   • Complicated UTI (urinary tract infection) N39.0   • Toxic metabolic encephalopathy G92   • Hypokalemia E87.6   • Acute on chronic congestive heart failure (CMS/[facility]) I50.9       Assessment:    Acute on chronic diastolic congestive heart failure (CMS/[facility])  Hypertensive urgency   Schizophrenia  Diabetes  cad  Anemia  Elevated troponin  Plan:  Will ninoskae  Ask cardiology to see her   Blood sugar is stable  Blood pressure is high but last reading was better  [place] eventually  [place]  High risk  Lyssa Ross MD  12/1/2018  1:24 PM

## 2018-12-01 NOTE — ED NOTES
Respiratory made aware of ipratropium-albuterol nebulizer solution.      Jenny Augustin  11/30/18 2551

## 2018-12-01 NOTE — ED PROVIDER NOTES
MD ATTESTATION NOTE    The SAWYER and I have discussed this patient's history, physical exam, and treatment plan.  I have reviewed the documentation and personally had a face to face interaction with the patient. I affirm the documentation and agree with the treatment and plan.  The attached note describes my personal findings.      Pt with h/o CHF presents to the ED c/o dyspnea onset earlier tonight. Pt has also had chest pain that worsens with palpation of the chest wall. Pt denies abdominal pain.    On physical exam, pt has wheezes present bilaterally. Anterior chest wall tenderness. 1+ BLE edema.    Pt's CXR shows vascular congestion and pt's BNP is 2,589 -> Lasix ordered. Pt's troponin is 0.069. Pt was admitted to Dr. Mcgrath, PMD, for further evaluation and treatment/management.             Documentation assistance provided by Ping Hughes. Information recorded by the scribe was done at my direction and has been verified and validated by me.     Entered by Ping Hughes, acting as scribe for Dr. Stu MD.           Ping Hughes  12/01/18 0212       Levon Peraza MD  12/01/18 0219

## 2018-12-01 NOTE — DISCHARGE PLACEMENT REQUEST
"Britney Deras (83 y.o. Female)     Date of Birth Social Security Number Address Home Phone MRN    1935  [id number] [facility] [address] [phone] [id number]    Druze Marital Status          Caodaism Single       Admission Date Admission Type Admitting Provider Attending Provider Department, Room/Bed    11/30/18 Emergency Gustavo Mcgrath MD [place], Marlon R, MD [address], [address]    Discharge Date Discharge Disposition Discharge Destination                       Attending Provider:  Gustavo Mcgrath MD    Allergies:  Methyldopa, Sulfa Antibiotics    Isolation:  None   Infection:  None   Code Status:  Prior    Ht:  162.6 cm (64\")   Wt:  84.5 kg (186 lb 4.6 oz)    Admission Cmt:  None   Principal Problem:  None                Active Insurance as of 11/30/2018     Primary Coverage     Payor Plan Insurance Group Employer/Plan Group    MEDICARE MEDICARE A & B      Payor Plan Address Payor Plan Phone Number Payor Plan Fax Number Effective Dates    [address] [phone]  3/1/1989 - None Entered    [facility] [id number]       Subscriber Name Subscriber Birth Date Member ID       BRITNEY DERAS 1935 [id number]           Secondary Coverage     Payor Plan Insurance Group Employer/Plan Group    [place]      Payor Plan Address Payor Plan Phone Number Payor Plan Fax Number Effective Dates    [address] [phone]  2/13/2017 - None Entered    [facility] [id number]       Subscriber Name Subscriber Birth Date Member ID       BRITNEY DERAS 1935 [id number]                 Emergency Contacts      (Rel.) Home Phone Work Phone Mobile Phone    Mirela Kramer (Guardian) -- [phone] --              "

## 2018-12-01 NOTE — THERAPY EVALUATION
Acute Care - Speech Language Pathology   Swallow Initial Evaluation [facility]     Patient Name: Britney Deras  : 1935  MRN: [id number]  Today's Date: 2018               Admit Date: 2018    Visit Dx:     ICD-10-CM ICD-9-CM   1. Acute on chronic congestive heart failure, unspecified heart failure type (CMS/HCC) I50.9 428.0   2. Shortness of breath R06.02 786.05   3. Anemia, unspecified type D64.9 285.9   4. Elevated troponin R74.8 790.6     Patient Active Problem List   Diagnosis   • NSTEMI (non-ST elevated myocardial infarction) (CMS/[facility])   • Diabetes mellitus (CMS/[facility])   • Hyperlipidemia   • Hypertension   • Schizoaffective disorder (CMS/[facility])   • Chronic renal insufficiency, stage III (moderate) (CMS/[facility])   • Chronic diastolic (congestive) heart failure   • Lymphedema of both lower extremities   • Chest pain   • Coronary artery disease   • Paranoid schizophrenia (CMS/[facility])   • Lymphedema   • Acute deep vein thrombosis (DVT) of both lower extremities (CMS/[facility])   • Complicated UTI (urinary tract infection)   • Toxic metabolic encephalopathy   • Hypokalemia   • Acute on chronic congestive heart failure (CMS/HCC)     Past Medical History:   Diagnosis Date   • Angina pectoris (CMS/HCC)    • Angina pectoris (CMS/HCC)    • Arthritis    • Asthma    • Atherosclerosis    • Cellulitis    • CHF (congestive heart failure) (CMS/[facility])    • Chronic kidney disease    • Coronary artery disease    • Dementia    • Depression    • Diabetes mellitus (CMS/[facility])    • GERD (gastroesophageal reflux disease)    • History of transfusion    • Hyperlipidemia    • Hypertension    • Lymphedema    • NSTEMI (non-ST elevated myocardial infarction) (CMS/[facility])    • Stroke (CMS/[facility])    • TIA (transient ischemic attack)      Past Surgical History:   Procedure Laterality Date   • ABDOMINAL SURGERY     • CHOLECYSTECTOMY     • COLONOSCOPY     • EYE SURGERY     • HYSTERECTOMY     • PERIPHERALLY INSERTED CENTRAL CATHETER INSERTION           SWALLOW EVALUATION (last 72 hours)      SLP Adult Swallow Evaluation     Row Name 12/01/18 1100                   Rehab Evaluation    Document Type  evaluation  -MT        Subjective Information  no complaints  -MT        Patient Observations  obtunded;cooperative  -MT        Patient Effort  good  -MT           General Information    Patient Profile Reviewed  yes  -MT        Pertinent History Of Current Problem  CHF, patient reports pneumonia two months ago  -MT        Current Method of Nutrition  regular textures  -MT        Precautions/Limitations, Vision  WFL;for purposes of eval  -MT        Precautions/Limitations, Hearing  WFL;for purposes of eval  -MT        Prior Level of Function-Communication  cognitive-linguistic impairment suspected, memory  -MT        Prior Level of Function-Swallowing  no diet consistency restrictions  -MT        Plans/Goals Discussed with  patient  -MT        Barriers to Rehab  none identified  -MT        Patient's Goals for Discharge  patient did not state  -MT           Oral Motor and Function    Dentition Assessment  upper dentures/partial in place;lower dentures/partial in place  -MT        Secretion Management  WNL/WFL  -MT        Mucosal Quality  moist, healthy  -MT        Volitional Swallow  WFL  -MT        Volitional Cough  WFL  -MT           Oral Musculature and Cranial Nerve Assessment    Oral Motor General Assessment  generalized oral motor weakness;velar impairment;other (see comments) hypernasal intermittently  -MT        Velar Impairment, Detail, Cranial Nerves X, XI (Vagus and Accessory)  reduced velar strength;other (see comments) intermittently hypernasal  -MT        Vocal Impairment, Detail. Cranial Nerve X (Vagus)  other (see comments) hoarse  -MT           Clinical Swallow Eval    Oral Prep Phase  WFL  -MT        Oral Transit  WFL  -MT        Oral Residue  WFL  -MT        Pharyngeal Phase  WFL  -MT        Esophageal Phase  unremarkable  -MT        Clinical  Swallow Evaluation Summary  Patient with mild generalized weakness. She was intermittently hypernasal. Voice change after first sip of thin liquids but not identifiable as wet. Hoarse throughout. No over signs with thin, puree, mech soft or regular, though generally slow and reduced in strength. Patient has right lower lobe infiltrates, will need to rule out silent aspiration.  -MT           Clinical Impression    SLP Swallowing Diagnosis  functional oral phase;functional pharyngeal phase  -MT        Functional Impact  no impact on function;risk of aspiration/pneumonia  -MT        Rehab Potential/Prognosis, Swallowing  good, to achieve stated therapy goals  -MT        Swallow Criteria for Skilled Therapeutic Interventions Met  demonstrates skilled criteria  -MT           Recommendations    Therapy Frequency (Swallow)  PRN  -MT        Predicted Duration Therapy Intervention (Days)  until discharge  -MT        SLP Diet Recommendation  regular textures;thin liquids  -MT        Recommended Diagnostics  VFSS ([facility] [place])  -MT        Recommended Precautions and Strategies  upright posture during/after eating  -MT        SLP Rec. for Method of Medication Administration  meds whole;with thin liquids  -MT        Monitor for Signs of Aspiration  yes;notify SLP if any concerns  -MT        Anticipated Dischage Disposition  home  -MT          User Key  (r) = Recorded By, (t) = Taken By, (c) = Cosigned By    Initials Name Effective Dates    MT Mary Grubbs, MS CCC-SLP 06/08/18 -           EDUCATION  The patient has been educated in the following areas:   Dysphagia (Swallowing Impairment).    SLP Recommendation and Plan  SLP Swallowing Diagnosis: functional oral phase, functional pharyngeal phase  SLP Diet Recommendation: regular textures, thin liquids  Recommended Precautions and Strategies: upright posture during/after eating     Monitor for Signs of Aspiration: yes, notify SLP if any concerns  Recommended Diagnostics: VFSS  ([facility])  Swallow Criteria for Skilled Therapeutic Interventions Met: demonstrates skilled criteria  Anticipated Dischage Disposition: home  Rehab Potential/Prognosis, Swallowing: good, to achieve stated therapy goals  Therapy Frequency (Swallow): PRN  Predicted Duration Therapy Intervention (Days): until discharge    Plan of Care Reviewed With: patient  Plan of Care Review  Plan of Care Reviewed With: patient  Progress: no change  Outcome Summary: No overt signs of aspiration with thin liquids and regular diet, though slight voice change with first sip of thin liquids. In light of right lower lobe infiltrates, will need to complete VFSS to rule out silent aspiration         SLP Outcome Measures (last 72 hours)      SLP Outcome Measures     Row Name 12/01/18 1100             SLP Outcome Measures    Outcome Measure Used?  Adult NOMS  -MT         Adult FCM Scores    FCM Chosen  Swallowing  -MT      Swallowing FCM Score  7  -MT        User Key  (r) = Recorded By, (t) = Taken By, (c) = Cosigned By    Initials Name Effective Dates    Mary Jessica MS CCC-SLP 06/08/18 -            Time Calculation:   Time Calculation- SLP     Row Name 12/01/18 1124             Time Calculation- SLP    SLP Start Time  1100  -MT      SLP Stop Time  1130  -MT      SLP Time Calculation (min)  30 min  -MT      SLP Received On  12/01/18  -MT        User Key  (r) = Recorded By, (t) = Taken By, (c) = Cosigned By    Initials Name Provider Type    Mary Jessica MS CCC-SLP Speech and Language Pathologist          Therapy Charges for Today     Code Description Service Date Service Provider Modifiers Qty    [id number] HC ST EVAL ORAL PHARYNG SWALLOW 2 12/1/2018 Mary Grubbs MS CCC-SLP GN 1               Mary Grubbs MS CCC-NAILA  12/1/2018

## 2018-12-01 NOTE — CONSULTS
Date of Consultation: 18    Referral Provider: Dr. Ross     Reason for Consultation: NSTEMI, acute on chronic diastolic CHF, hypertensive urgency.    Encounter Provider: Rufus Alejandro MD    Group of Service: [facility]     Patient Name: Britney Deras    :1935    Chief complaint:  Dyspnea and chest pain    History of Present Illness:      Ms. Deras is an 83 year old woman with history of dementia, schizophrenia, type 2 diabetes, and hypertension. She was seen in consultation by Dr. Corcoran In 2017 for elevated troponin and hypertension. She had an echocardiogram which showed an EF of 56% with a large-sized, moderately severe are of ischemia located in the apex, inferior wall and lateral wall. Echocardiogram revealed an EF of 65% mild concentric hypertrophy, grade II diastolic dysfunction and moderately dilated LA. Her BP was controlled and she was treated with beta blocker, ACE inhibitor, and statin. She was readmitted in 2017 with CHF and UTI. She was diuresed and discharged back to nursing facility.     She was transported to ED on 18 for dyspnea associated with chest pain. The chest pain was constant. She was recently treated for pneumonia. She states that she participates in physical therapy and does not have chest pain with exercise. Upon arrival, /76 and HR 84; ekg showed sinus rhythm with diffuse ST depression. Labs: troponin 0.069, troponin 2589, BUN 15, Crea 0.91, Hgb 8.8, hct 30.1, and urine 4+ bacteria. CXR showed vascular congestion, consolidation at right lung base, and bilateral pleural effusions. She was admitted for diuresis. After admission, /90 with HR 80s. This morning, troponin 0.129. Currently, the patient is chest pain free. Her /55 with HR 82; sinus rhythm on monitor.     Cardiac Testing:  Myocardial Perfusion Stress Test  Interpretation Summary     · Left ventricular ejection fraction is normal (Calculated EF  = 56%).  · Myocardial perfusion imaging indicates a large-sized, moderately severe area of ischemia located in the apex, inferior wall and lateral wall.     Echocardiogram 2/14/17  Interpretation Summary     · All left ventricular wall segments contract normally.  · Left ventricular wall thickness is consistent with mild concentric hypertrophy.  · Left ventricular function is normal. Estimated EF = 65%.  · Left ventricular diastolic dysfunction (grade II) consistent with pseudonormalization.  · Left atrial cavity size is moderately dilated.       Past Medical History:   Diagnosis Date   • Angina pectoris (CMS/[facility])    • Angina pectoris (CMS/[facility])    • Arthritis    • Asthma    • Atherosclerosis    • Cellulitis    • CHF (congestive heart failure) (CMS/[facility])    • Chronic kidney disease    • Coronary artery disease    • Dementia    • Depression    • Diabetes mellitus (CMS/[facility])    • GERD (gastroesophageal reflux disease)    • History of transfusion    • Hyperlipidemia    • Hypertension    • Lymphedema    • NSTEMI (non-ST elevated myocardial infarction) (CMS/[facility])    • Stroke (CMS/[facility])    • TIA (transient ischemic attack)          Past Surgical History:   Procedure Laterality Date   • ABDOMINAL SURGERY     • CHOLECYSTECTOMY     • COLONOSCOPY     • EYE SURGERY     • HYSTERECTOMY     • PERIPHERALLY INSERTED CENTRAL CATHETER INSERTION           Allergies   Allergen Reactions   • Methyldopa    • Sulfa Antibiotics          No current facility-administered medications on file prior to encounter.      Current Outpatient Medications on File Prior to Encounter   Medication Sig Dispense Refill   • acetaminophen (TYLENOL) 325 MG tablet Take 650 mg by mouth Every 4 (Four) Hours As Needed for mild pain (1-3).     • ammonium lactate (AMLACTIN) 12 % cream Apply  topically 2 (Two) Times a Day. 100 g 32   • apixaban (ELIQUIS) 5 MG tablet tablet Take 5 mg by mouth 2 (Two) Times a Day.     • atorvastatin (LIPITOR) 40 MG tablet Take 1 tablet by  mouth Daily. 30 tablet 0   • cholecalciferol (VITAMIN D3) 1000 units tablet Take 1,000 Units by mouth Daily.     • CloNIDine (CATAPRES-TTS) 0.3 MG/24HR patch Place 1 patch on the skin 1 (One) Time Per Week. Friday     • furosemide (LASIX) 40 MG tablet Take 40 mg by mouth 3 (Three) Times a Day.     • hydrALAZINE (APRESOLINE) 100 MG tablet Take 1 tablet by mouth Every 8 (Eight) Hours. 90 tablet 2   • HYDROcodone-acetaminophen (NORCO) 5-325 MG per tablet Take 1 tablet by mouth Every 6 (Six) Hours As Needed for Moderate Pain  or Severe Pain . 120 tablet 0   • insulin aspart (novoLOG) 100 UNIT/ML injection Inject 10 Units under the skin Every Morning Before Breakfast.     • insulin aspart (novoLOG) 100 UNIT/ML injection Inject 8 Units under the skin Daily Before Lunch.     • insulin aspart (novoLOG) 100 UNIT/ML injection Inject 10 Units under the skin Daily Before Supper.     • insulin detemir (LEVEMIR) 100 UNIT/ML injection Inject 12 Units under the skin Every Morning.     • ipratropium-albuterol (DUO-NEB) 0.5-2.5 mg/mL nebulizer Take 3 mL by nebulization 4 (Four) Times a Day.     • isosorbide mononitrate (IMDUR) 30 MG 24 hr tablet Take 1 tablet by mouth Every 12 (Twelve) Hours. 60 tablet 0   • labetalol (NORMODYNE) 100 MG tablet Take 100 mg by mouth 2 (Two) Times a Day.     • lisinopril (PRINIVIL,ZESTRIL) 40 MG tablet Take 40 mg by mouth Daily.     • Multiple Vitamins-Minerals (MULTIVITAMIN & MINERAL PO) Take 1 tablet by mouth Daily.     • nitroglycerin (NITROSTAT) 0.4 MG SL tablet Place 0.4 mg under the tongue Every 5 (Five) Minutes As Needed for chest pain. Take no more than 3 doses in 15 minutes.     • potassium chloride (K-DUR,KLOR-CON) 20 MEQ CR tablet Take 40 mEq by mouth Daily.     • risperiDONE (risperDAL M-TABS) 1 MG disintegrating tablet Take 1 tablet by mouth Every Night. 30 tablet 0         Social History     Socioeconomic History   • Marital status: Single     Spouse name: Not on file   • Number of  "children: Not on file   • Years of education: Not on file   • Highest education level: Not on file   Social Needs   • Financial resource strain: Not on file   • Food insecurity - worry: Not on file   • Food insecurity - inability: Not on file   • Transportation needs - medical: Not on file   • Transportation needs - non-medical: Not on file   Occupational History   • Not on file   Tobacco Use   • Smoking status: Former Smoker     Packs/day: 2.00     Years: 32.00     Pack years: 64.00     Types: Cigarettes     Start date:      Last attempt to quit: 1985     Years since quittin.9   • Smokeless tobacco: Never Used   Substance and Sexual Activity   • Alcohol use: No   • Drug use: No   • Sexual activity: Defer     Birth control/protection: None   Other Topics Concern   • Not on file   Social History Narrative   • Not on file         Family History   Problem Relation Age of Onset   • Hypertension Mother    • Diabetes Mother    • Parkinsonism Father    • Hypertension Brother    • Alcohol abuse Brother        REVIEW OF SYSTEMS:   The patient has baseline dementia, and a complete review of systems is not possible.     Objective:     Vitals:    18 1223 18 1311 18 1651 18 1933   BP:  157/55  134/55   BP Location:  Right arm  Right arm   Patient Position:  Lying  Lying   Pulse: 80 82 84 84   Resp: 20 20 20 20   Temp:  98.5 °F (36.9 °C)  98.3 °F (36.8 °C)   TempSrc:  Oral  Oral   SpO2:  95% 94% 94%   Weight:       Height:         Body mass index is 31.98 kg/m².  Flowsheet Rows      First Filed Value   Admission Height  162.6 cm (64\") Documented at 2018   Admission Weight  83.5 kg (184 lb) Documented at 2018           General:    No acute distress, alert, answers some questions appropriately                   Head:    Normocephalic, atraumatic.   Eyes:          Conjunctivae and sclerae normal, no icterus, PERRLA   Throat:   No oral lesions, no thrush, oral mucosa moist.  "   Neck:   Supple, trachea midline.   Lungs:     Bilateral wheezes, rales at bases     Heart:    Regular rhythm and normal rate.  No obvious murmurs.   Abdomen:     Soft, non-tender, non-distended, positive bowel sounds.    Extremities:   No clubbing, cyanosis, or edema.     Pulses:   Pulses palpable and equal bilaterally.    Skin:   No bleeding or rash.   Neuro:   Non-focal.  Moves all extremities well.    Psychiatric:   Normal mood and affect.       Lab Review:                Results from last 7 days   Lab Units  11/30/18   2339   SODIUM mmol/L  140   POTASSIUM mmol/L  4.4   CHLORIDE mmol/L  105   CO2 mmol/L  26.3   BUN mg/dL  15   CREATININE mg/dL  0.91   GLUCOSE mg/dL  187*   CALCIUM mg/dL  9.7     Results from last 7 days   Lab Units  12/01/18   0907  11/30/18   2339   TROPONIN T ng/mL  0.129*  0.069*     Results from last 7 days   Lab Units  11/30/18   2339   WBC 10*3/mm3  9.51   HEMOGLOBIN g/dL  8.8*   HEMATOCRIT %  30.1*   PLATELETS 10*3/mm3  254             Results from last 7 days   Lab Units  11/30/18   2339   MAGNESIUM mg/dL  2.1         Telemetry:      EKG (reviewed by me personally):                Assessment:   1. Hypertensive urgency  2. Acute on chronic diastolic CHF   3. NSTEMI   4. Positive nuclear stress test in 2/17 (inferolateral territory)  5. Chronic anemia   6. Schizophrenia   7. Dementia   8. Diabetes   9. History of DVT - on Eliquis     Plan:       The patient's blood pressure is now better.  She does have evidence of congestive heart failure with pulmonary edema.  She also has an elevated troponin consistent with a NSTEMI.  She had a positive stress test in February 2017 in the inferolateral territory (correlating with the left circumflex coronary artery).  However, it was felt that she was a poor candidate for intervention and catheterization at that time.  Medical therapy was pursued.  For now, I would hold her Eliquis and start Lovenox in case any invasive cardiac procedure is needed in  the future.  She does have a history of DVT, which evidently has been recent, and she will need to be on anticoagulation long-term.    I agree with diuresis with Lasix 40 mg every 8 hours.  She was evidently on labetalol as an outpatient, although it was not restarted.  I am going to start her on Coreg 3.125 mg twice a day at this point.  She is already taking Imdur 60 mg per day and Lipitor 40 mg per day.  I will also start her on aspirin 81 mg per day given the NSTEMI.  She is not having any active chest pain currently.  Her EKGs do not show major ischemic ST changes.  An echocardiogram has been ordered and is pending.  I will keep her on all of her other blood pressure medications currently.    Thank you very much for this consult.    Juan Alejandro M.D.

## 2018-12-01 NOTE — ED PROVIDER NOTES
EMERGENCY DEPARTMENT ENCOUNTER    CHIEF COMPLAINT  Chief Complaint: Chest pain  History given by: pt  History limited by: none  Room Number: 30/30  PMD: Gustavo Mcgrath MD      HPI:  Pt is a 83 y.o. female who presents complaining of SOA with chest pain that started 1-2 hours ago. Pt denies fever or chills. Pt states that she has had a productive cough. Pt states that she has hx of pneumonia that occurred couple of months ago. Per NH patient was found to have oxygen sats in the 70s and EMS was called.    Duration:  1-2 hours ago  Onset: gradual  Timing: constant  Location: n/a  Radiation: n/a  Quality: n/a  Intensity/Severity: moderate  Progression: worsened  Associated Symptoms: SOA and cough  Aggravating Factors: n/a  Alleviating Factors: n/a  Previous Episodes: Pt states that she has hx of pneumonia   Treatment before arrival: n/a    PAST MEDICAL HISTORY  Active Ambulatory Problems     Diagnosis Date Noted   • NSTEMI (non-ST elevated myocardial infarction) (CMS/[facility]) 02/13/2017   • Diabetes mellitus (CMS/[facility]) 02/13/2017   • Hyperlipidemia 02/13/2017   • Hypertension 02/13/2017   • Schizoaffective disorder (CMS/[facility]) 02/13/2017   • Chronic renal insufficiency, stage III (moderate) (CMS/[facility]) 02/14/2017   • Chronic diastolic (congestive) heart failure 03/25/2017   • Lymphedema of both lower extremities 03/25/2017   • Chest pain 01/09/2018   • Coronary artery disease 01/10/2018   • Paranoid schizophrenia (CMS/[facility]) 01/10/2018   • Lymphedema 01/10/2018   • Acute deep vein thrombosis (DVT) of both lower extremities (CMS/[facility]) 01/10/2018   • Complicated UTI (urinary tract infection) 01/13/2018   • Toxic metabolic encephalopathy 01/13/2018   • Hypokalemia 01/13/2018     Resolved Ambulatory Problems     Diagnosis Date Noted   • Dementia 02/13/2017     Past Medical History:   Diagnosis Date   • Angina pectoris (CMS/[facility])    • Angina pectoris (CMS/[facility])    • Asthma    • Atherosclerosis    • Cellulitis    • CHF (congestive  heart failure) (CMS/[facility])    • Chronic kidney disease    • Coronary artery disease    • Dementia    • Depression    • Diabetes mellitus (CMS/[facility])    • GERD (gastroesophageal reflux disease)    • Hyperlipidemia    • Hypertension    • Lymphedema    • NSTEMI (non-ST elevated myocardial infarction) (CMS/[facility])    • Stroke (CMS/[facility])    • TIA (transient ischemic attack)        PAST SURGICAL HISTORY  Past Surgical History:   Procedure Laterality Date   • CHOLECYSTECTOMY     • HYSTERECTOMY     • PERIPHERALLY INSERTED CENTRAL CATHETER INSERTION         FAMILY HISTORY  History reviewed. No pertinent family history.    SOCIAL HISTORY  Social History     Socioeconomic History   • Marital status: Single     Spouse name: Not on file   • Number of children: Not on file   • Years of education: Not on file   • Highest education level: Not on file   Social Needs   • Financial resource strain: Not on file   • Food insecurity - worry: Not on file   • Food insecurity - inability: Not on file   • Transportation needs - medical: Not on file   • Transportation needs - non-medical: Not on file   Occupational History   • Not on file   Tobacco Use   • Smoking status: Former Smoker     Last attempt to quit:      Years since quittin.9   • Smokeless tobacco: Never Used   Substance and Sexual Activity   • Alcohol use: No   • Drug use: No   • Sexual activity: Defer   Other Topics Concern   • Not on file   Social History Narrative   • Not on file       ALLERGIES  Methyldopa and Sulfa antibiotics    REVIEW OF SYSTEMS  Review of Systems   Constitutional: Negative for fever.   HENT: Negative for sore throat.    Eyes: Negative.    Respiratory: Positive for cough and shortness of breath.    Cardiovascular: Positive for chest pain.   Gastrointestinal: Negative for abdominal pain, diarrhea and vomiting.   Genitourinary: Negative for dysuria.   Musculoskeletal: Negative for neck pain.   Skin: Negative for rash.   Allergic/Immunologic: Negative.     Neurological: Negative for weakness, numbness and headaches.   Hematological: Negative.    Psychiatric/Behavioral: Negative.    All other systems reviewed and are negative.      PHYSICAL EXAM  ED Triage Vitals   Temp Heart Rate Resp BP SpO2   11/30/18 2145 11/30/18 2145 11/30/18 2149 11/30/18 2145 11/30/18 2145   99.7 °F (37.6 °C) 84 18 154/76 100 %      Temp src Heart Rate Source Patient Position BP Location FiO2 (%)   11/30/18 2145 -- -- -- --   Tympanic           Physical Exam   Constitutional: She is oriented to person, place, and time. No distress.   HENT:   Head: Normocephalic and atraumatic.   Eyes: EOM are normal. Pupils are equal, round, and reactive to light.   Neck: Normal range of motion. Neck supple.   Cardiovascular: Normal rate, regular rhythm and normal heart sounds.   Pulmonary/Chest: Effort normal. No respiratory distress. She has wheezes (diffuse).   Abdominal: Soft. There is no tenderness. There is no rebound and no guarding.   Musculoskeletal: Normal range of motion. She exhibits no edema.   Neurological: She is alert and oriented to person, place, and time. She has normal sensation and normal strength.   Skin: Skin is warm and dry. No rash noted.   Psychiatric: Mood and affect normal.   Nursing note and vitals reviewed.      LAB RESULTS  Lab Results (last 24 hours)     Procedure Component Value Units Date/Time    CBC & Differential [[id number]] Collected:  11/30/18 2339    Specimen:  Blood Updated:  12/01/18 0000    Narrative:       The following orders were created for panel order CBC & Differential.  Procedure                               Abnormality         Status                     ---------                               -----------         ------                     CBC Auto Differential[[id number]]        Abnormal            Final result                 Please view results for these tests on the individual orders.    Comprehensive Metabolic Panel [[id number]]  (Abnormal) Collected:   11/30/18 2339    Specimen:  Blood Updated:  12/01/18 0014     Glucose 187 mg/dL      BUN 15 mg/dL      Creatinine 0.91 mg/dL      Sodium 140 mmol/L      Potassium 4.4 mmol/L      Chloride 105 mmol/L      CO2 26.3 mmol/L      Calcium 9.7 mg/dL      Total Protein 7.1 g/dL      Albumin 4.00 g/dL      ALT (SGPT) 20 U/L      AST (SGOT) 23 U/L      Alkaline Phosphatase 105 U/L      Total Bilirubin 0.4 mg/dL      eGFR Non African Amer 59 mL/min/1.73      Globulin 3.1 gm/dL      A/G Ratio 1.3 g/dL      BUN/Creatinine Ratio 16.5     Anion Gap 8.7 mmol/L     Narrative:       The MDRD GFR formula is only valid for adults with stable renal function between ages 18 and 70.    Magnesium [[id number]]  (Normal) Collected:  11/30/18 2339    Specimen:  Blood Updated:  12/01/18 0014     Magnesium 2.1 mg/dL     BNP [[id number]]  (Abnormal) Collected:  11/30/18 2339    Specimen:  Blood Updated:  12/01/18 0016     proBNP 2,589.0 pg/mL     Narrative:       Among patients with dyspnea, NT-proBNP is highly sensitive for the detection of acute congestive heart failure. In addition NT-proBNP of <300 pg/ml effectively rules out acute congestive heart failure with 99% negative predictive value.    Troponin [[id number]]  (Abnormal) Collected:  11/30/18 2339    Specimen:  Blood Updated:  12/01/18 0044     Troponin T 0.069 ng/mL     Narrative:       Troponin T Reference Ranges:  Less than 0.03 ng/mL:    Negative for AMI  0.03 to 0.09 ng/mL:      Indeterminant for AMI  Greater than 0.09 ng/mL: Positive for AMI    Blood Culture - Blood, Arm, Left [[id number]] Collected:  11/30/18 2339    Specimen:  Blood from Arm, Left Updated:  11/30/18 2346    Lactic Acid, Plasma [[id number]]  (Normal) Collected:  11/30/18 2339    Specimen:  Blood Updated:  12/01/18 0003     Lactate 0.8 mmol/L     Procalcitonin [[id number]]  (Normal) Collected:  11/30/18 2339    Specimen:  Blood Updated:  12/01/18 0023     Procalcitonin 0.15 ng/mL     Narrative:       As a Marker for  "Sepsis (Non-Neonates):   1. <0.5 ng/mL represents a low risk of severe sepsis and/or septic shock.  1. >2 ng/mL represents a high risk of severe sepsis and/or septic shock.    As a Marker for Lower Respiratory Tract Infections that require antibiotic therapy:  PCT on Admission     Antibiotic Therapy             6-12 Hrs later  > 0.5                Strongly Recommended            >0.25 - <0.5         Recommended  0.1 - 0.25           Discouraged                   Remeasure/reassess PCT  <0.1                 Strongly Discouraged          Remeasure/reassess PCT      As 28 day mortality risk marker: \"Change in Procalcitonin Result\" (> 80 % or <=80 %) if Day 0 (or Day 1) and Day 4 values are available. Refer to http://www.[employer]pct-calculator.com/   Change in PCT <=80 %   A decrease of PCT levels below or equal to 80 % defines a positive change in PCT test result representing a higher risk for 28-day all-cause mortality of patients diagnosed with severe sepsis or septic shock.  Change in PCT > 80 %   A decrease of PCT levels of more than 80 % defines a negative change in PCT result representing a lower risk for 28-day all-cause mortality of patients diagnosed with severe sepsis or septic shock.                CBC Auto Differential [[id number]]  (Abnormal) Collected:  11/30/18 2339    Specimen:  Blood Updated:  12/01/18 0000     WBC 9.51 10*3/mm3      RBC 3.83 10*6/mm3      Hemoglobin 8.8 g/dL      Hematocrit 30.1 %      MCV 78.6 fL      MCH 23.0 pg      MCHC 29.2 g/dL      RDW 16.3 %      RDW-SD 47.3 fl      MPV 9.8 fL      Platelets 254 10*3/mm3      Neutrophil % 79.3 %      Lymphocyte % 12.2 %      Monocyte % 8.1 %      Eosinophil % 0.3 %      Basophil % 0.1 %      Immature Grans % 0.0 %      Neutrophils, Absolute 7.54 10*3/mm3      Lymphocytes, Absolute 1.16 10*3/mm3      Monocytes, Absolute 0.77 10*3/mm3      Eosinophils, Absolute 0.03 10*3/mm3      Basophils, Absolute 0.01 10*3/mm3      Immature Grans, Absolute 0.00 " 10*3/mm3     Blood Culture - Blood, Arm, Right [[id number]] Collected:  11/30/18 2343    Specimen:  Blood from Arm, Right Updated:  11/30/18 2346    Respiratory Panel, PCR - Swab, Nasopharynx [[id number]]  (Normal) Collected:  12/01/18 0021    Specimen:  Swab from Nasopharynx Updated:  12/01/18 0155     ADENOVIRUS, PCR Not Detected     Coronavirus 229E Not Detected     Coronavirus HKU1 Not Detected     Coronavirus NL63 Not Detected     Coronavirus OC43 Not Detected     Human Metapneumovirus Not Detected     Human Rhinovirus/Enterovirus Not Detected     Influenza B PCR Not Detected     Parainfluenza Virus 1 Not Detected     Parainfluenza Virus 2 Not Detected     Parainfluenza Virus 3 Not Detected     Parainfluenza Virus 4 Not Detected     Bordetella pertussis pcr Not Detected     Influenza A H1 2009 PCR Not Detected     Chlamydophila pneumoniae PCR Not Detected     Mycoplasma pneumo by PCR Not Detected     Influenza A PCR Not Detected     Influenza A H3 Not Detected     Influenza A H1 Not Detected     RSV, PCR Not Detected    Urinalysis With Microscopic If Indicated (No Culture) - Urine, Catheter [[id number]]  (Abnormal) Collected:  12/01/18 0031    Specimen:  Urine, Catheter Updated:  12/01/18 0052     Color, UA Yellow     Appearance, UA Cloudy     pH, UA 7.5     Specific Gravity, UA 1.018     Glucose,  mg/dL (Trace)     Ketones, UA Negative     Bilirubin, UA Negative     Blood, UA Negative     Protein, UA 30 mg/dL (1+)     Leuk Esterase, UA Trace     Nitrite, UA Negative     Urobilinogen, UA 0.2 E.U./dL    Urinalysis, Microscopic Only - Urine, Catheter [[id number]]  (Abnormal) Collected:  12/01/18 0031    Specimen:  Urine, Catheter Updated:  12/01/18 0138     RBC, UA 0-2 /HPF      WBC, UA 3-5 /HPF      Bacteria, UA 4+ /HPF      Squamous Epithelial Cells, UA 3-6 /HPF      Hyaline Casts, UA 0-2 /LPF      Methodology Manual Light Microscopy    Urine Culture - Urine, Urine, Catheter [[id number]] Collected:  12/01/18  0031    Specimen:  Urine, Catheter Updated:  12/01/18 0154          I ordered the above labs and reviewed the results    RADIOLOGY  XR Chest 1 View   Final Result       1. Cardiomegaly and vascular congestion.   2. Consolidation at the right lung base may reflect atelectasis or   infiltrate. Bilateral pleural effusions are noted.       This report was finalized on 11/30/2018 11:39 PM by Dr. Dipika Lyn M.D.               I ordered the above noted radiological studies. Interpreted by radiologist. Reviewed by me in PACS.       PROCEDURES  Procedures  EKG          EKG time: 2330   Rhythm/Rate: NSR, 89  P waves and CO: nml  QRS, axis:  nml  ST and T waves: Diffuse st depression     Interpreted Contemporaneously by me, independently viewed  unchanged compared to prior 01/11/18      PROGRESS AND CONSULTS      2311- Ordered Duoneb for breathing, CXR, BNP, Troponin, UA, Blood culture, Lactic acid, Procal, Resp panel, Mag, CMP,CBC, and EKG.     2428- Ordered Lasix for CHF    2449- Rechecked pt. Pt is resting comfortably with resolution of chest pain after the breathing treatment. Notified pt of labs and radiology. Discussed the plan to admit the pt to the hospital for further work-up and treatment. Pt understands and agrees with the plan, all questions answered.    2450- Placed a call to Dr. Mcgrath.     2457- Discussed pt case with Dr. Mcgrath (PCP) who will admit the pt to a tele bed..      MEDICAL DECISION MAKING  Results were reviewed/discussed with the patient and they were also made aware of online access. Pt also made aware that some labs, such as cultures, will not be resulted during ER visit and follow up with PMD is necessary.     MDM  Number of Diagnoses or Management Options  Acute on chronic congestive heart failure, unspecified heart failure type (CMS/HCC):   Anemia, unspecified type:   Elevated troponin:   Shortness of breath:      Amount and/or Complexity of Data Reviewed  Clinical lab tests: ordered  and reviewed (Hgb- 8.8  WBC- 9.51  Glucose- 187  Troponin- positive  BNP- 2,589  Lactate- negative  Procal- negative  UA- positive  )  Tests in the radiology section of CPT®: ordered and reviewed (CXR- 1. Cardiomegaly and vascular congestion.  2. Consolidation at the right lung base may reflect atelectasis or  infiltrate. Bilateral pleural effusions are noted.    )  Tests in the medicine section of CPT®: reviewed and ordered (See procedure note for EKG  )  Decide to obtain previous medical records or to obtain history from someone other than the patient: yes  Discuss the patient with other providers: yes (Dr. Mcgrath )  Independent visualization of images, tracings, or specimens: yes           DIAGNOSIS  Final diagnoses:   Acute on chronic congestive heart failure, unspecified heart failure type (CMS/HCC)   Shortness of breath   Anemia, unspecified type   Elevated troponin       DISPOSITION  ADMISSION    Discussed treatment plan and reason for admission with pt/family and admitting physician.  Pt/family voiced understanding of the plan for admission for further testing/treatment as needed.         Latest Documented Vital Signs:  As of 2:24 AM  BP- (!) 212/88 HR- 86 Temp- 99.7 °F (37.6 °C) (Tympanic) O2 sat- 94%    --  Documentation assistance provided by magaly Alvarez for Ze Miller.  Information recorded by the francescaibdel was done at my direction and has been verified and validated by me.     Brant Alvarez  12/01/18 0059       Brant Alvarez  12/01/18 0100       Ze Miller PA  12/01/18 0224

## 2018-12-02 LAB
ANION GAP SERPL CALCULATED.3IONS-SCNC: 11 MMOL/L
BACTERIA SPEC AEROBE CULT: ABNORMAL
BUN BLD-MCNC: 20 MG/DL (ref 8–23)
BUN/CREAT SERPL: 16.8 (ref 7–25)
CALCIUM SPEC-SCNC: 9 MG/DL (ref 8.6–10.5)
CHLORIDE SERPL-SCNC: 100 MMOL/L (ref 98–107)
CO2 SERPL-SCNC: 28 MMOL/L (ref 22–29)
CREAT BLD-MCNC: 1.19 MG/DL (ref 0.57–1)
DEPRECATED RDW RBC AUTO: 46.1 FL (ref 37–54)
ERYTHROCYTE [DISTWIDTH] IN BLOOD BY AUTOMATED COUNT: 16.2 % (ref 11.7–13)
GFR SERPL CREATININE-BSD FRML MDRD: 43 ML/MIN/1.73
GLUCOSE BLD-MCNC: 168 MG/DL (ref 65–99)
GLUCOSE BLDC GLUCOMTR-MCNC: 151 MG/DL (ref 70–130)
GLUCOSE BLDC GLUCOMTR-MCNC: 188 MG/DL (ref 70–130)
GLUCOSE BLDC GLUCOMTR-MCNC: 207 MG/DL (ref 70–130)
GLUCOSE BLDC GLUCOMTR-MCNC: 250 MG/DL (ref 70–130)
HCT VFR BLD AUTO: 29.6 % (ref 35.6–45.5)
HGB BLD-MCNC: 8.7 G/DL (ref 11.9–15.5)
MAGNESIUM SERPL-MCNC: 2.2 MG/DL (ref 1.6–2.4)
MCH RBC QN AUTO: 22.8 PG (ref 26.9–32)
MCHC RBC AUTO-ENTMCNC: 29.4 G/DL (ref 32.4–36.3)
MCV RBC AUTO: 77.5 FL (ref 80.5–98.2)
PLATELET # BLD AUTO: 289 10*3/MM3 (ref 140–500)
PMV BLD AUTO: 9.9 FL (ref 6–12)
POTASSIUM BLD-SCNC: 3.8 MMOL/L (ref 3.5–5.2)
RBC # BLD AUTO: 3.82 10*6/MM3 (ref 3.9–5.2)
SODIUM BLD-SCNC: 139 MMOL/L (ref 136–145)
TROPONIN T SERPL-MCNC: 0.07 NG/ML (ref 0–0.03)
WBC NRBC COR # BLD: 8.24 10*3/MM3 (ref 4.5–10.7)

## 2018-12-02 PROCEDURE — 99233 SBSQ HOSP IP/OBS HIGH 50: CPT | Performed by: INTERNAL MEDICINE

## 2018-12-02 PROCEDURE — 93010 ELECTROCARDIOGRAM REPORT: CPT | Performed by: INTERNAL MEDICINE

## 2018-12-02 PROCEDURE — 93005 ELECTROCARDIOGRAM TRACING: CPT | Performed by: INTERNAL MEDICINE

## 2018-12-02 PROCEDURE — 85027 COMPLETE CBC AUTOMATED: CPT | Performed by: INTERNAL MEDICINE

## 2018-12-02 PROCEDURE — 80048 BASIC METABOLIC PNL TOTAL CA: CPT | Performed by: INTERNAL MEDICINE

## 2018-12-02 PROCEDURE — 25010000002 FUROSEMIDE PER 20 MG: Performed by: INTERNAL MEDICINE

## 2018-12-02 PROCEDURE — 63710000001 INSULIN LISPRO (HUMAN) PER 5 UNITS: Performed by: INTERNAL MEDICINE

## 2018-12-02 PROCEDURE — 25010000002 ENOXAPARIN PER 10 MG: Performed by: INTERNAL MEDICINE

## 2018-12-02 PROCEDURE — 84484 ASSAY OF TROPONIN QUANT: CPT | Performed by: INTERNAL MEDICINE

## 2018-12-02 PROCEDURE — 94799 UNLISTED PULMONARY SVC/PX: CPT

## 2018-12-02 PROCEDURE — 82962 GLUCOSE BLOOD TEST: CPT

## 2018-12-02 PROCEDURE — 83735 ASSAY OF MAGNESIUM: CPT | Performed by: INTERNAL MEDICINE

## 2018-12-02 RX ORDER — FUROSEMIDE 10 MG/ML
40 INJECTION INTRAMUSCULAR; INTRAVENOUS EVERY 12 HOURS
Status: DISCONTINUED | OUTPATIENT
Start: 2018-12-02 | End: 2018-12-04

## 2018-12-02 RX ORDER — CARVEDILOL 6.25 MG/1
6.25 TABLET ORAL EVERY 12 HOURS SCHEDULED
Status: DISCONTINUED | OUTPATIENT
Start: 2018-12-02 | End: 2018-12-03

## 2018-12-02 RX ADMIN — HYDRALAZINE HYDROCHLORIDE 75 MG: 50 TABLET, FILM COATED ORAL at 21:06

## 2018-12-02 RX ADMIN — HYDRALAZINE HYDROCHLORIDE 75 MG: 50 TABLET, FILM COATED ORAL at 06:15

## 2018-12-02 RX ADMIN — ATORVASTATIN CALCIUM 40 MG: 20 TABLET, FILM COATED ORAL at 10:18

## 2018-12-02 RX ADMIN — ISOSORBIDE MONONITRATE 60 MG: 60 TABLET ORAL at 10:18

## 2018-12-02 RX ADMIN — IPRATROPIUM BROMIDE AND ALBUTEROL SULFATE 3 ML: 2.5; .5 SOLUTION RESPIRATORY (INHALATION) at 12:28

## 2018-12-02 RX ADMIN — ENOXAPARIN SODIUM 80 MG: 80 INJECTION SUBCUTANEOUS at 10:18

## 2018-12-02 RX ADMIN — CARVEDILOL 6.25 MG: 6.25 TABLET, FILM COATED ORAL at 21:06

## 2018-12-02 RX ADMIN — IPRATROPIUM BROMIDE AND ALBUTEROL SULFATE 3 ML: 2.5; .5 SOLUTION RESPIRATORY (INHALATION) at 16:36

## 2018-12-02 RX ADMIN — CARVEDILOL 6.25 MG: 6.25 TABLET, FILM COATED ORAL at 10:18

## 2018-12-02 RX ADMIN — HYDRALAZINE HYDROCHLORIDE 75 MG: 50 TABLET, FILM COATED ORAL at 13:56

## 2018-12-02 RX ADMIN — FUROSEMIDE 40 MG: 10 INJECTION, SOLUTION INTRAMUSCULAR; INTRAVENOUS at 13:55

## 2018-12-02 RX ADMIN — INSULIN LISPRO 6 UNITS: 100 INJECTION, SOLUTION INTRAVENOUS; SUBCUTANEOUS at 21:06

## 2018-12-02 RX ADMIN — LISINOPRIL 40 MG: 40 TABLET ORAL at 10:19

## 2018-12-02 RX ADMIN — FUROSEMIDE 40 MG: 10 INJECTION, SOLUTION INTRAMUSCULAR; INTRAVENOUS at 00:54

## 2018-12-02 RX ADMIN — ENOXAPARIN SODIUM 80 MG: 80 INJECTION SUBCUTANEOUS at 21:47

## 2018-12-02 RX ADMIN — IPRATROPIUM BROMIDE AND ALBUTEROL SULFATE 3 ML: 2.5; .5 SOLUTION RESPIRATORY (INHALATION) at 08:58

## 2018-12-02 NOTE — PROGRESS NOTES
Hospital Follow Up    LOS:  LOS: 1 day   Patient Name: Britney Deras  Age/Sex: 83 y.o. female  : 1935  MRN: [id number]    Date of Hospital Visit: 18  Length of Stay: 1  Encounter Provider: Corey Tenorio MD  Place of Service: [facility] CARDIOLOGY    Subjective:     Follow Up for: CHF    Interval History: The patient reports that she is feeling better.  Her blood pressure appears to be under better control.  She is diuresing.  Objective:     Objective:  Temp:  [98.2 °F (36.8 °C)-98.5 °F (36.9 °C)] 98.2 °F (36.8 °C)  Heart Rate:  [80-95] 95  Resp:  [18-20] 18  BP: (134-200)/() 156/62  Body mass index is 31.98 kg/m².    Intake/Output Summary (Last 24 hours) at 2018 0837  Last data filed at 2018 1618  Gross per 24 hour   Intake --   Output 900 ml   Net -900 ml         18  2149 18  0300   Weight: 83.5 kg (184 lb) 84.5 kg (186 lb 4.6 oz)     Weight change:     Physical Exam:   General Appearance: Alert, cooperative, in no acute distress. AAOx4.   HEENT: Normocephalic.  Neck: Supple. No JVD. No Carotid bruit. No thyromegaly  Lungs: CTAB. Normal respiratory effort and rate.  Heart:: Regular rate and rhythm, normal S1 and S2, no murmurs, gallops or rubs.  Abdomen: Soft, nontender, non-distended. positive bowel sounds  Extremities: Warm, no cyanosis, or clubbing. No edema.     Lab Review:   Results from last 7 days   Lab Units  18   0446  18   2339   SODIUM mmol/L  139  140   POTASSIUM mmol/L  3.8  4.4   CHLORIDE mmol/L  100  105   CO2 mmol/L  28.0  26.3   BUN mg/dL  20  15   CREATININE mg/dL  1.19*  0.91   GLUCOSE mg/dL  168*  187*   CALCIUM mg/dL  9.0  9.7       Results from last 7 days   Lab Units  18   0446  18   0907  18   2339   TROPONIN T ng/mL  0.072*  0.129*  0.069*     Results from last 7 days   Lab Units  18   0446  18   2339   WBC 10*3/mm3  8.24  9.51   HEMOGLOBIN g/dL  8.7*  8.8*    HEMATOCRIT %  29.6*  30.1*   PLATELETS 10*3/mm3  289  254         Results from last 7 days   Lab Units  12/02/18   0446  11/30/18   2339   MAGNESIUM mg/dL  2.2  2.1         Results from last 7 days   Lab Units  11/30/18   2339   PROBNP pg/mL  2,589.0*             I reviewed the patient's new clinical results.          I personally viewed and interpreted the patient's EKG/Telemetry data.  Current Medications:   Scheduled Meds:  aspirin 81 mg Oral Daily   atorvastatin 40 mg Oral Daily   carvedilol 3.125 mg Oral Q12H   CloNIDine 1 patch Transdermal Weekly   enoxaparin 1 mg/kg Subcutaneous Q12H   furosemide 40 mg Intravenous Q8H   hydrALAZINE 75 mg Oral Q8H   insulin lispro 0-9 Units Subcutaneous 4x Daily With Meals & Nightly   ipratropium-albuterol 3 mL Nebulization 4x Daily - RT   isosorbide mononitrate 60 mg Oral Q24H   lisinopril 40 mg Oral Daily     Continuous Infusions:  Pharmacy to Dose enoxaparin (LOVENOX)        Allergies:  Allergies   Allergen Reactions   • Methyldopa    • Sulfa Antibiotics        Assessment & Plan     1. Hypertensive urgency  2. Acute on chronic diastolic CHF : Echocardiogram pending.  She continues on intravenous diuretic therapy.  Will need to watch renal function.  3. NSTEMI : We'll review echo and decide whether intervention might be indicated.  4. Positive nuclear stress test in 2/17 (inferolateral territory)  5. Chronic anemia   6. Schizophrenia   7. Dementia   8. Diabetes   9. History of DVT - on Eliqumack Tenorio MD  12/02/18

## 2018-12-02 NOTE — SIGNIFICANT NOTE
12/02/18 1112   Rehab Time/Intention   Evaluation Not Performed patient/family declined evaluation  (Attempted PT eval- attempted to encourage and educate but unsuccessful. PT to follow up tomorrow. PAIGE Owens aware.)   Rehab Treatment   Discipline physical therapist   Recommendation   PT - Next Appointment 12/03/18

## 2018-12-02 NOTE — PROGRESS NOTES
Pharmacy consult for Enoxaparin dosing    Britney Deras is a 83 y.o. female 84.5 kg (186 lb 4.6 oz).    Pharmacy consulted to dose per Dr. Alejandro's request.  Indication: Active DVT/PE    Estimated Creatinine Clearance: 49.2 mL/min (by C-G formula based on SCr of 0.91 mg/dL).  Body mass index is 31.98 kg/m².    Creatinine   Date Value Ref Range Status   11/30/2018 0.91 0.57 - 1.00 mg/dL Final     Platelets   Date Value Ref Range Status   11/30/2018 254 140 - 500 10*3/mm3 Final     Lab Results   Component Value Date    INR 1.67 (H) 01/09/2018       PLAN:  Will start Enoxaparin 80mg (1mg/kg) sq every 12 hrs.  Will monitor and adjust as needed.      Thank you for the consult.    Cyril Dacosta RPH  12/02/18 3:01 AM

## 2018-12-02 NOTE — PLAN OF CARE
Problem: Patient Care Overview  Goal: Plan of Care Review  Outcome: Ongoing (interventions implemented as appropriate)   12/02/18 0346   Coping/Psychosocial   Plan of Care Reviewed With patient   Plan of Care Review   Progress no change   OTHER   Outcome Summary Pt a&ox4. C/o left lower extremity pain. Norco administered. Pt has refused to be turned all evening. No signs of aspiration w/ thin liquids. Transthoracic echo scheduled for today. Recieved Lasix this morning and refused to let RN and aide ensure purewick placement. BP WNL this evening. Other VS stable. Will continue to monitor.      Goal: Individualization and Mutuality  Outcome: Ongoing (interventions implemented as appropriate)    Goal: Discharge Needs Assessment  Outcome: Ongoing (interventions implemented as appropriate)    Goal: Interprofessional Rounds/Family Conf  Outcome: Ongoing (interventions implemented as appropriate)      Problem: Fall Risk (Adult)  Goal: Identify Related Risk Factors and Signs and Symptoms  Outcome: Ongoing (interventions implemented as appropriate)    Goal: Absence of Fall  Outcome: Ongoing (interventions implemented as appropriate)      Problem: Skin Injury Risk (Adult)  Goal: Identify Related Risk Factors and Signs and Symptoms  Outcome: Ongoing (interventions implemented as appropriate)    Goal: Skin Health and Integrity  Outcome: Ongoing (interventions implemented as appropriate)

## 2018-12-02 NOTE — PROGRESS NOTES
"DAILY PROGRESS NOTE  [facility]    Patient Identification:  Name: Britney Deras  Age: 83 y.o.  Sex: female  :  1935  MRN: [id number]         Primary Care Physician: Gustavo Mcgrath MD    Subjective: patient is better; denies pain or shortness of breath  Interval History: follow up for acute on chronic diastolic chf, cad, uncontrolled hypertension, diabetes     Objective:    Scheduled Meds:  aspirin 81 mg Oral Daily   atorvastatin 40 mg Oral Daily   carvedilol 6.25 mg Oral Q12H   CloNIDine 1 patch Transdermal Weekly   enoxaparin 1 mg/kg Subcutaneous Q12H   furosemide 40 mg Intravenous Q12H   hydrALAZINE 75 mg Oral Q8H   insulin lispro 0-9 Units Subcutaneous 4x Daily With Meals & Nightly   ipratropium-albuterol 3 mL Nebulization 4x Daily - RT   isosorbide mononitrate 60 mg Oral Q24H   lisinopril 40 mg Oral Daily     Continuous Infusions:  Pharmacy to Dose enoxaparin (LOVENOX)        Vital signs in last 24 hours:  Temp:  [98 °F (36.7 °C)-98.6 °F (37 °C)] 98.6 °F (37 °C)  Heart Rate:  [78-95] 78  Resp:  [18-20] 20  BP: (134-159)/(54-66) 141/54    Intake/Output:    Intake/Output Summary (Last 24 hours) at 2018 1400  Last data filed at 2018 1618  Gross per 24 hour   Intake --   Output 900 ml   Net -900 ml       Exam:  /54 (BP Location: Left arm, Patient Position: Lying)   Pulse 78   Temp 98.6 °F (37 °C) (Oral)   Resp 20   Ht 162.6 cm (64\")   Wt 84.5 kg (186 lb 4.6 oz)   LMP  (LMP Unknown)   SpO2 93%   Breastfeeding? No   BMI 31.98 kg/m²     General Appearance:    Alert, cooperative, no distress, AAOx3; chronically ill-appearing                          Head:    Normocephalic, without obvious abnormality, atraumatic                           Eyes:    PERRL, conjunctiva/corneas clear, EOM's intact, both eyes                         Throat:   Lips, tongue, gums normal; oral mucosa pink and moist                           Neck:   Supple, symmetrical, trachea midline, no " JVD                         Lungs:    Decreased breath sounds bilaterally, respirations unlabored                 Chest Wall:    No tenderness or deformity                          Heart:    Regular rate and rhythm, S1 and S2 normal, no murmur,no  Rub                                      or gallop                  Abdomen:     Soft, non-tender, bowel sounds active, no masses, no                                                        organomegaly                  Extremities:   Extremities normal, atraumatic, no cyanosis or edema                        Pulses:   Pulses palpable in all extremities                            Skin:   Skin is warm and dry,  no rashes or palpable lesions                     Data Review:  Lab Results   Component Value Date    WBC 8.24 12/02/2018    HGB 8.7 (L) 12/02/2018    HCT 29.6 (L) 12/02/2018     12/02/2018     Lab Results   Component Value Date     12/02/2018    K 3.8 12/02/2018     12/02/2018    CO2 28.0 12/02/2018    BUN 20 12/02/2018    CREATININE 1.19 (H) 12/02/2018    GLUCOSE 168 (H) 12/02/2018     Lab Results   Component Value Date    CALCIUM 9.0 12/02/2018    MG 2.2 12/02/2018     Lab Results   Component Value Date    AST 23 11/30/2018    ALT 20 11/30/2018    ALKPHOS 105 11/30/2018     No results found for: APTT, INR  Patient Active Problem List   Diagnosis Code   • NSTEMI (non-ST elevated myocardial infarction) (CMS/HCC) I21.4   • Diabetes mellitus (CMS/HCC) E11.9   • Hyperlipidemia E78.5   • Hypertension I10   • Schizoaffective disorder (CMS/HCC) F25.9   • Chronic renal insufficiency, stage III (moderate) (CMS/HCC) N18.3   • Chronic diastolic (congestive) heart failure I50.32   • Lymphedema of both lower extremities I89.0   • Chest pain R07.9   • Coronary artery disease I25.10   • Paranoid schizophrenia (CMS/[facility]) F20.0   • Lymphedema I89.0   • Acute deep vein thrombosis (DVT) of both lower extremities (CMS/HCC) I82.403   • Complicated UTI (urinary tract  infection) N39.0   • Toxic metabolic encephalopathy G92   • Hypokalemia E87.6   • Acute on chronic congestive heart failure (CMS/HCC) I50.9       Assessment:    Acute on chronic diastolic congestive heart failure (CMS/HCC)  Cad  Hypertension uncontrolled  Schizophrenia  diabetes  nstemi  Plan:  Appreciate input from cardiology  Blood pressure has improved  Recheck labs in the am  May need further ischemic workup   Medium risk    Lyssa Ross MD  12/2/2018  2:00 PM

## 2018-12-03 ENCOUNTER — APPOINTMENT (OUTPATIENT)
Dept: GENERAL RADIOLOGY | Facility: HOSPITAL | Age: 83
End: 2018-12-03

## 2018-12-03 ENCOUNTER — APPOINTMENT (OUTPATIENT)
Dept: CARDIOLOGY | Facility: HOSPITAL | Age: 83
End: 2018-12-03
Attending: INTERNAL MEDICINE

## 2018-12-03 LAB
ANION GAP SERPL CALCULATED.3IONS-SCNC: 11.2 MMOL/L
BH CV ECHO MEAS - ACS: 1.3 CM
BH CV ECHO MEAS - AO MAX PG (FULL): 4.7 MMHG
BH CV ECHO MEAS - AO MAX PG: 14 MMHG
BH CV ECHO MEAS - AO MEAN PG (FULL): 4 MMHG
BH CV ECHO MEAS - AO MEAN PG: 9 MMHG
BH CV ECHO MEAS - AO ROOT AREA (BSA CORRECTED): 1.3
BH CV ECHO MEAS - AO ROOT AREA: 4.9 CM^2
BH CV ECHO MEAS - AO ROOT DIAM: 2.5 CM
BH CV ECHO MEAS - AO V2 MAX: 187 CM/SEC
BH CV ECHO MEAS - AO V2 MEAN: 140 CM/SEC
BH CV ECHO MEAS - AO V2 VTI: 37.9 CM
BH CV ECHO MEAS - AVA(I,A): 2.4 CM^2
BH CV ECHO MEAS - AVA(I,D): 2.4 CM^2
BH CV ECHO MEAS - AVA(V,A): 2.3 CM^2
BH CV ECHO MEAS - AVA(V,D): 2.3 CM^2
BH CV ECHO MEAS - BSA(HAYCOCK): 1.9 M^2
BH CV ECHO MEAS - BSA: 1.9 M^2
BH CV ECHO MEAS - BZI_BMI: 30.9 KILOGRAMS/M^2
BH CV ECHO MEAS - BZI_METRIC_HEIGHT: 162.6 CM
BH CV ECHO MEAS - BZI_METRIC_WEIGHT: 81.6 KG
BH CV ECHO MEAS - EDV(CUBED): 64 ML
BH CV ECHO MEAS - EDV(MOD-SP2): 81 ML
BH CV ECHO MEAS - EDV(MOD-SP4): 98 ML
BH CV ECHO MEAS - EDV(TEICH): 70 ML
BH CV ECHO MEAS - EF(CUBED): 69.2 %
BH CV ECHO MEAS - EF(MOD-BP): 77 %
BH CV ECHO MEAS - EF(MOD-SP2): 74.1 %
BH CV ECHO MEAS - EF(MOD-SP4): 79.6 %
BH CV ECHO MEAS - EF(TEICH): 61.4 %
BH CV ECHO MEAS - ESV(CUBED): 19.7 ML
BH CV ECHO MEAS - ESV(MOD-SP2): 21 ML
BH CV ECHO MEAS - ESV(MOD-SP4): 20 ML
BH CV ECHO MEAS - ESV(TEICH): 27 ML
BH CV ECHO MEAS - FS: 32.5 %
BH CV ECHO MEAS - IVS/LVPW: 1.1
BH CV ECHO MEAS - IVSD: 1.2 CM
BH CV ECHO MEAS - LAT PEAK E' VEL: 7.4 CM/SEC
BH CV ECHO MEAS - LV DIASTOLIC VOL/BSA (35-75): 52.4 ML/M^2
BH CV ECHO MEAS - LV MASS(C)D: 155.4 GRAMS
BH CV ECHO MEAS - LV MASS(C)DI: 83.1 GRAMS/M^2
BH CV ECHO MEAS - LV MAX PG: 9.2 MMHG
BH CV ECHO MEAS - LV MEAN PG: 5 MMHG
BH CV ECHO MEAS - LV SYSTOLIC VOL/BSA (12-30): 10.7 ML/M^2
BH CV ECHO MEAS - LV V1 MAX: 152 CM/SEC
BH CV ECHO MEAS - LV V1 MEAN: 106 CM/SEC
BH CV ECHO MEAS - LV V1 VTI: 32.1 CM
BH CV ECHO MEAS - LVIDD: 4 CM
BH CV ECHO MEAS - LVIDS: 2.7 CM
BH CV ECHO MEAS - LVLD AP2: 7.9 CM
BH CV ECHO MEAS - LVLD AP4: 8.2 CM
BH CV ECHO MEAS - LVLS AP2: 7.2 CM
BH CV ECHO MEAS - LVLS AP4: 6.5 CM
BH CV ECHO MEAS - LVOT AREA (M): 2.8 CM^2
BH CV ECHO MEAS - LVOT AREA: 2.8 CM^2
BH CV ECHO MEAS - LVOT DIAM: 1.9 CM
BH CV ECHO MEAS - LVPWD: 1.1 CM
BH CV ECHO MEAS - MED PEAK E' VEL: 5.2 CM/SEC
BH CV ECHO MEAS - MV A DUR: 0.18 SEC
BH CV ECHO MEAS - MV A MAX VEL: 114 CM/SEC
BH CV ECHO MEAS - MV DEC SLOPE: 578 CM/SEC^2
BH CV ECHO MEAS - MV DEC TIME: 0.2 SEC
BH CV ECHO MEAS - MV E MAX VEL: 139 CM/SEC
BH CV ECHO MEAS - MV E/A: 1.2
BH CV ECHO MEAS - MV MAX PG: 8 MMHG
BH CV ECHO MEAS - MV MEAN PG: 3 MMHG
BH CV ECHO MEAS - MV P1/2T MAX VEL: 136 CM/SEC
BH CV ECHO MEAS - MV P1/2T: 68.9 MSEC
BH CV ECHO MEAS - MV V2 MAX: 141 CM/SEC
BH CV ECHO MEAS - MV V2 MEAN: 82.1 CM/SEC
BH CV ECHO MEAS - MV V2 VTI: 37.5 CM
BH CV ECHO MEAS - MVA P1/2T LCG: 1.6 CM^2
BH CV ECHO MEAS - MVA(P1/2T): 3.2 CM^2
BH CV ECHO MEAS - MVA(VTI): 2.4 CM^2
BH CV ECHO MEAS - PA ACC TIME: 0.13 SEC
BH CV ECHO MEAS - PA MAX PG (FULL): 4 MMHG
BH CV ECHO MEAS - PA MAX PG: 9.4 MMHG
BH CV ECHO MEAS - PA PR(ACCEL): 18.7 MMHG
BH CV ECHO MEAS - PA V2 MAX: 153 CM/SEC
BH CV ECHO MEAS - PULM A REVS DUR: 0.15 SEC
BH CV ECHO MEAS - PULM A REVS VEL: 35.9 CM/SEC
BH CV ECHO MEAS - PULM DIAS VEL: 35.5 CM/SEC
BH CV ECHO MEAS - PULM S/D: 1.3
BH CV ECHO MEAS - PULM SYS VEL: 45.7 CM/SEC
BH CV ECHO MEAS - PVA(V,A): 1.9 CM^2
BH CV ECHO MEAS - PVA(V,D): 1.9 CM^2
BH CV ECHO MEAS - QP/QS: 0.7
BH CV ECHO MEAS - RV MAX PG: 5.4 MMHG
BH CV ECHO MEAS - RV MEAN PG: 3 MMHG
BH CV ECHO MEAS - RV V1 MAX: 116 CM/SEC
BH CV ECHO MEAS - RV V1 MEAN: 84.9 CM/SEC
BH CV ECHO MEAS - RV V1 VTI: 25 CM
BH CV ECHO MEAS - RVOT AREA: 2.5 CM^2
BH CV ECHO MEAS - RVOT DIAM: 1.8 CM
BH CV ECHO MEAS - SI(AO): 99.5 ML/M^2
BH CV ECHO MEAS - SI(CUBED): 23.7 ML/M^2
BH CV ECHO MEAS - SI(LVOT): 48.7 ML/M^2
BH CV ECHO MEAS - SI(MOD-SP2): 32.1 ML/M^2
BH CV ECHO MEAS - SI(MOD-SP4): 41.7 ML/M^2
BH CV ECHO MEAS - SI(TEICH): 23 ML/M^2
BH CV ECHO MEAS - SV(AO): 186 ML
BH CV ECHO MEAS - SV(CUBED): 44.3 ML
BH CV ECHO MEAS - SV(LVOT): 91 ML
BH CV ECHO MEAS - SV(MOD-SP2): 60 ML
BH CV ECHO MEAS - SV(MOD-SP4): 78 ML
BH CV ECHO MEAS - SV(RVOT): 63.6 ML
BH CV ECHO MEAS - SV(TEICH): 43 ML
BH CV ECHO MEAS - TAPSE (>1.6): 1.8 CM2
BH CV ECHO MEASUREMENTS AVERAGE E/E' RATIO: 22.06
BH CV XLRA - RV BASE: 3.7 CM
BH CV XLRA - RV LENGTH: 6.5 CM
BH CV XLRA - RV MID: 3.2 CM
BH CV XLRA - TDI S': 15.2 CM/SEC
BUN BLD-MCNC: 20 MG/DL (ref 8–23)
BUN/CREAT SERPL: 20 (ref 7–25)
CALCIUM SPEC-SCNC: 8.9 MG/DL (ref 8.6–10.5)
CHLORIDE SERPL-SCNC: 101 MMOL/L (ref 98–107)
CO2 SERPL-SCNC: 27.8 MMOL/L (ref 22–29)
CREAT BLD-MCNC: 1 MG/DL (ref 0.57–1)
DEPRECATED RDW RBC AUTO: 45.1 FL (ref 37–54)
ERYTHROCYTE [DISTWIDTH] IN BLOOD BY AUTOMATED COUNT: 15.8 % (ref 11.7–13)
GFR SERPL CREATININE-BSD FRML MDRD: 53 ML/MIN/1.73
GLUCOSE BLD-MCNC: 130 MG/DL (ref 65–99)
GLUCOSE BLDC GLUCOMTR-MCNC: 133 MG/DL (ref 70–130)
GLUCOSE BLDC GLUCOMTR-MCNC: 188 MG/DL (ref 70–130)
GLUCOSE BLDC GLUCOMTR-MCNC: 220 MG/DL (ref 70–130)
GLUCOSE BLDC GLUCOMTR-MCNC: 229 MG/DL (ref 70–130)
HCT VFR BLD AUTO: 31.1 % (ref 35.6–45.5)
HGB BLD-MCNC: 9.2 G/DL (ref 11.9–15.5)
LEFT ATRIUM VOLUME INDEX: 34 ML/M2
LV EF 2D ECHO EST: 77 %
MAGNESIUM SERPL-MCNC: 2.1 MG/DL (ref 1.6–2.4)
MAXIMAL PREDICTED HEART RATE: 137 BPM
MCH RBC QN AUTO: 23 PG (ref 26.9–32)
MCHC RBC AUTO-ENTMCNC: 29.6 G/DL (ref 32.4–36.3)
MCV RBC AUTO: 77.8 FL (ref 80.5–98.2)
PLATELET # BLD AUTO: 325 10*3/MM3 (ref 140–500)
PMV BLD AUTO: 10.4 FL (ref 6–12)
POTASSIUM BLD-SCNC: 3.5 MMOL/L (ref 3.5–5.2)
RBC # BLD AUTO: 4 10*6/MM3 (ref 3.9–5.2)
SODIUM BLD-SCNC: 140 MMOL/L (ref 136–145)
STRESS TARGET HR: 116 BPM
WBC NRBC COR # BLD: 8.05 10*3/MM3 (ref 4.5–10.7)

## 2018-12-03 PROCEDURE — 63710000001 INSULIN LISPRO (HUMAN) PER 5 UNITS: Performed by: INTERNAL MEDICINE

## 2018-12-03 PROCEDURE — 93306 TTE W/DOPPLER COMPLETE: CPT

## 2018-12-03 PROCEDURE — 25010000002 ENOXAPARIN PER 10 MG: Performed by: INTERNAL MEDICINE

## 2018-12-03 PROCEDURE — 25010000002 CEFTRIAXONE PER 250 MG: Performed by: INTERNAL MEDICINE

## 2018-12-03 PROCEDURE — 97162 PT EVAL MOD COMPLEX 30 MIN: CPT

## 2018-12-03 PROCEDURE — 94799 UNLISTED PULMONARY SVC/PX: CPT

## 2018-12-03 PROCEDURE — 85027 COMPLETE CBC AUTOMATED: CPT | Performed by: INTERNAL MEDICINE

## 2018-12-03 PROCEDURE — 83735 ASSAY OF MAGNESIUM: CPT | Performed by: INTERNAL MEDICINE

## 2018-12-03 PROCEDURE — 93306 TTE W/DOPPLER COMPLETE: CPT | Performed by: INTERNAL MEDICINE

## 2018-12-03 PROCEDURE — 25010000002 HYDRALAZINE PER 20 MG: Performed by: INTERNAL MEDICINE

## 2018-12-03 PROCEDURE — 80048 BASIC METABOLIC PNL TOTAL CA: CPT | Performed by: INTERNAL MEDICINE

## 2018-12-03 PROCEDURE — 25010000002 FUROSEMIDE PER 20 MG: Performed by: INTERNAL MEDICINE

## 2018-12-03 PROCEDURE — 25010000002 PERFLUTREN (DEFINITY) 8.476 MG IN SODIUM CHLORIDE 0.9 % 10 ML INJECTION: Performed by: INTERNAL MEDICINE

## 2018-12-03 PROCEDURE — 82962 GLUCOSE BLOOD TEST: CPT

## 2018-12-03 PROCEDURE — 74230 X-RAY XM SWLNG FUNCJ C+: CPT

## 2018-12-03 PROCEDURE — 92611 MOTION FLUOROSCOPY/SWALLOW: CPT | Performed by: SPEECH-LANGUAGE PATHOLOGIST

## 2018-12-03 PROCEDURE — 99232 SBSQ HOSP IP/OBS MODERATE 35: CPT | Performed by: INTERNAL MEDICINE

## 2018-12-03 RX ORDER — CEFTRIAXONE SODIUM 1 G/50ML
1 INJECTION, SOLUTION INTRAVENOUS EVERY 24 HOURS
Status: DISCONTINUED | OUTPATIENT
Start: 2018-12-03 | End: 2018-12-05 | Stop reason: HOSPADM

## 2018-12-03 RX ORDER — CARVEDILOL 12.5 MG/1
12.5 TABLET ORAL EVERY 12 HOURS SCHEDULED
Status: DISCONTINUED | OUTPATIENT
Start: 2018-12-03 | End: 2018-12-05 | Stop reason: HOSPADM

## 2018-12-03 RX ADMIN — CEFTRIAXONE SODIUM 1 G: 1 INJECTION, SOLUTION INTRAVENOUS at 17:19

## 2018-12-03 RX ADMIN — IPRATROPIUM BROMIDE AND ALBUTEROL SULFATE 3 ML: 2.5; .5 SOLUTION RESPIRATORY (INHALATION) at 11:22

## 2018-12-03 RX ADMIN — LISINOPRIL 40 MG: 40 TABLET ORAL at 08:40

## 2018-12-03 RX ADMIN — INSULIN LISPRO 2 UNITS: 100 INJECTION, SOLUTION INTRAVENOUS; SUBCUTANEOUS at 22:19

## 2018-12-03 RX ADMIN — HYDRALAZINE HYDROCHLORIDE 75 MG: 50 TABLET, FILM COATED ORAL at 15:17

## 2018-12-03 RX ADMIN — HYDRALAZINE HYDROCHLORIDE 75 MG: 50 TABLET, FILM COATED ORAL at 22:19

## 2018-12-03 RX ADMIN — ENOXAPARIN SODIUM 80 MG: 80 INJECTION SUBCUTANEOUS at 09:48

## 2018-12-03 RX ADMIN — PERFLUTREN 2 ML: 6.52 INJECTION, SUSPENSION INTRAVENOUS at 16:44

## 2018-12-03 RX ADMIN — IPRATROPIUM BROMIDE AND ALBUTEROL SULFATE 3 ML: 2.5; .5 SOLUTION RESPIRATORY (INHALATION) at 07:39

## 2018-12-03 RX ADMIN — ENOXAPARIN SODIUM 80 MG: 80 INJECTION SUBCUTANEOUS at 22:19

## 2018-12-03 RX ADMIN — ASPIRIN 81 MG: 81 TABLET, DELAYED RELEASE ORAL at 09:46

## 2018-12-03 RX ADMIN — FUROSEMIDE 40 MG: 10 INJECTION, SOLUTION INTRAMUSCULAR; INTRAVENOUS at 00:16

## 2018-12-03 RX ADMIN — INSULIN LISPRO 4 UNITS: 100 INJECTION, SOLUTION INTRAVENOUS; SUBCUTANEOUS at 12:43

## 2018-12-03 RX ADMIN — ISOSORBIDE MONONITRATE 60 MG: 60 TABLET ORAL at 09:47

## 2018-12-03 RX ADMIN — HYDRALAZINE HYDROCHLORIDE 75 MG: 50 TABLET, FILM COATED ORAL at 05:36

## 2018-12-03 RX ADMIN — BARIUM SULFATE 65 ML: 960 POWDER, FOR SUSPENSION ORAL at 09:03

## 2018-12-03 RX ADMIN — HYDROCODONE BITARTRATE AND ACETAMINOPHEN 1 TABLET: 5; 325 TABLET ORAL at 22:24

## 2018-12-03 RX ADMIN — INSULIN LISPRO 4 UNITS: 100 INJECTION, SOLUTION INTRAVENOUS; SUBCUTANEOUS at 18:37

## 2018-12-03 RX ADMIN — FUROSEMIDE 40 MG: 10 INJECTION, SOLUTION INTRAMUSCULAR; INTRAVENOUS at 12:43

## 2018-12-03 RX ADMIN — ATORVASTATIN CALCIUM 40 MG: 20 TABLET, FILM COATED ORAL at 09:46

## 2018-12-03 RX ADMIN — IPRATROPIUM BROMIDE AND ALBUTEROL SULFATE 3 ML: 2.5; .5 SOLUTION RESPIRATORY (INHALATION) at 15:23

## 2018-12-03 RX ADMIN — SODIUM CHLORIDE, PRESERVATIVE FREE 10 ML: 5 INJECTION INTRAVENOUS at 22:20

## 2018-12-03 RX ADMIN — CARVEDILOL 12.5 MG: 12.5 TABLET, FILM COATED ORAL at 22:19

## 2018-12-03 RX ADMIN — HYDRALAZINE HYDROCHLORIDE 10 MG: 20 INJECTION INTRAMUSCULAR; INTRAVENOUS at 00:19

## 2018-12-03 RX ADMIN — IPRATROPIUM BROMIDE AND ALBUTEROL SULFATE 3 ML: 2.5; .5 SOLUTION RESPIRATORY (INHALATION) at 20:12

## 2018-12-03 RX ADMIN — BARIUM SULFATE 4 ML: 980 POWDER, FOR SUSPENSION ORAL at 09:04

## 2018-12-03 NOTE — PLAN OF CARE
Problem: Patient Care Overview  Goal: Plan of Care Review  Outcome: Ongoing (interventions implemented as appropriate)   12/03/18 3336   Coping/Psychosocial   Plan of Care Reviewed With patient   Plan of Care Review   Progress no change   OTHER   Outcome Summary A&O; SBP elevated; PRN med given x1; Came down to 160; Pt blood sugar was 250; Pt turned and purewick changed; Pt with great urine output after receiving IV lasix; Pt to have 2D echo today; will continue to monitor       Problem: Fall Risk (Adult)  Goal: Absence of Fall  Outcome: Ongoing (interventions implemented as appropriate)      Problem: Skin Injury Risk (Adult)  Goal: Skin Health and Integrity  Outcome: Ongoing (interventions implemented as appropriate)

## 2018-12-03 NOTE — PLAN OF CARE
Problem: Patient Care Overview  Goal: Plan of Care Review   12/03/18 0936   Coping/Psychosocial   Plan of Care Reviewed With patient   OTHER   Outcome Summary SLP completed VFSS with patient demonstrating trace penetration juice from mixed consistency, recommend regular solids with thin liquids, no mixed consistency. No pen/asp with all thins via cup and straw, puree and regular solids. Meds with puree

## 2018-12-03 NOTE — PLAN OF CARE
Problem: Patient Care Overview  Goal: Plan of Care Review  Pt completed schedule test on today (Echo and Swallow) tolerated both well, continues to improve will continue to monitor   12/03/18 3601   Coping/Psychosocial   Plan of Care Reviewed With patient   Plan of Care Review   Progress improving     Goal: Individualization and Mutuality  Outcome: Ongoing (interventions implemented as appropriate)    Goal: Discharge Needs Assessment  Outcome: Ongoing (interventions implemented as appropriate)    Goal: Interprofessional Rounds/Family Conf  Outcome: Ongoing (interventions implemented as appropriate)      Problem: Fall Risk (Adult)  Goal: Identify Related Risk Factors and Signs and Symptoms  Outcome: Ongoing (interventions implemented as appropriate)    Goal: Absence of Fall  Outcome: Ongoing (interventions implemented as appropriate)      Problem: Skin Injury Risk (Adult)  Goal: Identify Related Risk Factors and Signs and Symptoms  Outcome: Ongoing (interventions implemented as appropriate)    Goal: Skin Health and Integrity  Outcome: Ongoing (interventions implemented as appropriate)

## 2018-12-03 NOTE — PROGRESS NOTES
"DAILY PROGRESS NOTE  [facility]      18     Patient Identification:    Name: Britney Deras  Age: 83 y.o.  Sex: female  :  1935  MRN: [id number]         Primary Care Physician: Gustavo Mcgrath MD    Subjective:    Feeling better today, no shortness of breath, no chest pain.  Blood sugar better this morning.  To have a video swallowing study this morning.  Urine culture came back with pansensitive Escherichia coli.  Patient has history of UTIs in the past.  Now with lower abdominal discomfort.  No nausea, vomiting, diarrhea, constipation    Interval History: follow up for acute on chronic diastolic chf, cad, uncontrolled hypertension, diabetes     Objective:    Scheduled Meds:    aspirin 81 mg Oral Daily   atorvastatin 40 mg Oral Daily   carvedilol 6.25 mg Oral Q12H   CloNIDine 1 patch Transdermal Weekly   enoxaparin 1 mg/kg Subcutaneous Q12H   furosemide 40 mg Intravenous Q12H   hydrALAZINE 75 mg Oral Q8H   insulin lispro 0-9 Units Subcutaneous 4x Daily With Meals & Nightly   ipratropium-albuterol 3 mL Nebulization 4x Daily - RT   isosorbide mononitrate 60 mg Oral Q24H   lisinopril 40 mg Oral Daily     Continuous Infusions:    Pharmacy to Dose enoxaparin (LOVENOX)        Vital signs in last 24 hours:  Temp:  [98.5 °F (36.9 °C)-99.1 °F (37.3 °C)] 98.5 °F (36.9 °C)  Heart Rate:  [53-84] 84  Resp:  [20] 20  BP: (141-187)/(53-90) 158/53    Intake/Output:    Intake/Output Summary (Last 24 hours) at 12/3/2018 0922  Last data filed at 12/3/2018 0532  Gross per 24 hour   Intake 360 ml   Output 950 ml   Net -590 ml       Exam:  /53   Pulse 84   Temp 98.5 °F (36.9 °C) (Oral)   Resp 20   Ht 162.6 cm (64\")   Wt 82.1 kg (180 lb 16 oz)   LMP  (LMP Unknown)   SpO2 95%   Breastfeeding? No   BMI 31.07 kg/m²     General Appearance:    Alert, cooperative, no distress, AAOx3; chronically ill-appearing                          Head:    Normocephalic, without obvious abnormality, " atraumatic                           Eyes:    PERRL, conjunctiva/corneas clear, EOM's intact, both eyes                         Throat:   Lips, tongue, gums normal; oral mucosa pink and moist                           Neck:   Supple, symmetrical, trachea midline, no JVD                         Lungs:    Decreased breath sounds bilaterally, respirations unlabored                 Chest Wall:    No tenderness or deformity                          Heart:    Regular rate and rhythm, S1 and S2 normal, no murmur,no  Rub                                      or gallop                  Abdomen:     Soft, non-tender, bowel sounds active, no masses, no                                                        organomegaly                  Extremities:   Extremities normal, atraumatic, no cyanosis or edema                        Pulses:   Pulses palpable in all extremities                            Skin:   Skin is warm and dry,  no rashes or palpable lesions                     Data Review:  Lab Results   Component Value Date    WBC 8.05 12/03/2018    HGB 9.2 (L) 12/03/2018    HCT 31.1 (L) 12/03/2018     12/03/2018     Lab Results   Component Value Date     12/03/2018    K 3.5 12/03/2018     12/03/2018    CO2 27.8 12/03/2018    BUN 20 12/03/2018    CREATININE 1.00 12/03/2018    GLUCOSE 130 (H) 12/03/2018     Lab Results   Component Value Date    CALCIUM 8.9 12/03/2018    MG 2.1 12/03/2018     Lab Results   Component Value Date    AST 23 11/30/2018    ALT 20 11/30/2018    ALKPHOS 105 11/30/2018       Brief Urine Lab Results  (Last result in the past 365 days)      Color   Clarity   Blood   Leuk Est   Nitrite   Protein   CREAT   Urine HCG        12/01/18 0031 Yellow Cloudy Negative Trace Negative 30 mg/dL (1+)             Microbiology Results (last 10 days)     Procedure Component Value - Date/Time    Urine Culture - Urine, Urine, Catheter [[id number]]  (Abnormal)  (Susceptibility) Collected:  12/01/18 0031    Lab  Status:  Final result Specimen:  Urine, Catheter Updated:  12/02/18 1028     Urine Culture >100,000 CFU/mL Escherichia coli    Susceptibility      Escherichia coli     ELODIA     Ampicillin Susceptible     Ampicillin + Sulbactam Susceptible     Cefazolin Susceptible     Cefepime Susceptible     Ceftriaxone Susceptible     Ciprofloxacin Susceptible     Ertapenem Susceptible     Gentamicin Susceptible     Levofloxacin Susceptible     Nitrofurantoin Susceptible     Piperacillin + Tazobactam Susceptible     Tetracycline Susceptible     Trimethoprim + Sulfamethoxazole Resistant                    Respiratory Panel, PCR - Swab, Nasopharynx [[id number]]  (Normal) Collected:  12/01/18 0021    Lab Status:  Final result Specimen:  Swab from Nasopharynx Updated:  12/01/18 0155     ADENOVIRUS, PCR Not Detected     Coronavirus 229E Not Detected     Coronavirus HKU1 Not Detected     Coronavirus NL63 Not Detected     Coronavirus OC43 Not Detected     Human Metapneumovirus Not Detected     Human Rhinovirus/Enterovirus Not Detected     Influenza B PCR Not Detected     Parainfluenza Virus 1 Not Detected     Parainfluenza Virus 2 Not Detected     Parainfluenza Virus 3 Not Detected     Parainfluenza Virus 4 Not Detected     Bordetella pertussis pcr Not Detected     Influenza A H1 2009 PCR Not Detected     Chlamydophila pneumoniae PCR Not Detected     Mycoplasma pneumo by PCR Not Detected     Influenza A PCR Not Detected     Influenza A H3 Not Detected     Influenza A H1 Not Detected     RSV, PCR Not Detected    Blood Culture - Blood, Arm, Right [[id number]] Collected:  11/30/18 2343    Lab Status:  Preliminary result Specimen:  Blood from Arm, Right Updated:  12/03/18 0000     Blood Culture No growth at 2 days    Blood Culture - Blood, Arm, Left [[id number]] Collected:  11/30/18 2339    Lab Status:  Preliminary result Specimen:  Blood from Arm, Left Updated:  12/03/18 0000     Blood Culture No growth at 2 days          No results found for:  APTT, INR  Patient Active Problem List   Diagnosis Code   • NSTEMI (non-ST elevated myocardial infarction) (CMS/HCC) I21.4   • Diabetes mellitus (CMS/HCC) E11.9   • Hyperlipidemia E78.5   • Hypertension I10   • Schizoaffective disorder (CMS/[facility]) F25.9   • Chronic renal insufficiency, stage III (moderate) (CMS/HCC) N18.3   • Chronic diastolic (congestive) heart failure I50.32   • Lymphedema of both lower extremities I89.0   • Chest pain R07.9   • Coronary artery disease I25.10   • Paranoid schizophrenia (CMS/[facility]) F20.0   • Lymphedema I89.0   • Acute deep vein thrombosis (DVT) of both lower extremities (CMS/HCC) I82.403   • Complicated UTI (urinary tract infection) N39.0   • Toxic metabolic encephalopathy G92   • Hypokalemia E87.6   • Acute on chronic congestive heart failure (CMS/[facility]) I50.9       Assessment:    Acute on chronic diastolic congestive heart failure (CMS/HCC)  Cad  Hypertension uncontrolled  Schizophrenia  diabetes  NSTEMI  UTI      Plan:    IV Rocephin for UTI.  Continue cardiovascular treatment.  Lasix to by mouth probably in the morning.  Monitor blood pressure.  Continue Accu-Cheks and sliding scale insulin.  Adjust as needed.  Recheck labs in the am  F/u cardiology recommendations.      Gustavo Mcgrath MD  12/3/2018  9:22 AM

## 2018-12-03 NOTE — MBS/VFSS/FEES
Outpatient Speech Language Pathology   Adult Swallow Initial Evaluation  [facility]     Patient Name: Britney Deras  : 1935  MRN: [id number]  Today's Date: 12/3/2018         Visit Date: 2018   Patient Active Problem List   Diagnosis   • NSTEMI (non-ST elevated myocardial infarction) (CMS/HCC)   • Diabetes mellitus (CMS/HCC)   • Hyperlipidemia   • Hypertension   • Schizoaffective disorder (CMS/HCC)   • Chronic renal insufficiency, stage III (moderate) (CMS/HCC)   • Chronic diastolic (congestive) heart failure   • Lymphedema of both lower extremities   • Chest pain   • Coronary artery disease   • Paranoid schizophrenia (CMS/HCC)   • Lymphedema   • Acute deep vein thrombosis (DVT) of both lower extremities (CMS/HCC)   • Complicated UTI (urinary tract infection)   • Toxic metabolic encephalopathy   • Hypokalemia   • Acute on chronic congestive heart failure (CMS/HCC)        Past Medical History:   Diagnosis Date   • Angina pectoris (CMS/HCC)    • Angina pectoris (CMS/HCC)    • Arthritis    • Asthma    • Atherosclerosis    • Cellulitis    • CHF (congestive heart failure) (CMS/HCC)    • Chronic kidney disease    • Coronary artery disease    • Dementia    • Depression    • Diabetes mellitus (CMS/HCC)    • GERD (gastroesophageal reflux disease)    • History of transfusion    • Hyperlipidemia    • Hypertension    • Lymphedema    • NSTEMI (non-ST elevated myocardial infarction) (CMS/HCC)    • Stroke (CMS/HCC)    • TIA (transient ischemic attack)         Past Surgical History:   Procedure Laterality Date   • ABDOMINAL SURGERY     • CHOLECYSTECTOMY     • COLONOSCOPY     • EYE SURGERY     • HYSTERECTOMY     • PERIPHERALLY INSERTED CENTRAL CATHETER INSERTION           Visit Dx:     ICD-10-CM ICD-9-CM   1. Acute on chronic congestive heart failure, unspecified heart failure type (CMS/HCC) I50.9 428.0   2. Shortness of breath R06.02 786.05   3. Anemia, unspecified type D64.9 285.9   4. Elevated troponin R74.8  790.6   5. Generalized weakness R53.1 780.79           SLP Adult Swallow Evaluation - 12/03/18 0927        Rehab Evaluation    Document Type  evaluation   -KA    Subjective Information  no complaints   -KA    Patient Observations  alert;cooperative   -KA    Patient Effort  good   -KA    Symptoms Noted During/After Treatment  none   -KA       General Information    Patient Profile Reviewed  yes   -KA    Current Method of Nutrition  regular textures;thin liquids   -KA       MBS/VFSS    Utensils Used  spoon;cup;straw   -KA    Consistencies Trialed  regular textures;soft textures;thin liquids;whole;pureed   -KA       MBS/VFSS Interpretation    Oral Prep Phase  WFL   -KA    Oral Transit Phase  impaired   -KA    Oral Residue  WFL   -KA    VFSS Summary  Patient demontrated mild oral phase dysphagia characterized by prolonged mastication, increased A-P transit, piecemeal deglutition and premature spillage to pyriforms with juicy mechanical soft trial. Pharyngeal phase WNL for age. Patient demonstrated trace penetration juice from peaches before and during the swallow with risk for aspiration. No other penetration occured, timely swallow initition with all other PO with no premature spillage. Trace BOT residue     -KA       SLP Communication to Radiology    Summary Statement  Patient demontrated mild oral phase dysphagia characterized by prolonged mastication, increased A-P transit, piecemeal deglutition and premature spillage to pyriforms with juicy mechanical soft trial. Pharyngeal phase WNL for age. Patient demonstrated trace penetration juice from peaches before and during the swallow with risk for aspiration. No other penetration occured, timely swallow initition with all other PO with no premature spillage. Trace BOT residue     -       Clinical Impression    SLP Swallowing Diagnosis  mild;functional pharyngeal phase;oral dysfunction   -KA    Functional Impact  risk of aspiration/pneumonia   -KA    Rehab  Potential/Prognosis, Swallowing  good, to achieve stated therapy goals   -    Swallow Criteria for Skilled Therapeutic Interventions Met  demonstrates skilled criteria   -       Recommendations    Therapy Frequency (Swallow)  PRN   -KA    Predicted Duration Therapy Intervention (Days)  until discharge   -    SLP Diet Recommendation  regular textures;thin liquids;other (see comments) no mixed consistencies    -    Recommended Precautions and Strategies  small bites of food and sips of liquid;upright posture during/after eating   -    SLP Rec. for Method of Medication Administration  meds whole;with pudding or applesauce   -    Monitor for Signs of Aspiration  yes;notify SLP if any concerns   -KA    Anticipated Dischage Disposition  home   -      User Key  (r) = Recorded By, (t) = Taken By, (c) = Cosigned By    Initials Name Provider Type    Fco Giels MA,CCC-SLP Speech and Language Pathologist                            SLP OP Goals     Row Name 12/03/18 0927          Time Calculation    PT Goal Re-Cert Due Date  12/10/18  (Pended)   -       User Key  (r) = Recorded By, (t) = Taken By, (c) = Cosigned By    Initials Name Provider Type    Marguerite Silva, PT Student PT Student                  SLP Outcome Measures (last 72 hours)      SLP Outcome Measures     Row Name 12/03/18 0900 12/01/18 1100          SLP Outcome Measures    Outcome Measure Used?  Adult NOMS  -KA  Adult NOMS  -MT        Adult FCM Scores    FCM Chosen  Swallowing  -KA  Swallowing  -MT     Swallowing FCM Score  6  -KA  7  -MT       User Key  (r) = Recorded By, (t) = Taken By, (c) = Cosigned By    Initials Name Effective Dates    Mary Jessica, MS CCC-SLP 06/08/18 -     Fco Giles MA,[facility]-SLP 06/08/18 -                Time Calculation:   SLP Start Time: 0845  SLP Stop Time: 1130  SLP Time Calculation (min): 30 min    Therapy Charges for Today     Code Description Service Date Service Provider Modifiers Qty     [id number] [facility] MOTION FLUORO EVAL SWALLOW 4 12/3/2018 Fco Sal MA,CCC-SLP GN 1                   Fco Sal MA,GILMAR-SLP  12/3/2018

## 2018-12-03 NOTE — PROGRESS NOTES
"Britney Deras  1935 83 y.o.  [id number]      Patient Care Team:  Gustavo Mcgrath MD as PCP - General (Internal Medicine)  Alesha Quevedo MD as PCP - Claims Attributed    CC: Chest pain, dementia, nursing home, hypertension    Interval History: No chest pain      Objective   Vital Signs  Temp:  [98.5 °F (36.9 °C)-99.1 °F (37.3 °C)] 98.7 °F (37.1 °C)  Heart Rate:  [53-84] 72  Resp:  [20] 20  BP: (145-187)/(53-90) 145/66    Intake/Output Summary (Last 24 hours) at 12/3/2018 1429  Last data filed at 12/3/2018 0532  Gross per 24 hour   Intake 360 ml   Output 950 ml   Net -590 ml     Flowsheet Rows      First Filed Value   Admission Height  162.6 cm (64\") Documented at 11/30/2018 2145   Admission Weight  83.5 kg (184 lb) Documented at 11/30/2018 2149          Physical Exam:   General Appearance:    Alert,oriented, in no acute distress   Lungs:     Clear to auscultation,BS are equal    Heart:    Normal S1 and S2, RRR without murmur, gallop or rub   HEENT:    Sclerae are clear, no JVD or adenopathy   Abdomen:     Normal bowel sounds, soft non-tender, non-distended, no HSM   Extremities:   Moves all extremities well, no edema, no cyanosis, no             Redness, no rash     Medication Review:        aspirin 81 mg Oral Daily   atorvastatin 40 mg Oral Daily   carvedilol 12.5 mg Oral Q12H   ceftriaxone 1 g Intravenous Q24H   CloNIDine 1 patch Transdermal Weekly   enoxaparin 1 mg/kg Subcutaneous Q12H   furosemide 40 mg Intravenous Q12H   hydrALAZINE 75 mg Oral Q8H   insulin lispro 0-9 Units Subcutaneous 4x Daily With Meals & Nightly   ipratropium-albuterol 3 mL Nebulization 4x Daily - RT   isosorbide mononitrate 60 mg Oral Q24H   lisinopril 40 mg Oral Daily       Pharmacy to Dose enoxaparin (LOVENOX)          I reviewed the patient's new clinical results.  I personally viewed and interpreted the patient's EKG/Telemetry data    Assessment/Plan  Active Hospital Problems    Diagnosis Date Noted   • Acute on " chronic congestive heart failure (CMS/[facility]) [I50.9] 12/01/2018      Resolved Hospital Problems   No resolved problems to display.       I think given the constellation of all of her problems the only option from a cardiac standpoint in my opinion is medical therapy. I do not think aggressive interventional therapy is appropriate for her.  We will see as needed.    Aaron Corcoran MD  12/03/18  2:29 PM

## 2018-12-03 NOTE — PLAN OF CARE
"Problem: Patient Care Overview  Goal: Plan of Care Review   12/03/18 0921   Coping/Psychosocial   Plan of Care Reviewed With patient   OTHER   Outcome Summary Pt presents w, dec strength and endurance 2' to medical status that includes admission for acute on chronic CHF. Pt states she was recently only ambulatory to the bathroom (sometimes using RWx) 2' to LLE pain; states that foot and toes are \"frozen\". Upon assessment, pt req Milagro/modAx2 for bed mobility and Milagro for transfers. Pt was able to ambulate 5 feet w, HHA and modAx2. Pt req max encouragement to participate w, PT. Pt may benefit from skilled PT acutley to improve strength and endurance for functional mobility and to assist w, DC planning; SNU vs return to NH w, [facility] PT pending progress.          "

## 2018-12-03 NOTE — SIGNIFICANT NOTE
12/03/18 1258   Rehab Time/Intention   Evaluation Not Performed patient/family declined evaluation  (Pt declined OT; pt states she does not want therapy, that there's no point. OT encouraged and educ pt, but pt cont'd to refuse. OT to attempt again tomorrow (although pt states she will not participate tomorrow either))   Rehab Treatment   Discipline occupational therapist   Recommendation   OT - Next Appointment 12/04/18

## 2018-12-03 NOTE — THERAPY EVALUATION
Acute Care - Physical Therapy Initial Evaluation  [facility]     Patient Name: Britney Deras  : 1935  MRN: [id number]  Today's Date: 12/3/2018   Onset of Illness/Injury or Date of Surgery: (P) 18  Date of Referral to PT: (P) 18  Referring Physician: Mcgrath (P)      Admit Date: 2018    Visit Dx:     ICD-10-CM ICD-9-CM   1. Acute on chronic congestive heart failure, unspecified heart failure type (CMS/HCC) I50.9 428.0   2. Shortness of breath R06.02 786.05   3. Anemia, unspecified type D64.9 285.9   4. Elevated troponin R74.8 790.6   5. Generalized weakness R53.1 780.79     Patient Active Problem List   Diagnosis   • NSTEMI (non-ST elevated myocardial infarction) (CMS/[facility])   • Diabetes mellitus (CMS/[facility])   • Hyperlipidemia   • Hypertension   • Schizoaffective disorder (CMS/[facility])   • Chronic renal insufficiency, stage III (moderate) (CMS/[facility])   • Chronic diastolic (congestive) heart failure   • Lymphedema of both lower extremities   • Chest pain   • Coronary artery disease   • Paranoid schizophrenia (CMS/[facility])   • Lymphedema   • Acute deep vein thrombosis (DVT) of both lower extremities (CMS/[facility])   • Complicated UTI (urinary tract infection)   • Toxic metabolic encephalopathy   • Hypokalemia   • Acute on chronic congestive heart failure (CMS/HCC)     Past Medical History:   Diagnosis Date   • Angina pectoris (CMS/HCC)    • Angina pectoris (CMS/[facility])    • Arthritis    • Asthma    • Atherosclerosis    • Cellulitis    • CHF (congestive heart failure) (CMS/[facility])    • Chronic kidney disease    • Coronary artery disease    • Dementia    • Depression    • Diabetes mellitus (CMS/HCC)    • GERD (gastroesophageal reflux disease)    • History of transfusion    • Hyperlipidemia    • Hypertension    • Lymphedema    • NSTEMI (non-ST elevated myocardial infarction) (CMS/HCC)    • Stroke (CMS/[facility])    • TIA (transient ischemic attack)      Past Surgical History:   Procedure Laterality Date   • ABDOMINAL  SURGERY     • CHOLECYSTECTOMY     • COLONOSCOPY     • EYE SURGERY     • HYSTERECTOMY     • PERIPHERALLY INSERTED CENTRAL CATHETER INSERTION          PT ASSESSMENT (last 12 hours)      Physical Therapy Evaluation     Row Name 12/03/18 0908          PT Evaluation Time/Intention    Subjective Information  complains of;pain  (Pended)   -     Document Type  evaluation  (Pended)   -     Mode of Treatment  individual therapy;physical therapy  (Pended)   -     Patient Effort  fair  (Pended)   -     Row Name 12/03/18 0908          General Information    Patient Profile Reviewed?  yes  (Pended)   -     Onset of Illness/Injury or Date of Surgery  11/30/18  (Pended)   -     Referring Physician  Ramila  (Pended)   -     Patient Observations  alert;poorly cooperative;agree to therapy  (Pended)   -     Patient/Family Observations  pt supine in bed, no acute distress  (Pended)   -     Prior Level of Function  --  (Pended)  difficult to assess  -     Equipment Currently Used at Home  walker, rolling  (Pended)   -     Pertinent History of Current Functional Problem  CHF  (Pended)   -     Existing Precautions/Restrictions  fall  (Pended)   -     Risks Reviewed  patient:  (Pended)   -     Benefits Reviewed  patient:  (Pended)   -     Row Name 12/03/18 0908          Cognitive Assessment/Intervention- PT/OT    Orientation Status (Cognition)  oriented to;person;place;unable/difficult to assess  (Pended)   -     Follows Commands (Cognition)  follows one step commands;75-90% accuracy  (Pended)   -     Row Name 12/03/18 0908          Bed Mobility Assessment/Treatment    Bed Mobility Assessment/Treatment  supine-sit;sit-supine  (Pended)   -     Supine-Sit [place] (Bed Mobility)  minimum assist (75% patient effort);verbal cues  (Pended)   -     Sit-Supine [place] (Bed Mobility)  2 person assist;moderate assist (50% patient effort)  (Pended)   -     Row Name 12/03/18 0908          Transfer  "Assessment/Treatment    Transfer Assessment/Treatment  sit-stand transfer;stand-sit transfer  (Pended)   -     Sit-Stand [place] (Transfers)  minimum assist (75% patient effort);2 person assist  (Pended)   -     Stand-Sit [place] (Transfers)  minimum assist (75% patient effort);2 person assist;verbal cues;nonverbal cues (demo/gesture)  (Pended)   -KH     Row Name 12/03/18 0908          Sit-Stand Transfer    Assistive Device (Sit-Stand Transfers)  --  (Pended)  [facility]  -KH     Row Name 12/03/18 09          Stand-Sit Transfer    Assistive Device (Stand-Sit Transfers)  --  (Pended)  [facility]  -KH     Row Name 12/03/18 0908          Gait/Stairs Assessment/Training    Gait/Stairs Assessment/Training  gait/ambulation assistive device  (Pended)   -     [place] Level (Gait)  moderate assist (50% patient effort);2 person assist  (Pended)   -     Assistive Device (Gait)  --  (Pended)  [facility]     Distance in Feet (Gait)  5  (Pended)   -     Pattern (Gait)  step-to;step-through  (Pended)   -     Deviations/Abnormal Patterns (Gait)  gait speed decreased;stride length decreased  (Pended)   -     Bilateral Gait Deviations  forward flexed posture;heel strike decreased  (Pended)   -     Comment (Gait/Stairs)  pt complained of LLE pain throughout session; stated her foot and toes were \"frozen\"; no grimace or vocal discomfort w, WBing  (Pended)   -KH     Row Name 12/03/18 0908          General ROM    GENERAL ROM COMMENTS  LLE limited 2' to pain; RLE AROM WFL  (Pended)   -KH     Row Name 12/03/18 0908          MMT (Manual Muscle Testing)    General MMT Comments  LLE limited 2' to pain; RLE grossly at least 3/5  (Pended)   -KH     Row Name 12/03/18 0908          Pain Assessment    Additional Documentation  Pain Scale: FACES Pre/Post-Treatment (Group)  (Pended)   -KH     Row Name 12/03/18 0908          Pain Scale: FACES Pre/Post-Treatment    Pain: FACES Scale, Pretreatment  4-->hurts little more  (Pended)   - "     Pain: FACES Scale, Post-Treatment  4-->hurts little more  (Pended)   -     Row Name 12/03/18 0908          Plan of Care Review    Plan of Care Reviewed With  patient  (Pended)   -[facility] Name 12/03/18 09          Physical Therapy Clinical Impression    Date of Referral to PT  12/03/18  (Pended)   -     Criteria for Skilled Interventions Met (PT Clinical Impression)  yes  (Pended)   -     Pathology/Pathophysiology Noted (Describe Specifically for Each System)  musculoskeletal;neuromuscular;cardiovascular  (Pended)   -     Impairments Found (describe specific impairments)  aerobic capacity/endurance;gait, locomotion, and balance;motor function;muscle performance;joint integrity and mobility;posture;ROM  (Pended)   -     Functional Limitations in Following Categories (Describe Specific Limitations)  self-care;home management  (Pended)   -     Rehab Potential (PT Clinical Summary)  fair, will monitor progress closely  (Pended)   -     Row Name 12/03/18 0908          Physical Therapy Goals    Bed Mobility Goal Selection (PT)  bed mobility, PT goal 1  (Pended)   -     Transfer Goal Selection (PT)  transfer, PT goal 1  (Pended)   -     Gait Training Goal Selection (PT)  gait training, PT goal 1  (Pended)   -[facility] Name 12/03/18 09          Bed Mobility Goal 1 (PT)    Activity/Assistive Device (Bed Mobility Goal 1, PT)  bed mobility activities, all  (Pended)   -     [place] Level/Cues Needed (Bed Mobility Goal 1, PT)  supervision required  (Pended)   -     Time Frame (Bed Mobility Goal 1, PT)  long term goal (LTG);1 week  (Pended)   -     Row Name 12/03/18 0908          Transfer Goal 1 (PT)    Activity/Assistive Device (Transfer Goal 1, PT)  sit-to-stand/stand-to-sit;bed-to-chair/chair-to-bed;walker, rolling  (Pended)   -     [place] Level/Cues Needed (Transfer Goal 1, PT)  contact guard assist  (Pended)   -     Time Frame (Transfer Goal 1, PT)  long term goal (LTG);1  week  (Pended)   -     Row Name 12/03/18 0908          Gait Training Goal 1 (PT)    Activity/Assistive Device (Gait Training Goal 1, PT)  assistive device use;walker, rolling  (Pended)   -     [place] Level (Gait Training Goal 1, PT)  contact guard assist  (Pended)   -     Distance (Gait Goal 1, PT)  100  (Pended)   -     Time Frame (Gait Training Goal 1, PT)  long term goal (LTG);1 week  (Pended)   -     Row Name 12/03/18 0908          Positioning and Restraints    Pre-Treatment Position  in bed  (Pended)   -     Post Treatment Position  other  (Pended)   -     Other Position  --  (Pended)  w, transport  -       User Key  (r) = Recorded By, (t) = Taken By, (c) = Cosigned By    Initials Name Provider Type    Marguerite Silva, PT Student PT Student        Physical Therapy Education     Title: PT OT SLP Therapies (In Progress)     Topic: Physical Therapy (In Progress)     Point: Mobility training (In Progress)     Learning Progress Summary           Patient Acceptance, E, NR by  at 12/3/2018  9:21 AM                   Point: Body mechanics (In Progress)     Learning Progress Summary           Patient Acceptance, E, NR by  at 12/3/2018  9:21 AM                   Point: Precautions (In Progress)     Learning Progress Summary           Patient Acceptance, E, NR by  at 12/3/2018  9:21 AM                               User Key     Initials Effective Dates Name Provider Type Discipline     09/17/18 -  Marguerite Yu, PT Student PT Student PT              PT Recommendation and Plan  Anticipated Discharge Disposition (PT): (P) home with home health, home with assist, skilled nursing facility  Planned Therapy Interventions (PT Eval): (P) balance training, bed mobility training, gait training, home exercise program, patient/family education, postural re-education, strengthening, stretching, transfer training  Therapy Frequency (PT Clinical Impression): (P) 3 times/wk  Outcome Summary/Treatment  "Plan (PT)  Anticipated Discharge Disposition (PT): (P) home with home health, home with assist, skilled nursing facility  Plan of Care Reviewed With: (P) patient  Outcome Summary: (P) Pt presents w, dec strength and endurance 2' to medical status that includes admission for acute on chronic CHF. Pt states she was recently only ambulatory to the bathroom (sometimes using RWx) 2' to LLE pain; states that foot and toes are \"frozen\". Upon assessment, pt req [place]/modAx2 for bed mobility and [place] for transfers. Pt was able to ambulate 5 feet w, HHA and modAx2. Pt req max encouragement to participate w, PT. Pt may benefit from skilled PT acutley to improve strength and endurance for functional mobility and to assist w, DC planning; SNU vs return to NH w, [facility] PT pending progress.   Outcome Measures     Row Name 12/03/18 0900             How much help from another person do you currently need...    Turning from your back to your side while in flat bed without using bedrails?  3  (Pended)   -KH      Moving from lying on back to sitting on the side of a flat bed without bedrails?  3  (Pended)   -KH      Moving to and from a bed to a chair (including a wheelchair)?  3  (Pended)   -KH      Standing up from a chair using your arms (e.g., wheelchair, bedside chair)?  3  (Pended)   -KH      Climbing 3-5 steps with a railing?  2  (Pended)   -KH      To walk in hospital room?  3  (Pended)   -KH      AM-PAC 6 Clicks Score  17  (Pended)   -KH         Functional Assessment    Outcome Measure Options  AM-PAC 6 Clicks Basic Mobility (PT)  (Pended)   -KH        User Key  (r) = Recorded By, (t) = Taken By, (c) = Cosigned By    Initials Name Provider Type    Marguerite Silva, PT Student PT Student         Time Calculation:   PT Charges     Row Name 12/03/18 0996             Time Calculation    Start Time  0830  (Pended)   -KH      Stop Time  0841  (Pended)   -KH      Time Calculation (min)  11 min  (Pended)   -VITO      PT Received On  " 12/03/18  (Pended)   -VITO      PT - Next Appointment  12/05/18  (Pended)   -VITO      PT Goal Re-Cert Due Date  12/10/18  (Pended)   -VITO        User Key  (r) = Recorded By, (t) = Taken By, (c) = Cosigned By    Initials Name Provider Type    Marguerite Silva, PT Student PT Student        Therapy Suggested Charges     Code   Minutes Charges    None           Therapy Charges for Today     Code Description Service Date Service Provider Modifiers Qty    12577620668 HC PT THER SUPP EA 15 MIN 12/3/2018 Marguerite Yu, PT Student GP 1    83273517166 HC PT EVAL MOD COMPLEXITY 2 12/3/2018 Marguerite Yu, PT Student GP 1          PT G-Codes  Outcome Measure Options: (P) AM-PAC 6 Clicks Basic Mobility (PT)  AM-PAC 6 Clicks Score: (P) 17      Marguerite Yu, PT Student  12/3/2018

## 2018-12-04 LAB
ANION GAP SERPL CALCULATED.3IONS-SCNC: 12.7 MMOL/L
BUN BLD-MCNC: 21 MG/DL (ref 8–23)
BUN/CREAT SERPL: 16.7 (ref 7–25)
CALCIUM SPEC-SCNC: 8.5 MG/DL (ref 8.6–10.5)
CHLORIDE SERPL-SCNC: 101 MMOL/L (ref 98–107)
CO2 SERPL-SCNC: 28.3 MMOL/L (ref 22–29)
CREAT BLD-MCNC: 1.26 MG/DL (ref 0.57–1)
DEPRECATED RDW RBC AUTO: 45.2 FL (ref 37–54)
ERYTHROCYTE [DISTWIDTH] IN BLOOD BY AUTOMATED COUNT: 15.9 % (ref 11.7–13)
GFR SERPL CREATININE-BSD FRML MDRD: 41 ML/MIN/1.73
GLUCOSE BLD-MCNC: 143 MG/DL (ref 65–99)
GLUCOSE BLDC GLUCOMTR-MCNC: 141 MG/DL (ref 70–130)
GLUCOSE BLDC GLUCOMTR-MCNC: 185 MG/DL (ref 70–130)
GLUCOSE BLDC GLUCOMTR-MCNC: 244 MG/DL (ref 70–130)
GLUCOSE BLDC GLUCOMTR-MCNC: 264 MG/DL (ref 70–130)
HCT VFR BLD AUTO: 28 % (ref 35.6–45.5)
HGB BLD-MCNC: 8.3 G/DL (ref 11.9–15.5)
MCH RBC QN AUTO: 22.9 PG (ref 26.9–32)
MCHC RBC AUTO-ENTMCNC: 29.6 G/DL (ref 32.4–36.3)
MCV RBC AUTO: 77.3 FL (ref 80.5–98.2)
PLATELET # BLD AUTO: 326 10*3/MM3 (ref 140–500)
PMV BLD AUTO: 10 FL (ref 6–12)
POTASSIUM BLD-SCNC: 3.5 MMOL/L (ref 3.5–5.2)
RBC # BLD AUTO: 3.62 10*6/MM3 (ref 3.9–5.2)
SODIUM BLD-SCNC: 142 MMOL/L (ref 136–145)
WBC NRBC COR # BLD: 7.15 10*3/MM3 (ref 4.5–10.7)

## 2018-12-04 PROCEDURE — 80048 BASIC METABOLIC PNL TOTAL CA: CPT | Performed by: INTERNAL MEDICINE

## 2018-12-04 PROCEDURE — 85027 COMPLETE CBC AUTOMATED: CPT | Performed by: INTERNAL MEDICINE

## 2018-12-04 PROCEDURE — 25010000002 CEFTRIAXONE PER 250 MG: Performed by: INTERNAL MEDICINE

## 2018-12-04 PROCEDURE — 25010000002 ENOXAPARIN PER 10 MG: Performed by: INTERNAL MEDICINE

## 2018-12-04 PROCEDURE — 25010000002 FUROSEMIDE PER 20 MG: Performed by: INTERNAL MEDICINE

## 2018-12-04 PROCEDURE — 63710000001 INSULIN LISPRO (HUMAN) PER 5 UNITS: Performed by: INTERNAL MEDICINE

## 2018-12-04 PROCEDURE — 92526 ORAL FUNCTION THERAPY: CPT | Performed by: SPEECH-LANGUAGE PATHOLOGIST

## 2018-12-04 PROCEDURE — 94799 UNLISTED PULMONARY SVC/PX: CPT

## 2018-12-04 PROCEDURE — 82962 GLUCOSE BLOOD TEST: CPT

## 2018-12-04 RX ORDER — FUROSEMIDE 40 MG/1
40 TABLET ORAL
Status: DISCONTINUED | OUTPATIENT
Start: 2018-12-04 | End: 2018-12-05 | Stop reason: HOSPADM

## 2018-12-04 RX ADMIN — FUROSEMIDE 40 MG: 40 TABLET ORAL at 23:38

## 2018-12-04 RX ADMIN — CARVEDILOL 12.5 MG: 12.5 TABLET, FILM COATED ORAL at 09:51

## 2018-12-04 RX ADMIN — ATORVASTATIN CALCIUM 40 MG: 20 TABLET, FILM COATED ORAL at 09:51

## 2018-12-04 RX ADMIN — IPRATROPIUM BROMIDE AND ALBUTEROL SULFATE 3 ML: 2.5; .5 SOLUTION RESPIRATORY (INHALATION) at 15:16

## 2018-12-04 RX ADMIN — ISOSORBIDE MONONITRATE 60 MG: 60 TABLET ORAL at 09:51

## 2018-12-04 RX ADMIN — ENOXAPARIN SODIUM 80 MG: 80 INJECTION SUBCUTANEOUS at 09:51

## 2018-12-04 RX ADMIN — IPRATROPIUM BROMIDE AND ALBUTEROL SULFATE 3 ML: 2.5; .5 SOLUTION RESPIRATORY (INHALATION) at 08:28

## 2018-12-04 RX ADMIN — LISINOPRIL 40 MG: 40 TABLET ORAL at 09:51

## 2018-12-04 RX ADMIN — FUROSEMIDE 40 MG: 10 INJECTION, SOLUTION INTRAMUSCULAR; INTRAVENOUS at 13:11

## 2018-12-04 RX ADMIN — ENOXAPARIN SODIUM 80 MG: 80 INJECTION SUBCUTANEOUS at 20:48

## 2018-12-04 RX ADMIN — IPRATROPIUM BROMIDE AND ALBUTEROL SULFATE 3 ML: 2.5; .5 SOLUTION RESPIRATORY (INHALATION) at 20:27

## 2018-12-04 RX ADMIN — INSULIN LISPRO 6 UNITS: 100 INJECTION, SOLUTION INTRAVENOUS; SUBCUTANEOUS at 21:57

## 2018-12-04 RX ADMIN — INSULIN LISPRO 2 UNITS: 100 INJECTION, SOLUTION INTRAVENOUS; SUBCUTANEOUS at 17:10

## 2018-12-04 RX ADMIN — CEFTRIAXONE SODIUM 1 G: 1 INJECTION, SOLUTION INTRAVENOUS at 15:37

## 2018-12-04 RX ADMIN — INSULIN LISPRO 4 UNITS: 100 INJECTION, SOLUTION INTRAVENOUS; SUBCUTANEOUS at 11:24

## 2018-12-04 RX ADMIN — ASPIRIN 81 MG: 81 TABLET, DELAYED RELEASE ORAL at 09:51

## 2018-12-04 RX ADMIN — IPRATROPIUM BROMIDE AND ALBUTEROL SULFATE 3 ML: 2.5; .5 SOLUTION RESPIRATORY (INHALATION) at 12:34

## 2018-12-04 RX ADMIN — FUROSEMIDE 40 MG: 10 INJECTION, SOLUTION INTRAMUSCULAR; INTRAVENOUS at 02:04

## 2018-12-04 RX ADMIN — HYDRALAZINE HYDROCHLORIDE 75 MG: 50 TABLET, FILM COATED ORAL at 20:48

## 2018-12-04 RX ADMIN — CARVEDILOL 12.5 MG: 12.5 TABLET, FILM COATED ORAL at 20:48

## 2018-12-04 NOTE — PLAN OF CARE
Problem: Patient Care Overview  Goal: Plan of Care Review  Outcome: Ongoing (interventions implemented as appropriate)   12/04/18 1350   Coping/Psychosocial   Plan of Care Reviewed With patient   Plan of Care Review   Progress improving   OTHER   Outcome Summary Pt. has had a good day. No complaints of pain. VSS - held hydralazine due to BP being normal to low. Soft texture diet. PureWick in place with adequate output. Given IV Lasix. Attempted Q2 turns - patient refused but has been shifting her own weight - I did verbally remind her and witness her do this a few times. Remains on RA.        Problem: Fall Risk (Adult)  Goal: Identify Related Risk Factors and Signs and Symptoms  Outcome: Ongoing (interventions implemented as appropriate)      Problem: Skin Injury Risk (Adult)  Goal: Identify Related Risk Factors and Signs and Symptoms  Outcome: Ongoing (interventions implemented as appropriate)    Goal: Skin Health and Integrity  Outcome: Ongoing (interventions implemented as appropriate)

## 2018-12-04 NOTE — PLAN OF CARE
Problem: Patient Care Overview  Goal: Plan of Care Review  Outcome: Ongoing (interventions implemented as appropriate)   12/03/18 1724 12/04/18 0200 12/04/18 0522   Coping/Psychosocial   Plan of Care Reviewed With --  patient --    Plan of Care Review   Progress improving --  --    OTHER   Outcome Summary --  --  VSS, patient complaints of leg pain treated with PO pain med x1, no complaints otherwise. Purewick remains in place, patient refusing turns through the night. Will continue to closely monitor.     Goal: Individualization and Mutuality  Outcome: Ongoing (interventions implemented as appropriate)    Goal: Discharge Needs Assessment  Outcome: Ongoing (interventions implemented as appropriate)    Goal: Interprofessional Rounds/Family Conf  Outcome: Ongoing (interventions implemented as appropriate)      Problem: Fall Risk (Adult)  Goal: Identify Related Risk Factors and Signs and Symptoms  Outcome: Ongoing (interventions implemented as appropriate)    Goal: Absence of Fall  Outcome: Ongoing (interventions implemented as appropriate)      Problem: Skin Injury Risk (Adult)  Goal: Identify Related Risk Factors and Signs and Symptoms  Outcome: Ongoing (interventions implemented as appropriate)    Goal: Skin Health and Integrity  Outcome: Ongoing (interventions implemented as appropriate)

## 2018-12-04 NOTE — THERAPY TREATMENT NOTE
Acute Care - Speech Language Pathology   Swallow Treatment Note [facility]     Patient Name: Britney Deras  : 1935  MRN: [id number]  Today's Date: 2018  Onset of Illness/Injury or Date of Surgery: 18     Referring Physician: Ramila      Admit Date: 2018    Visit Dx:      ICD-10-CM ICD-9-CM   1. Acute on chronic congestive heart failure, unspecified heart failure type (CMS/[facility]) I50.9 428.0   2. Shortness of breath R06.02 786.05   3. Anemia, unspecified type D64.9 285.9   4. Elevated troponin R74.8 790.6   5. Generalized weakness R53.1 780.79     Patient Active Problem List   Diagnosis   • NSTEMI (non-ST elevated myocardial infarction) (CMS/[facility])   • Diabetes mellitus (CMS/[facility])   • Hyperlipidemia   • Hypertension   • Schizoaffective disorder (CMS/[facility])   • Chronic renal insufficiency, stage III (moderate) (CMS/[facility])   • Chronic diastolic (congestive) heart failure   • Lymphedema of both lower extremities   • Chest pain   • Coronary artery disease   • Paranoid schizophrenia (CMS/[facility])   • Lymphedema   • Acute deep vein thrombosis (DVT) of both lower extremities (CMS/[facility])   • Complicated UTI (urinary tract infection)   • Toxic metabolic encephalopathy   • Hypokalemia   • Acute on chronic congestive heart failure (CMS/[facility])       Therapy Treatment  Rehabilitation Treatment Summary     Row Name 18 0900             Treatment Time/Intention    Discipline  speech language pathologist  -SHRUTHI      Recorded by [SHRUTHI] Fco Sal MA,CCC-SLP 18 0949        User Key  (r) = Recorded By, (t) = Taken By, (c) = Cosigned By    Initials Name Effective Dates Discipline    Fco Giles MA,CCC-SLP 18 -  SLP          Outcome Summary             EDUCATION  The patient has been educated in the following areas:   Dysphagia (Swallowing Impairment).    SLP Recommendation and Plan         SLP completed VFSS f/u and educated patient on results of VFSS, no mixed consistencies with rationale and  foods to avoid and safe swallow precautions and handouts provided. Patient reports difficulty chewing meats and agreeable for mechanical soft chopped diet.                               Plan of Care Reviewed With: patient  Plan of Care Review  Plan of Care Reviewed With: patient  Outcome Summary: SLP completed VFSS f/u today and educated patient on recommendation for no mixed consistency with handouts provided on foods to avoid. Patient reported difficulty chewing meats and agreeable for diet change to mechanical soft chopped meats. SLP reviewed safe swallow precautions with patient.       SLP Outcome Measures (last 72 hours)      SLP Outcome Measures     Row Name 12/03/18 0900 12/01/18 1100          SLP Outcome Measures    Outcome Measure Used?  Adult NOMS  -KA  Adult NOMS  -MT        Adult FCM Scores    FCM Chosen  Swallowing  -KA  Swallowing  -MT     Swallowing FCM Score  6  -KA  7  -MT       User Key  (r) = Recorded By, (t) = Taken By, (c) = Cosigned By    Initials Name Effective Dates    MT Mary Grubbs, MS CCC-SLP 06/08/18 -     Fco Giles MA,CCC-SLP 06/08/18 -              Time Calculation:   Time Calculation- SLP     Row Name 12/04/18 0951             Time Calculation- SLP    SLP Start Time  0900  -KA      SLP Received On  12/04/18  -KA        User Key  (r) = Recorded By, (t) = Taken By, (c) = Cosigned By    Initials Name Provider Type    Fco Giles MA,CCC-SLP Speech and Language Pathologist          Therapy Charges for Today     Code Description Service Date Service Provider Modifiers Qty    90153367277 HC ST MOTION FLUORO EVAL SWALLOW 4 12/3/2018 Fco Sal MA,CCC-SLP GN 1    69113192861 HC ST TREATMENT SWALLOW 3 12/4/2018 Fco Sal MA,CCC-SLP GN 1                 Fco Sal MA,GILMAR-SLP  12/4/2018

## 2018-12-04 NOTE — PLAN OF CARE
Problem: Patient Care Overview  Goal: Plan of Care Review   12/04/18 9203   Coping/Psychosocial   Plan of Care Reviewed With patient   OTHER   Outcome Summary SLP completed VFSS f/u today and educated patient on recommendation for no mixed consistency with handouts provided on foods to avoid. Patient reported difficulty chewing meats and agreeable for diet change to mechanical soft chopped meats. SLP reviewed safe swallow precautions with patient.

## 2018-12-04 NOTE — PROGRESS NOTES
"DAILY PROGRESS NOTE  [facility]      18     Patient Identification:    Name: Britney Deras  Age: 83 y.o.  Sex: female  :  1935  MRN: [id number]         Primary Care Physician: Gustavo Mcgrath MD    Subjective:    No shortness of breath, no chest pain.  Eating well  Blood sugar better this morning.  Passed VFSS  On abx for UTI  No nausea, vomiting, diarrhea, constipation    Interval History: follow up for acute on chronic diastolic chf, cad, uncontrolled hypertension, diabetes     Objective:    Scheduled Meds:    aspirin 81 mg Oral Daily   atorvastatin 40 mg Oral Daily   carvedilol 12.5 mg Oral Q12H   ceftriaxone 1 g Intravenous Q24H   CloNIDine 1 patch Transdermal Weekly   enoxaparin 1 mg/kg Subcutaneous Q12H   furosemide 40 mg Intravenous Q12H   hydrALAZINE 75 mg Oral Q8H   insulin lispro 0-9 Units Subcutaneous 4x Daily With Meals & Nightly   ipratropium-albuterol 3 mL Nebulization 4x Daily - RT   isosorbide mononitrate 60 mg Oral Q24H   lisinopril 40 mg Oral Daily     Continuous Infusions:    Pharmacy to Dose enoxaparin (LOVENOX)        Vital signs in last 24 hours:  Temp:  [97.6 °F (36.4 °C)-98.6 °F (37 °C)] 98.5 °F (36.9 °C)  Heart Rate:  [64-87] 67  Resp:  [16-20] 17  BP: (112-162)/(43-68) 112/43    Intake/Output:    Intake/Output Summary (Last 24 hours) at 2018 1416  Last data filed at 12/3/2018 1612  Gross per 24 hour   Intake --   Output 700 ml   Net -700 ml       Exam:  /43 (BP Location: Right arm, Patient Position: Lying)   Pulse 67   Temp 98.5 °F (36.9 °C) (Oral)   Resp 17   Ht 162.6 cm (64\")   Wt 82.1 kg (180 lb 16 oz)   LMP  (LMP Unknown)   SpO2 97%   Breastfeeding? No   BMI 31.07 kg/m²     General Appearance:    Alert, cooperative, no distress, AAOx3; chronically ill-appearing                          Head:    Normocephalic, without obvious abnormality, atraumatic                           Eyes:    PERRL, conjunctiva/corneas clear, EOM's intact, " both eyes                         Throat:   Lips, tongue, gums normal; oral mucosa pink and moist                           Neck:   Supple, symmetrical, trachea midline, no JVD                         Lungs:    Decreased breath sounds bilaterally, respirations unlabored                 Chest Wall:    No tenderness or deformity                          Heart:    Regular rate and rhythm, S1 and S2 normal, no murmur,no  Rub                                      or gallop                  Abdomen:     Soft, non-tender, bowel sounds active, no masses, no                                                        organomegaly                  Extremities:   Extremities normal, atraumatic, no cyanosis or edema                        Pulses:   Pulses palpable in all extremities                            Skin:   Skin is warm and dry,  no rashes or palpable lesions                     Data Review:  Lab Results   Component Value Date    WBC 7.15 12/04/2018    HGB 8.3 (L) 12/04/2018    HCT 28.0 (L) 12/04/2018     12/04/2018     Lab Results   Component Value Date     12/04/2018    K 3.5 12/04/2018     12/04/2018    CO2 28.3 12/04/2018    BUN 21 12/04/2018    CREATININE 1.26 (H) 12/04/2018    GLUCOSE 143 (H) 12/04/2018     Lab Results   Component Value Date    CALCIUM 8.5 (L) 12/04/2018    MG 2.1 12/03/2018     No results found for: AST, ALT, ALKPHOS    Brief Urine Lab Results  (Last result in the past 365 days)      Color   Clarity   Blood   Leuk Est   Nitrite   Protein   CREAT   Urine HCG        12/01/18 0031 Yellow Cloudy Negative Trace Negative 30 mg/dL (1+)             Microbiology Results (last 10 days)     Procedure Component Value - Date/Time    Urine Culture - Urine, Urine, Catheter [[id number]]  (Abnormal)  (Susceptibility) Collected:  12/01/18 0031    Lab Status:  Final result Specimen:  Urine, Catheter Updated:  12/02/18 1028     Urine Culture >100,000 CFU/mL Escherichia coli    Susceptibility       Escherichia coli     ELODIA     Ampicillin Susceptible     Ampicillin + Sulbactam Susceptible     Cefazolin Susceptible     Cefepime Susceptible     Ceftriaxone Susceptible     Ciprofloxacin Susceptible     Ertapenem Susceptible     Gentamicin Susceptible     Levofloxacin Susceptible     Nitrofurantoin Susceptible     Piperacillin + Tazobactam Susceptible     Tetracycline Susceptible     Trimethoprim + Sulfamethoxazole Resistant                    Respiratory Panel, PCR - Swab, Nasopharynx [[id number]]  (Normal) Collected:  12/01/18 0021    Lab Status:  Final result Specimen:  Swab from Nasopharynx Updated:  12/01/18 0155     ADENOVIRUS, PCR Not Detected     Coronavirus 229E Not Detected     Coronavirus HKU1 Not Detected     Coronavirus NL63 Not Detected     Coronavirus OC43 Not Detected     Human Metapneumovirus Not Detected     Human Rhinovirus/Enterovirus Not Detected     Influenza B PCR Not Detected     Parainfluenza Virus 1 Not Detected     Parainfluenza Virus 2 Not Detected     Parainfluenza Virus 3 Not Detected     Parainfluenza Virus 4 Not Detected     Bordetella pertussis pcr Not Detected     Influenza A H1 2009 PCR Not Detected     Chlamydophila pneumoniae PCR Not Detected     Mycoplasma pneumo by PCR Not Detected     Influenza A PCR Not Detected     Influenza A H3 Not Detected     Influenza A H1 Not Detected     RSV, PCR Not Detected    Blood Culture - Blood, Arm, Right [[id number]] Collected:  11/30/18 2343    Lab Status:  Preliminary result Specimen:  Blood from Arm, Right Updated:  12/04/18 0002     Blood Culture No growth at 3 days    Blood Culture - Blood, Arm, Left [[id number]] Collected:  11/30/18 2339    Lab Status:  Preliminary result Specimen:  Blood from Arm, Left Updated:  12/04/18 0002     Blood Culture No growth at 3 days          No results found for: APTT, INR  Patient Active Problem List   Diagnosis Code   • NSTEMI (non-ST elevated myocardial infarction) (CMS/[facility]) I21.4   • Diabetes mellitus  (CMS/[facility]) E11.9   • Hyperlipidemia E78.5   • Hypertension I10   • Schizoaffective disorder (CMS/[facility]) F25.9   • Chronic renal insufficiency, stage III (moderate) (CMS/[facility]) N18.3   • Chronic diastolic (congestive) heart failure I50.32   • Lymphedema of both lower extremities I89.0   • Chest pain R07.9   • Coronary artery disease I25.10   • Paranoid schizophrenia (CMS/[facility]) F20.0   • Lymphedema I89.0   • Acute deep vein thrombosis (DVT) of both lower extremities (CMS/[facility]) I82.403   • Complicated UTI (urinary tract infection) N39.0   • Toxic metabolic encephalopathy G92   • Hypokalemia E87.6   • Acute on chronic congestive heart failure (CMS/[facility]) I50.9       Assessment:    Acute on chronic diastolic congestive heart failure (CMS/[facility])  CAD  Hypertension uncontrolled  Schizophrenia  diabetes  NSTEMI  UTI      Plan:    Continue IV Rocephin for UTI.  Continue cardiovascular treatment. Medical tx as recommended per cardiology  Lasix to PO  Monitor blood pressure.  Continue Accu-Cheks and sliding scale insulin.  Adjust as needed.  Recheck labs in the am  DC in am, if stable    Gustavo Mcgrath MD  12/4/2018  2:16 PM

## 2018-12-04 NOTE — SIGNIFICANT NOTE
12/04/18 1306   Rehab Time/Intention   Evaluation Not Performed patient/family declined evaluation  (Pt declined OT eval again this date despite ongoing encouragement and education regarding benefits; states she does not want therapy at the hospital and will not participate. OT to sign off. Reconsult if appropriate.)   Rehab Treatment   Discipline occupational therapist

## 2018-12-05 VITALS
WEIGHT: 181 LBS | RESPIRATION RATE: 18 BRPM | SYSTOLIC BLOOD PRESSURE: 147 MMHG | HEIGHT: 64 IN | DIASTOLIC BLOOD PRESSURE: 55 MMHG | OXYGEN SATURATION: 96 % | TEMPERATURE: 98.6 F | HEART RATE: 72 BPM | BODY MASS INDEX: 30.9 KG/M2

## 2018-12-05 LAB
ANION GAP SERPL CALCULATED.3IONS-SCNC: 13.4 MMOL/L
BUN BLD-MCNC: 24 MG/DL (ref 8–23)
BUN/CREAT SERPL: 22 (ref 7–25)
CALCIUM SPEC-SCNC: 8.7 MG/DL (ref 8.6–10.5)
CHLORIDE SERPL-SCNC: 102 MMOL/L (ref 98–107)
CO2 SERPL-SCNC: 27.6 MMOL/L (ref 22–29)
CREAT BLD-MCNC: 1.09 MG/DL (ref 0.57–1)
DEPRECATED RDW RBC AUTO: 45.4 FL (ref 37–54)
ERYTHROCYTE [DISTWIDTH] IN BLOOD BY AUTOMATED COUNT: 15.9 % (ref 11.7–13)
GFR SERPL CREATININE-BSD FRML MDRD: 48 ML/MIN/1.73
GLUCOSE BLD-MCNC: 138 MG/DL (ref 65–99)
GLUCOSE BLDC GLUCOMTR-MCNC: 149 MG/DL (ref 70–130)
GLUCOSE BLDC GLUCOMTR-MCNC: 205 MG/DL (ref 70–130)
GLUCOSE BLDC GLUCOMTR-MCNC: 275 MG/DL (ref 70–130)
HCT VFR BLD AUTO: 29.6 % (ref 35.6–45.5)
HGB BLD-MCNC: 8.7 G/DL (ref 11.9–15.5)
MAGNESIUM SERPL-MCNC: 2 MG/DL (ref 1.6–2.4)
MCH RBC QN AUTO: 22.9 PG (ref 26.9–32)
MCHC RBC AUTO-ENTMCNC: 29.4 G/DL (ref 32.4–36.3)
MCV RBC AUTO: 77.9 FL (ref 80.5–98.2)
PLATELET # BLD AUTO: 320 10*3/MM3 (ref 140–500)
PMV BLD AUTO: 10 FL (ref 6–12)
POTASSIUM BLD-SCNC: 3.3 MMOL/L (ref 3.5–5.2)
RBC # BLD AUTO: 3.8 10*6/MM3 (ref 3.9–5.2)
SODIUM BLD-SCNC: 143 MMOL/L (ref 136–145)
WBC NRBC COR # BLD: 7.42 10*3/MM3 (ref 4.5–10.7)

## 2018-12-05 PROCEDURE — 25010000002 ENOXAPARIN PER 10 MG: Performed by: INTERNAL MEDICINE

## 2018-12-05 PROCEDURE — 82962 GLUCOSE BLOOD TEST: CPT

## 2018-12-05 PROCEDURE — 63710000001 INSULIN LISPRO (HUMAN) PER 5 UNITS: Performed by: INTERNAL MEDICINE

## 2018-12-05 PROCEDURE — 94799 UNLISTED PULMONARY SVC/PX: CPT

## 2018-12-05 PROCEDURE — 80048 BASIC METABOLIC PNL TOTAL CA: CPT | Performed by: INTERNAL MEDICINE

## 2018-12-05 PROCEDURE — 85027 COMPLETE CBC AUTOMATED: CPT | Performed by: INTERNAL MEDICINE

## 2018-12-05 PROCEDURE — 83735 ASSAY OF MAGNESIUM: CPT | Performed by: INTERNAL MEDICINE

## 2018-12-05 PROCEDURE — 25010000002 CEFTRIAXONE PER 250 MG: Performed by: INTERNAL MEDICINE

## 2018-12-05 RX ORDER — POTASSIUM CHLORIDE 750 MG/1
40 CAPSULE, EXTENDED RELEASE ORAL DAILY
Status: DISCONTINUED | OUTPATIENT
Start: 2018-12-06 | End: 2018-12-05 | Stop reason: HOSPADM

## 2018-12-05 RX ORDER — CARVEDILOL 12.5 MG/1
12.5 TABLET ORAL EVERY 12 HOURS SCHEDULED
Qty: 60 TABLET | Refills: 3 | Status: SHIPPED | OUTPATIENT
Start: 2018-12-05

## 2018-12-05 RX ORDER — HYDROCODONE BITARTRATE AND ACETAMINOPHEN 5; 325 MG/1; MG/1
1 TABLET ORAL EVERY 6 HOURS PRN
Qty: 120 TABLET | Refills: 0 | Status: ON HOLD | OUTPATIENT
Start: 2018-12-05 | End: 2020-01-01 | Stop reason: SDUPTHER

## 2018-12-05 RX ORDER — CEFUROXIME AXETIL 250 MG/1
250 TABLET ORAL 2 TIMES DAILY
Qty: 6 TABLET | Refills: 0 | Status: SHIPPED | OUTPATIENT
Start: 2018-12-05 | End: 2018-12-08

## 2018-12-05 RX ORDER — FUROSEMIDE 40 MG/1
40 TABLET ORAL 2 TIMES DAILY
Qty: 60 TABLET | Refills: 3 | Status: ON HOLD | OUTPATIENT
Start: 2018-12-05 | End: 2020-01-01 | Stop reason: SDUPTHER

## 2018-12-05 RX ORDER — POTASSIUM CHLORIDE 750 MG/1
40 CAPSULE, EXTENDED RELEASE ORAL ONCE
Status: COMPLETED | OUTPATIENT
Start: 2018-12-05 | End: 2018-12-05

## 2018-12-05 RX ORDER — ASPIRIN 81 MG/1
81 TABLET ORAL DAILY
Qty: 30 TABLET | Refills: 0 | Status: SHIPPED | OUTPATIENT
Start: 2018-12-06

## 2018-12-05 RX ADMIN — IPRATROPIUM BROMIDE AND ALBUTEROL SULFATE 3 ML: 2.5; .5 SOLUTION RESPIRATORY (INHALATION) at 12:41

## 2018-12-05 RX ADMIN — ASPIRIN 81 MG: 81 TABLET, DELAYED RELEASE ORAL at 08:58

## 2018-12-05 RX ADMIN — ATORVASTATIN CALCIUM 40 MG: 20 TABLET, FILM COATED ORAL at 08:57

## 2018-12-05 RX ADMIN — ENOXAPARIN SODIUM 80 MG: 80 INJECTION SUBCUTANEOUS at 10:14

## 2018-12-05 RX ADMIN — ISOSORBIDE MONONITRATE 60 MG: 60 TABLET ORAL at 08:57

## 2018-12-05 RX ADMIN — FUROSEMIDE 40 MG: 40 TABLET ORAL at 08:58

## 2018-12-05 RX ADMIN — LISINOPRIL 40 MG: 40 TABLET ORAL at 08:57

## 2018-12-05 RX ADMIN — HYDROCODONE BITARTRATE AND ACETAMINOPHEN 1 TABLET: 5; 325 TABLET ORAL at 19:37

## 2018-12-05 RX ADMIN — HYDRALAZINE HYDROCHLORIDE 75 MG: 50 TABLET, FILM COATED ORAL at 17:22

## 2018-12-05 RX ADMIN — IPRATROPIUM BROMIDE AND ALBUTEROL SULFATE 3 ML: 2.5; .5 SOLUTION RESPIRATORY (INHALATION) at 07:59

## 2018-12-05 RX ADMIN — FUROSEMIDE 40 MG: 40 TABLET ORAL at 17:45

## 2018-12-05 RX ADMIN — CEFTRIAXONE SODIUM 1 G: 1 INJECTION, SOLUTION INTRAVENOUS at 17:24

## 2018-12-05 RX ADMIN — HYDROCODONE BITARTRATE AND ACETAMINOPHEN 1 TABLET: 5; 325 TABLET ORAL at 09:00

## 2018-12-05 RX ADMIN — CARVEDILOL 12.5 MG: 12.5 TABLET, FILM COATED ORAL at 08:57

## 2018-12-05 RX ADMIN — HYDRALAZINE HYDROCHLORIDE 75 MG: 50 TABLET, FILM COATED ORAL at 06:42

## 2018-12-05 RX ADMIN — POTASSIUM CHLORIDE 40 MEQ: 750 CAPSULE, EXTENDED RELEASE ORAL at 12:01

## 2018-12-05 RX ADMIN — INSULIN LISPRO 4 UNITS: 100 INJECTION, SOLUTION INTRAVENOUS; SUBCUTANEOUS at 17:23

## 2018-12-05 RX ADMIN — INSULIN LISPRO 6 UNITS: 100 INJECTION, SOLUTION INTRAVENOUS; SUBCUTANEOUS at 12:01

## 2018-12-05 NOTE — PLAN OF CARE
Problem: Patient Care Overview  Goal: Plan of Care Review  Outcome: Ongoing (interventions implemented as appropriate)   12/05/18 9114   Coping/Psychosocial   Plan of Care Reviewed With patient   Plan of Care Review   Progress improving   OTHER   Outcome Summary no complaints, CCP not involved they are consulted in the morning to prepare for discharge, tolerating diet, room air, will continue to monitor      Goal: Individualization and Mutuality  Outcome: Ongoing (interventions implemented as appropriate)    Goal: Discharge Needs Assessment  Outcome: Ongoing (interventions implemented as appropriate)

## 2018-12-05 NOTE — DISCHARGE SUMMARY
PHYSICIAN DISCHARGE SUMMARY  [facility], [facility]    Patient Identification:    Name: Britney Deras  Age: 83 y.o.  Sex: female  :  1935  MRN: [id number]    Primary Care Physician: Gustavo Mcgrath MD    Admit date: 2018    Discharge date and time:2018    Discharged Condition: fair    Discharge Diagnoses:    NSTEMI (non-ST elevated myocardial infarction) (CMS/[facility])    Diabetes mellitus (CMS/HCC)    Complicated UTI (urinary tract infection)    Acute on chronic congestive heart failure (CMS/[facility])    Hypertension    Coronary artery disease    Patient Active Problem List   Diagnosis Code   • NSTEMI (non-ST elevated myocardial infarction) (CMS/[facility]) I21.4   • Diabetes mellitus (CMS/HCC) E11.9   • Hyperlipidemia E78.5   • Hypertension I10   • Schizoaffective disorder (CMS/[facility]) F25.9   • Chronic renal insufficiency, stage III (moderate) (CMS/HCC) N18.3   • Chronic diastolic (congestive) heart failure I50.32   • Lymphedema of both lower extremities I89.0   • Chest pain R07.9   • Coronary artery disease I25.10   • Paranoid schizophrenia (CMS/[facility]) F20.0   • Lymphedema I89.0   • Acute deep vein thrombosis (DVT) of both lower extremities (CMS/HCC) I82.403   • Complicated UTI (urinary tract infection) N39.0   • Toxic metabolic encephalopathy G92   • Hypokalemia E87.6   • Acute on chronic congestive heart failure (CMS/[facility]) I50.9          Hospital Course: Britney Deras  is a 83 year old female who was sent to the emergency room from a nursing homewith shortness of breath and chest pain; she has also had a productive cough; feels better now and denies chest pain; no family is present; history is limited due to dementia.    Upon admission patient was given diuretics due to the congestive heart failure, cardiology was consulted.  Troponin became worse, so it was felt that patient developed a non-ST elevation MI.  Blood sugar was treated with Accu-Cheks, and sliding  "scale insulin.  Patient's home medications were continued.  Over next few days patient improved considerably, patient did have some lower abdominal discomfort, UA and urine culture came back with pansensitive Escherichia coli.  Antibiotics were started.  Cardiology recommended that patient was not candidate for any invasive procedure so medical therapy was recommended.  Patient is much better at this time respiratory status back to baseline, no chest pain.  The lower extremity edema is controlled.  Patient is stable to be discharged back to the nursing home.        PMHX:   Past Medical History:   Diagnosis Date   • Angina pectoris (CMS/[facility])    • Angina pectoris (CMS/[facility])    • Arthritis    • Asthma    • Atherosclerosis    • Cellulitis    • CHF (congestive heart failure) (CMS/[facility])    • Chronic kidney disease    • Coronary artery disease    • Dementia    • Depression    • Diabetes mellitus (CMS/[facility])    • GERD (gastroesophageal reflux disease)    • History of transfusion    • Hyperlipidemia    • Hypertension    • Lymphedema    • NSTEMI (non-ST elevated myocardial infarction) (CMS/[facility])    • Stroke (CMS/[facility])    • TIA (transient ischemic attack)      PSHX:   Past Surgical History:   Procedure Laterality Date   • ABDOMINAL SURGERY     • CHOLECYSTECTOMY     • COLONOSCOPY     • EYE SURGERY     • HYSTERECTOMY     • PERIPHERALLY INSERTED CENTRAL CATHETER INSERTION             Consults:     Consults     Date and Time Order Name Status Description    12/1/2018 1330 Inpatient Cardiology Consult Completed     12/1/2018 0050 Family Medicine Consult Completed           Discharge Exam:    /61 (BP Location: Right arm, Patient Position: Lying)   Pulse 70   Temp 98.5 °F (36.9 °C) (Oral)   Resp 18   Ht 162.6 cm (64\")   Wt 82.1 kg (180 lb 16 oz)   LMP  (LMP Unknown)   SpO2 99%   Breastfeeding? No   BMI 31.07 kg/m²     General Appearance:    Alert, cooperative, no distress, AAOx3; chronically ill-appearing                     "      Head:    Normocephalic, without obvious abnormality, atraumatic                           Eyes:    PERRL, conjunctiva/corneas clear, EOM's intact, both eyes                         Throat:   Lips, tongue, gums normal; oral mucosa pink and moist                           Neck:    Supple, symmetrical, trachea midline, no JVD                         Lungs:    Decreased breath sounds bilaterally, respirations unlabored                 Chest Wall:    No tenderness or deformity                          Heart:    Regular rate and rhythm, S1 and S2 normal, no murmur,no  Rub                                      or gallop                  Abdomen:     Soft, non-tender, bowel sounds active, no masses, no                                                        organomegaly                  Extremities:    Extremities normal, atraumatic, no cyanosis or edema                        Pulses:    Pulses palpable in all extremities                            Skin:    Skin is warm and dry,  no rashes or palpable lesions                           Data Review:        Results from last 7 days   Lab Units  12/05/18 0455 12/04/18 0343 12/03/18   0440   WBC 10*3/mm3  7.42  7.15  8.05   HEMOGLOBIN g/dL  8.7*  8.3*  9.2*   HEMATOCRIT %  29.6*  28.0*  31.1*   PLATELETS 10*3/mm3  320  326  325       Results from last 7 days   Lab Units  12/05/18   0455 12/04/18   0343  12/03/18 0440   11/30/18   2339   SODIUM mmol/L  143  142  140   < >  140   POTASSIUM mmol/L  3.3*  3.5  3.5   < >  4.4   CHLORIDE mmol/L  102  101  101   < >  105   CO2 mmol/L  27.6  28.3  27.8   < >  26.3   BUN mg/dL  24*  21  20   < >  15   CREATININE mg/dL  1.09*  1.26*  1.00   < >  0.91   CALCIUM mg/dL  8.7  8.5*  8.9   < >  9.7   BILIRUBIN mg/dL   --    --    --    --   0.4   ALK PHOS U/L   --    --    --    --   105   ALT (SGPT) U/L   --    --    --    --   20   AST (SGOT) U/L   --    --    --    --   23   GLUCOSE mg/dL  138*  143*  130*   < >  187*    < > =  values in this interval not displayed.           Lab Results   Lab Value Date/Time    TROPONINT 0.072 (H) 12/02/2018 0446    TROPONINT 0.129 (C) 12/01/2018 0907    TROPONINT 0.069 (H) 11/30/2018 2339    TROPONINT 0.022 01/10/2018 0630    TROPONINT 0.022 01/09/2018 1828    TROPONINT 0.021 04/20/2017 2134    TROPONINT 0.024 04/20/2017 1710    TROPONINT <0.010 03/24/2017 0517    TROPONINT 0.264 (C) 02/15/2017 0527    TROPONINT 0.424 (C) 02/14/2017 0415    TROPONINT 0.261 (C) 02/13/2017 1311       Microbiology Results (last 10 days)     Procedure Component Value - Date/Time    Urine Culture - Urine, Urine, Catheter [[id number]]  (Abnormal)  (Susceptibility) Collected:  12/01/18 0031    Lab Status:  Final result Specimen:  Urine, Catheter Updated:  12/02/18 1028     Urine Culture >100,000 CFU/mL Escherichia coli    Susceptibility      Escherichia coli     ELODIA     Ampicillin Susceptible     Ampicillin + Sulbactam Susceptible     Cefazolin Susceptible     Cefepime Susceptible     Ceftriaxone Susceptible     Ciprofloxacin Susceptible     Ertapenem Susceptible     Gentamicin Susceptible     Levofloxacin Susceptible     Nitrofurantoin Susceptible     Piperacillin + Tazobactam Susceptible     Tetracycline Susceptible     Trimethoprim + Sulfamethoxazole Resistant                    Respiratory Panel, PCR - Swab, Nasopharynx [[id number]]  (Normal) Collected:  12/01/18 0021    Lab Status:  Final result Specimen:  Swab from Nasopharynx Updated:  12/01/18 0155     ADENOVIRUS, PCR Not Detected     Coronavirus 229E Not Detected     Coronavirus HKU1 Not Detected     Coronavirus NL63 Not Detected     Coronavirus OC43 Not Detected     Human Metapneumovirus Not Detected     Human Rhinovirus/Enterovirus Not Detected     Influenza B PCR Not Detected     Parainfluenza Virus 1 Not Detected     Parainfluenza Virus 2 Not Detected     Parainfluenza Virus 3 Not Detected     Parainfluenza Virus 4 Not Detected     Bordetella pertussis pcr Not  Detected     Influenza A H1 2009 PCR Not Detected     Chlamydophila pneumoniae PCR Not Detected     Mycoplasma pneumo by PCR Not Detected     Influenza A PCR Not Detected     Influenza A H3 Not Detected     Influenza A H1 Not Detected     RSV, PCR Not Detected    Blood Culture - Blood, Arm, Right [[id number]] Collected:  11/30/18 2343    Lab Status:  Preliminary result Specimen:  Blood from Arm, Right Updated:  12/05/18 0000     Blood Culture No growth at 4 days    Blood Culture - Blood, Arm, Left [[id number]] Collected:  11/30/18 2339    Lab Status:  Preliminary result Specimen:  Blood from Arm, Left Updated:  12/05/18 0000     Blood Culture No growth at 4 days           Imaging Results (all)     Procedure Component Value Units Date/Time    FL Video Swallow [[id number]] Collected:  12/04/18 1518     Updated:  12/04/18 1549    Narrative:       VIDEO ESOPHAGRAM 12/03/2018      HISTORY: Dysphagia.     Fluoroscopy was used during performance of the video esophagram by  speech pathology.     Patient demonstrated mild oral phase dysphagia characterized by  prolonged mastication, increased A-P transit, piecemeal deglutition and  premature spillage to pyriforms with juicy mechanical soft trial.  Pharyngeal phase WNL for age. Patient demonstrated trace penetration  juice from peaches before and during the swallow with risk for  aspiration. No other penetration occurred, timely swallow initiation  with all other PO with no premature spillage.        Please see full speech pathology report.     FLUOROSCOPY TIME: Two minutes 54 seconds.  4342 images.     This report was finalized on 12/4/2018 3:46 PM by Dr. Mervin Harvey M.D.       XR Chest 1 View [[id number]] Collected:  11/30/18 2338     Updated:  11/30/18 2342    Narrative:       PORTABLE CHEST RADIOGRAPH     HISTORY: Chest pain and shortness of breath     COMPARISON: January 9, 2018     FINDINGS:  Cardiomegaly is identified. Patient does appear to have  vascular  congestion. There is also consolidation at the right lung base, which  may reflect either atelectasis or infiltrate. No pneumothorax is seen.  There are bilateral pleural effusions. There is some mild left basilar  atelectasis.       Impression:          1. Cardiomegaly and vascular congestion.  2. Consolidation at the right lung base may reflect atelectasis or  infiltrate. Bilateral pleural effusions are noted.     This report was finalized on 11/30/2018 11:39 PM by Dr. Dipika Lyn M.D.               Disposition:    Skilled nursing facility    Patient Instructions:        Discharge Medications      New Medications      Instructions Start Date   aspirin 81 MG EC tablet   81 mg, Oral, Daily      carvedilol 12.5 MG tablet  Commonly known as:  COREG   12.5 mg, Oral, Every 12 Hours Scheduled      cefuroxime 250 MG tablet  Commonly known as:  CEFTIN   250 mg, Oral, 2 Times Daily         Changes to Medications      Instructions Start Date   furosemide 40 MG tablet  Commonly known as:  LASIX  What changed:  when to take this   40 mg, Oral, 2 Times Daily         Continue These Medications      Instructions Start Date   acetaminophen 325 MG tablet  Commonly known as:  TYLENOL   650 mg, Oral, Every 4 Hours PRN      ammonium lactate 12 % cream  Commonly known as:  AMLACTIN   Topical, 2 Times Daily      apixaban 5 MG tablet tablet  Commonly known as:  ELIQUIS   5 mg, Oral, 2 Times Daily      atorvastatin 40 MG tablet  Commonly known as:  LIPITOR   40 mg, Oral, Daily      cholecalciferol 1000 units tablet  Commonly known as:  VITAMIN D3   1,000 Units, Oral, Daily      CloNIDine 0.3 MG/24HR patch  Commonly known as:  CATAPRES-TTS   1 patch, Transdermal, Weekly, Friday      hydrALAZINE 100 MG tablet  Commonly known as:  APRESOLINE   100 mg, Oral, Every 8 Hours Scheduled      HYDROcodone-acetaminophen 5-325 MG per tablet  Commonly known as:  NORCO   1 tablet, Oral, Every 6 Hours PRN      insulin aspart 100 UNIT/ML  injection  Commonly known as:  novoLOG   10 Units, Subcutaneous, Every Morning Before Breakfast      insulin aspart 100 UNIT/ML injection  Commonly known as:  novoLOG   8 Units, Subcutaneous, Daily Before Lunch      insulin aspart 100 UNIT/ML injection  Commonly known as:  novoLOG   10 Units, Subcutaneous, Daily Before Supper      insulin detemir 100 UNIT/ML injection  Commonly known as:  LEVEMIR   12 Units, Subcutaneous, Every Morning      ipratropium-albuterol 0.5-2.5 mg/3 ml nebulizer  Commonly known as:  DUO-NEB   3 mL, Nebulization, 4 Times Daily - RT      isosorbide mononitrate 30 MG 24 hr tablet  Commonly known as:  IMDUR   30 mg, Oral, Every 12 Hours      lisinopril 40 MG tablet  Commonly known as:  PRINIVIL,ZESTRIL   40 mg, Oral, Daily      MULTIVITAMIN & MINERAL PO   1 tablet, Oral, Daily      nitroglycerin 0.4 MG SL tablet  Commonly known as:  NITROSTAT   0.4 mg, Sublingual, Every 5 Minutes PRN, Take no more than 3 doses in 15 minutes.      potassium chloride 20 MEQ CR tablet  Commonly known as:  K-DUR,KLOR-CON   40 mEq, Oral, Daily      risperiDONE 1 MG disintegrating tablet  Commonly known as:  risperDAL M-TABS   1 mg, Oral, Nightly         Stop These Medications    labetalol 100 MG tablet  Commonly known as:  NORMODYNE            Discharge Order (From admission, onward)    None          Follow-up Information     Gustavo Mcgrath MD .    Specialties:  Internal Medicine, Hospitalist  Contact information:  [address] [facility] [address]  [place] [id number] [phone]             Alesha Quevedo MD .    Specialty:  Hand Surgery  Contact information:  [address] [facility] [address]  [facility] [id number] [phone]                   Total time spent discharging patient including evaluation,post hospitalization follow up,  medication and post hospitalization instructions and education total time exceeds 30 minutes.    Signed:  Gustavo Mcgrath MD  12/5/2018  10:07 AM

## 2018-12-05 NOTE — PROGRESS NOTES
Continued Stay Note  [facility]     Patient Name: Britney Deras  MRN: [id number]  Today's Date: 12/5/2018    Admit Date: 11/30/2018    Discharge Plan     Row Name 12/05/18 1236       Plan    Plan  [facility] Medicaid bedhold IC level    Patient/Family in Agreement with Plan  unable to assess    Plan Comments  VM for state guardian for IMM letter and notify of DC orders and verify DC plan.  [facility].  DC summary faxed to [phone]......................Mayra Rodas RN        Discharge Codes    No documentation.       Expected Discharge Date and Time     Expected Discharge Date Expected Discharge Time    Dec 5, 2018             Mayra Rodas RN

## 2018-12-06 LAB
BACTERIA SPEC AEROBE CULT: NORMAL
BACTERIA SPEC AEROBE CULT: NORMAL

## 2018-12-06 NOTE — PROGRESS NOTES
Case Management Discharge Note    Final Note: skilled bed [facility]   [facility] transporting    Destination - Selection Complete      Service Provider Request Status Selected Services Address Phone Number Fax Number    [facility] Selected Skilled Nursing [address] , [facility] [address] [phone]      Durable Medical Equipment      No service has been selected for the patient.      Dialysis/Infusion      No service has been selected for the patient.      Home Medical Care      No service has been selected for the patient.      Community Resources      No service has been selected for the patient.        Other: Other    Final Discharge Disposition Code: 03 - skilled nursing facility (SNF)

## 2020-01-01 ENCOUNTER — APPOINTMENT (OUTPATIENT)
Dept: GENERAL RADIOLOGY | Facility: HOSPITAL | Age: 85
End: 2020-01-01

## 2020-01-01 ENCOUNTER — ANESTHESIA (OUTPATIENT)
Dept: PERIOP | Facility: HOSPITAL | Age: 85
End: 2020-01-01

## 2020-01-01 ENCOUNTER — HOSPITAL ENCOUNTER (INPATIENT)
Facility: HOSPITAL | Age: 85
LOS: 8 days | Discharge: NURSING, REHAB OR LONG-TERM CARE | End: 2020-05-01
Attending: EMERGENCY MEDICINE | Admitting: INTERNAL MEDICINE

## 2020-01-01 ENCOUNTER — ANESTHESIA EVENT (OUTPATIENT)
Dept: PERIOP | Facility: HOSPITAL | Age: 85
End: 2020-01-01

## 2020-01-01 ENCOUNTER — APPOINTMENT (OUTPATIENT)
Dept: CT IMAGING | Facility: HOSPITAL | Age: 85
End: 2020-01-01

## 2020-01-01 ENCOUNTER — APPOINTMENT (OUTPATIENT)
Dept: CARDIOLOGY | Facility: HOSPITAL | Age: 85
End: 2020-01-01

## 2020-01-01 ENCOUNTER — HOSPITAL ENCOUNTER (EMERGENCY)
Facility: HOSPITAL | Age: 85
End: 2020-07-25
Attending: EMERGENCY MEDICINE | Admitting: EMERGENCY MEDICINE

## 2020-01-01 VITALS
RESPIRATION RATE: 16 BRPM | WEIGHT: 181 LBS | HEIGHT: 64 IN | TEMPERATURE: 97.3 F | DIASTOLIC BLOOD PRESSURE: 77 MMHG | OXYGEN SATURATION: 98 % | BODY MASS INDEX: 30.9 KG/M2 | SYSTOLIC BLOOD PRESSURE: 145 MMHG | HEART RATE: 84 BPM

## 2020-01-01 VITALS
WEIGHT: 161 LBS | OXYGEN SATURATION: 80 % | SYSTOLIC BLOOD PRESSURE: 114 MMHG | DIASTOLIC BLOOD PRESSURE: 83 MMHG | BODY MASS INDEX: 27.62 KG/M2 | TEMPERATURE: 97.5 F | RESPIRATION RATE: 28 BRPM

## 2020-01-01 DIAGNOSIS — S72.141A CLOSED INTERTROCHANTERIC FRACTURE OF HIP, RIGHT, INITIAL ENCOUNTER (HCC): Primary | ICD-10-CM

## 2020-01-01 DIAGNOSIS — Z74.09 IMPAIRED MOBILITY: ICD-10-CM

## 2020-01-01 DIAGNOSIS — I46.9 CARDIOPULMONARY ARREST (HCC): Primary | ICD-10-CM

## 2020-01-01 DIAGNOSIS — R79.89 ELEVATED LFTS: ICD-10-CM

## 2020-01-01 LAB
ABO GROUP BLD: NORMAL
ADV 40+41 DNA STL QL NAA+NON-PROBE: NOT DETECTED
ALBUMIN SERPL-MCNC: 2.5 G/DL (ref 3.5–5.2)
ALBUMIN SERPL-MCNC: 3.6 G/DL (ref 3.5–5.2)
ALBUMIN/GLOB SERPL: 1 G/DL
ALBUMIN/GLOB SERPL: 1.2 G/DL
ALP SERPL-CCNC: 118 U/L (ref 39–117)
ALP SERPL-CCNC: 121 U/L (ref 39–117)
ALT SERPL W P-5'-P-CCNC: 64 U/L (ref 1–33)
ALT SERPL W P-5'-P-CCNC: 71 U/L (ref 1–33)
ANION GAP SERPL CALCULATED.3IONS-SCNC: 10.4 MMOL/L (ref 5–15)
ANION GAP SERPL CALCULATED.3IONS-SCNC: 11.3 MMOL/L (ref 5–15)
ANION GAP SERPL CALCULATED.3IONS-SCNC: 11.3 MMOL/L (ref 5–15)
ANION GAP SERPL CALCULATED.3IONS-SCNC: 12.6 MMOL/L (ref 5–15)
ANION GAP SERPL CALCULATED.3IONS-SCNC: 12.8 MMOL/L (ref 5–15)
ANION GAP SERPL CALCULATED.3IONS-SCNC: 14.4 MMOL/L (ref 5–15)
ANION GAP SERPL CALCULATED.3IONS-SCNC: 9 MMOL/L (ref 5–15)
ANION GAP SERPL CALCULATED.3IONS-SCNC: 9 MMOL/L (ref 5–15)
ANION GAP SERPL CALCULATED.3IONS-SCNC: 9.7 MMOL/L (ref 5–15)
AORTIC DIMENSIONLESS INDEX: 0.7 (DI)
AST SERPL-CCNC: 137 U/L (ref 1–32)
AST SERPL-CCNC: 87 U/L (ref 1–32)
ASTRO TYP 1-8 RNA STL QL NAA+NON-PROBE: NOT DETECTED
B PARAPERT DNA SPEC QL NAA+PROBE: NOT DETECTED
B PERT DNA SPEC QL NAA+PROBE: NOT DETECTED
BACTERIA SPEC AEROBE CULT: NO GROWTH
BACTERIA UR QL AUTO: ABNORMAL /HPF
BACTERIA UR QL AUTO: ABNORMAL /HPF
BASOPHILS # BLD AUTO: 0.03 10*3/MM3 (ref 0–0.2)
BASOPHILS NFR BLD AUTO: 0.3 % (ref 0–1.5)
BH CV ECHO MEAS - AO MAX PG (FULL): 5 MMHG
BH CV ECHO MEAS - AO MAX PG: 9.2 MMHG
BH CV ECHO MEAS - AO MEAN PG (FULL): 3 MMHG
BH CV ECHO MEAS - AO MEAN PG: 5 MMHG
BH CV ECHO MEAS - AO V2 MAX: 152 CM/SEC
BH CV ECHO MEAS - AO V2 MEAN: 102 CM/SEC
BH CV ECHO MEAS - AO V2 VTI: 26.6 CM
BH CV ECHO MEAS - AVA(I,A): 2.1 CM^2
BH CV ECHO MEAS - AVA(I,D): 2.1 CM^2
BH CV ECHO MEAS - AVA(V,A): 2.1 CM^2
BH CV ECHO MEAS - AVA(V,D): 2.1 CM^2
BH CV ECHO MEAS - BSA(HAYCOCK): 2 M^2
BH CV ECHO MEAS - BSA: 1.9 M^2
BH CV ECHO MEAS - BZI_BMI: 31.1 KILOGRAMS/M^2
BH CV ECHO MEAS - BZI_METRIC_HEIGHT: 162.6 CM
BH CV ECHO MEAS - BZI_METRIC_WEIGHT: 82.1 KG
BH CV ECHO MEAS - EDV(CUBED): 59.3 ML
BH CV ECHO MEAS - EDV(MOD-SP2): 70 ML
BH CV ECHO MEAS - EDV(MOD-SP4): 72 ML
BH CV ECHO MEAS - EDV(TEICH): 65.9 ML
BH CV ECHO MEAS - EF(CUBED): 79.5 %
BH CV ECHO MEAS - EF(MOD-BP): 70 %
BH CV ECHO MEAS - EF(MOD-SP2): 67.1 %
BH CV ECHO MEAS - EF(MOD-SP4): 70.8 %
BH CV ECHO MEAS - EF(TEICH): 72.5 %
BH CV ECHO MEAS - ESV(CUBED): 12.2 ML
BH CV ECHO MEAS - ESV(MOD-SP2): 23 ML
BH CV ECHO MEAS - ESV(MOD-SP4): 21 ML
BH CV ECHO MEAS - ESV(TEICH): 18.1 ML
BH CV ECHO MEAS - FS: 41 %
BH CV ECHO MEAS - IVS/LVPW: 0.91
BH CV ECHO MEAS - IVSD: 1 CM
BH CV ECHO MEAS - LV DIASTOLIC VOL/BSA (35-75): 38.4 ML/M^2
BH CV ECHO MEAS - LV MASS(C)D: 131 GRAMS
BH CV ECHO MEAS - LV MASS(C)DI: 69.9 GRAMS/M^2
BH CV ECHO MEAS - LV MAX PG: 4.2 MMHG
BH CV ECHO MEAS - LV MEAN PG: 2 MMHG
BH CV ECHO MEAS - LV SYSTOLIC VOL/BSA (12-30): 11.2 ML/M^2
BH CV ECHO MEAS - LV V1 MAX: 103 CM/SEC
BH CV ECHO MEAS - LV V1 MEAN: 67.8 CM/SEC
BH CV ECHO MEAS - LV V1 VTI: 18 CM
BH CV ECHO MEAS - LVIDD: 3.9 CM
BH CV ECHO MEAS - LVIDS: 2.3 CM
BH CV ECHO MEAS - LVLD AP2: 7.2 CM
BH CV ECHO MEAS - LVLD AP4: 7.5 CM
BH CV ECHO MEAS - LVLS AP2: 6.1 CM
BH CV ECHO MEAS - LVLS AP4: 6.5 CM
BH CV ECHO MEAS - LVOT AREA (M): 3.1 CM^2
BH CV ECHO MEAS - LVOT AREA: 3.1 CM^2
BH CV ECHO MEAS - LVOT DIAM: 2 CM
BH CV ECHO MEAS - LVPWD: 1.1 CM
BH CV ECHO MEAS - MV DEC SLOPE: 618.5 CM/SEC^2
BH CV ECHO MEAS - MV DEC TIME: 246 SEC
BH CV ECHO MEAS - MV E MAX VEL: 121 CM/SEC
BH CV ECHO MEAS - MV MAX PG: 8.5 MMHG
BH CV ECHO MEAS - MV MEAN PG: 4 MMHG
BH CV ECHO MEAS - MV P1/2T MAX VEL: 159 CM/SEC
BH CV ECHO MEAS - MV P1/2T: 75.3 MSEC
BH CV ECHO MEAS - MV V2 MAX: 146 CM/SEC
BH CV ECHO MEAS - MV V2 MEAN: 84.2 CM/SEC
BH CV ECHO MEAS - MV V2 VTI: 24 CM
BH CV ECHO MEAS - MVA P1/2T LCG: 1.4 CM^2
BH CV ECHO MEAS - MVA(P1/2T): 2.9 CM^2
BH CV ECHO MEAS - MVA(VTI): 2.4 CM^2
BH CV ECHO MEAS - PA ACC TIME: 0.05 SEC
BH CV ECHO MEAS - PA MAX PG (FULL): 2.3 MMHG
BH CV ECHO MEAS - PA MAX PG: 4.2 MMHG
BH CV ECHO MEAS - PA PR(ACCEL): 55.2 MMHG
BH CV ECHO MEAS - PA V2 MAX: 102 CM/SEC
BH CV ECHO MEAS - PULM DIAS VEL: 43.8 CM/SEC
BH CV ECHO MEAS - PULM S/D: 0.95
BH CV ECHO MEAS - PULM SYS VEL: 41.4 CM/SEC
BH CV ECHO MEAS - RAP SYSTOLE: 3 MMHG
BH CV ECHO MEAS - RV MAX PG: 1.8 MMHG
BH CV ECHO MEAS - RV MEAN PG: 1 MMHG
BH CV ECHO MEAS - RV V1 MAX: 68 CM/SEC
BH CV ECHO MEAS - RV V1 MEAN: 41.5 CM/SEC
BH CV ECHO MEAS - RV V1 VTI: 10.2 CM
BH CV ECHO MEAS - RVSP: 50 MMHG
BH CV ECHO MEAS - SI(CUBED): 25.2 ML/M^2
BH CV ECHO MEAS - SI(LVOT): 30.2 ML/M^2
BH CV ECHO MEAS - SI(MOD-SP2): 25.1 ML/M^2
BH CV ECHO MEAS - SI(MOD-SP4): 27.2 ML/M^2
BH CV ECHO MEAS - SI(TEICH): 25.5 ML/M^2
BH CV ECHO MEAS - SV(CUBED): 47.2 ML
BH CV ECHO MEAS - SV(LVOT): 56.5 ML
BH CV ECHO MEAS - SV(MOD-SP2): 47 ML
BH CV ECHO MEAS - SV(MOD-SP4): 51 ML
BH CV ECHO MEAS - SV(TEICH): 47.8 ML
BH CV ECHO MEAS - TR MAX PG: 47 MMHG
BH CV ECHO MEAS - TR MAX VEL: 341 CM/SEC
BILIRUB SERPL-MCNC: 0.5 MG/DL (ref 0.2–1.2)
BILIRUB SERPL-MCNC: 0.6 MG/DL (ref 0–1.2)
BILIRUB UR QL STRIP: NEGATIVE
BILIRUB UR QL STRIP: NEGATIVE
BLD GP AB SCN SERPL QL: NEGATIVE
BUN BLD-MCNC: 19 MG/DL (ref 8–23)
BUN BLD-MCNC: 24 MG/DL (ref 8–23)
BUN BLD-MCNC: 26 MG/DL (ref 8–23)
BUN BLD-MCNC: 28 MG/DL (ref 8–23)
BUN BLD-MCNC: 31 MG/DL (ref 8–23)
BUN BLD-MCNC: 31 MG/DL (ref 8–23)
BUN BLD-MCNC: 32 MG/DL (ref 8–23)
BUN BLD-MCNC: 36 MG/DL (ref 8–23)
BUN SERPL-MCNC: 25 MG/DL (ref 8–23)
BUN/CREAT SERPL: 14.7 (ref 7–25)
BUN/CREAT SERPL: 25.8 (ref 7–25)
BUN/CREAT SERPL: 25.9 (ref 7–25)
BUN/CREAT SERPL: 26 (ref 7–25)
BUN/CREAT SERPL: 26.3 (ref 7–25)
BUN/CREAT SERPL: 26.5 (ref 7–25)
BUN/CREAT SERPL: 28.9 (ref 7–25)
BUN/CREAT SERPL: 32.4 (ref 7–25)
BUN/CREAT SERPL: 35.2 (ref 7–25)
C CAYETANENSIS DNA STL QL NAA+NON-PROBE: NOT DETECTED
C DIFF TOX GENS STL QL NAA+PROBE: NEGATIVE
C PNEUM DNA NPH QL NAA+NON-PROBE: NOT DETECTED
CALCIUM SPEC-SCNC: 8.3 MG/DL (ref 8.6–10.5)
CALCIUM SPEC-SCNC: 8.4 MG/DL (ref 8.6–10.5)
CALCIUM SPEC-SCNC: 8.6 MG/DL (ref 8.6–10.5)
CALCIUM SPEC-SCNC: 8.8 MG/DL (ref 8.6–10.5)
CALCIUM SPEC-SCNC: 8.8 MG/DL (ref 8.6–10.5)
CALCIUM SPEC-SCNC: 9.3 MG/DL (ref 8.6–10.5)
CALCIUM SPEC-SCNC: 9.5 MG/DL (ref 8.6–10.5)
CAMPY SP DNA.DIARRHEA STL QL NAA+PROBE: NOT DETECTED
CHLORIDE SERPL-SCNC: 100 MMOL/L (ref 98–107)
CHLORIDE SERPL-SCNC: 101 MMOL/L (ref 98–107)
CHLORIDE SERPL-SCNC: 102 MMOL/L (ref 98–107)
CHLORIDE SERPL-SCNC: 103 MMOL/L (ref 98–107)
CHLORIDE SERPL-SCNC: 103 MMOL/L (ref 98–107)
CHLORIDE SERPL-SCNC: 110 MMOL/L (ref 98–107)
CHLORIDE SERPL-SCNC: 95 MMOL/L (ref 98–107)
CHLORIDE SERPL-SCNC: 98 MMOL/L (ref 98–107)
CHLORIDE SERPL-SCNC: 99 MMOL/L (ref 98–107)
CLARITY UR: ABNORMAL
CLARITY UR: ABNORMAL
CO2 SERPL-SCNC: 17.6 MMOL/L (ref 22–29)
CO2 SERPL-SCNC: 20.4 MMOL/L (ref 22–29)
CO2 SERPL-SCNC: 23.6 MMOL/L (ref 22–29)
CO2 SERPL-SCNC: 23.7 MMOL/L (ref 22–29)
CO2 SERPL-SCNC: 24.2 MMOL/L (ref 22–29)
CO2 SERPL-SCNC: 25.3 MMOL/L (ref 22–29)
CO2 SERPL-SCNC: 25.7 MMOL/L (ref 22–29)
CO2 SERPL-SCNC: 26 MMOL/L (ref 22–29)
CO2 SERPL-SCNC: 26 MMOL/L (ref 22–29)
COLOR UR: ABNORMAL
COLOR UR: YELLOW
CREAT BLD-MCNC: 0.73 MG/DL (ref 0.57–1)
CREAT BLD-MCNC: 0.74 MG/DL (ref 0.57–1)
CREAT BLD-MCNC: 0.88 MG/DL (ref 0.57–1)
CREAT BLD-MCNC: 0.97 MG/DL (ref 0.57–1)
CREAT BLD-MCNC: 0.99 MG/DL (ref 0.57–1)
CREAT BLD-MCNC: 1.17 MG/DL (ref 0.57–1)
CREAT BLD-MCNC: 1.24 MG/DL (ref 0.57–1)
CREAT BLD-MCNC: 1.39 MG/DL (ref 0.57–1)
CREAT SERPL-MCNC: 1.7 MG/DL (ref 0.57–1)
CRYPTOSP STL CULT: NOT DETECTED
D-LACTATE SERPL-SCNC: 6.3 MMOL/L (ref 0.5–2)
DEPRECATED RDW RBC AUTO: 40.6 FL (ref 37–54)
DEPRECATED RDW RBC AUTO: 40.7 FL (ref 37–54)
DEPRECATED RDW RBC AUTO: 40.9 FL (ref 37–54)
DEPRECATED RDW RBC AUTO: 41.5 FL (ref 37–54)
DEPRECATED RDW RBC AUTO: 41.8 FL (ref 37–54)
DEPRECATED RDW RBC AUTO: 42.3 FL (ref 37–54)
DEPRECATED RDW RBC AUTO: 42.5 FL (ref 37–54)
DEPRECATED RDW RBC AUTO: 42.6 FL (ref 37–54)
DEPRECATED RDW RBC AUTO: 44.2 FL (ref 37–54)
E COLI DNA SPEC QL NAA+PROBE: NOT DETECTED
E HISTOLYT AG STL-ACNC: NOT DETECTED
EAEC PAA PLAS AGGR+AATA ST NAA+NON-PRB: NOT DETECTED
EC STX1 + STX2 GENES STL NAA+PROBE: NOT DETECTED
EOSINOPHIL # BLD AUTO: 0.15 10*3/MM3 (ref 0–0.4)
EOSINOPHIL NFR BLD AUTO: 1.3 % (ref 0.3–6.2)
EPEC EAE GENE STL QL NAA+NON-PROBE: NOT DETECTED
ERYTHROCYTE [DISTWIDTH] IN BLOOD BY AUTOMATED COUNT: 14.4 % (ref 12.3–15.4)
ERYTHROCYTE [DISTWIDTH] IN BLOOD BY AUTOMATED COUNT: 14.4 % (ref 12.3–15.4)
ERYTHROCYTE [DISTWIDTH] IN BLOOD BY AUTOMATED COUNT: 14.6 % (ref 12.3–15.4)
ERYTHROCYTE [DISTWIDTH] IN BLOOD BY AUTOMATED COUNT: 14.7 % (ref 12.3–15.4)
ERYTHROCYTE [DISTWIDTH] IN BLOOD BY AUTOMATED COUNT: 14.8 % (ref 12.3–15.4)
ERYTHROCYTE [DISTWIDTH] IN BLOOD BY AUTOMATED COUNT: 14.8 % (ref 12.3–15.4)
ERYTHROCYTE [DISTWIDTH] IN BLOOD BY AUTOMATED COUNT: 14.9 % (ref 12.3–15.4)
ETEC LTA+ST1A+ST1B TOX ST NAA+NON-PROBE: NOT DETECTED
FLUAV H1 2009 PAND RNA NPH QL NAA+PROBE: NOT DETECTED
FLUAV H1 HA GENE NPH QL NAA+PROBE: NOT DETECTED
FLUAV H3 RNA NPH QL NAA+PROBE: NOT DETECTED
FLUAV SUBTYP SPEC NAA+PROBE: NOT DETECTED
FLUBV RNA ISLT QL NAA+PROBE: NOT DETECTED
G LAMBLIA DNA SPEC QL NAA+PROBE: NOT DETECTED
GFR SERPL CREATININE-BSD FRML MDRD: 29 ML/MIN/1.73
GFR SERPL CREATININE-BSD FRML MDRD: 36 ML/MIN/1.73
GFR SERPL CREATININE-BSD FRML MDRD: 41 ML/MIN/1.73
GFR SERPL CREATININE-BSD FRML MDRD: 44 ML/MIN/1.73
GFR SERPL CREATININE-BSD FRML MDRD: 53 ML/MIN/1.73
GFR SERPL CREATININE-BSD FRML MDRD: 55 ML/MIN/1.73
GFR SERPL CREATININE-BSD FRML MDRD: 61 ML/MIN/1.73
GFR SERPL CREATININE-BSD FRML MDRD: 75 ML/MIN/1.73
GFR SERPL CREATININE-BSD FRML MDRD: 76 ML/MIN/1.73
GLOBULIN UR ELPH-MCNC: 2.5 GM/DL
GLOBULIN UR ELPH-MCNC: 3 GM/DL
GLUCOSE BLD-MCNC: 137 MG/DL (ref 65–99)
GLUCOSE BLD-MCNC: 146 MG/DL (ref 65–99)
GLUCOSE BLD-MCNC: 150 MG/DL (ref 65–99)
GLUCOSE BLD-MCNC: 186 MG/DL (ref 65–99)
GLUCOSE BLD-MCNC: 220 MG/DL (ref 65–99)
GLUCOSE BLD-MCNC: 260 MG/DL (ref 65–99)
GLUCOSE BLD-MCNC: 261 MG/DL (ref 65–99)
GLUCOSE BLD-MCNC: 308 MG/DL (ref 65–99)
GLUCOSE BLDC GLUCOMTR-MCNC: 109 MG/DL (ref 70–130)
GLUCOSE BLDC GLUCOMTR-MCNC: 116 MG/DL (ref 70–130)
GLUCOSE BLDC GLUCOMTR-MCNC: 118 MG/DL (ref 70–130)
GLUCOSE BLDC GLUCOMTR-MCNC: 122 MG/DL (ref 70–130)
GLUCOSE BLDC GLUCOMTR-MCNC: 130 MG/DL (ref 70–130)
GLUCOSE BLDC GLUCOMTR-MCNC: 137 MG/DL (ref 70–130)
GLUCOSE BLDC GLUCOMTR-MCNC: 148 MG/DL (ref 70–130)
GLUCOSE BLDC GLUCOMTR-MCNC: 160 MG/DL (ref 70–130)
GLUCOSE BLDC GLUCOMTR-MCNC: 160 MG/DL (ref 70–130)
GLUCOSE BLDC GLUCOMTR-MCNC: 170 MG/DL (ref 70–130)
GLUCOSE BLDC GLUCOMTR-MCNC: 180 MG/DL (ref 70–130)
GLUCOSE BLDC GLUCOMTR-MCNC: 180 MG/DL (ref 70–130)
GLUCOSE BLDC GLUCOMTR-MCNC: 206 MG/DL (ref 70–130)
GLUCOSE BLDC GLUCOMTR-MCNC: 209 MG/DL (ref 70–130)
GLUCOSE BLDC GLUCOMTR-MCNC: 226 MG/DL (ref 70–130)
GLUCOSE BLDC GLUCOMTR-MCNC: 233 MG/DL (ref 70–130)
GLUCOSE BLDC GLUCOMTR-MCNC: 245 MG/DL (ref 70–130)
GLUCOSE BLDC GLUCOMTR-MCNC: 250 MG/DL (ref 70–130)
GLUCOSE BLDC GLUCOMTR-MCNC: 268 MG/DL (ref 70–130)
GLUCOSE BLDC GLUCOMTR-MCNC: 271 MG/DL (ref 70–130)
GLUCOSE BLDC GLUCOMTR-MCNC: 273 MG/DL (ref 70–130)
GLUCOSE BLDC GLUCOMTR-MCNC: 279 MG/DL (ref 70–130)
GLUCOSE BLDC GLUCOMTR-MCNC: 279 MG/DL (ref 70–130)
GLUCOSE BLDC GLUCOMTR-MCNC: 318 MG/DL (ref 70–130)
GLUCOSE BLDC GLUCOMTR-MCNC: 364 MG/DL (ref 70–130)
GLUCOSE BLDC GLUCOMTR-MCNC: 99 MG/DL (ref 70–130)
GLUCOSE SERPL-MCNC: 109 MG/DL (ref 65–99)
GLUCOSE UR STRIP-MCNC: ABNORMAL MG/DL
GLUCOSE UR STRIP-MCNC: NEGATIVE MG/DL
HADV DNA SPEC NAA+PROBE: NOT DETECTED
HBA1C MFR BLD: 7.4 % (ref 4.8–5.6)
HCOV 229E RNA SPEC QL NAA+PROBE: NOT DETECTED
HCOV HKU1 RNA SPEC QL NAA+PROBE: NOT DETECTED
HCOV NL63 RNA SPEC QL NAA+PROBE: NOT DETECTED
HCOV OC43 RNA SPEC QL NAA+PROBE: NOT DETECTED
HCT VFR BLD AUTO: 30.4 % (ref 34–46.6)
HCT VFR BLD AUTO: 30.7 % (ref 34–46.6)
HCT VFR BLD AUTO: 31.1 % (ref 34–46.6)
HCT VFR BLD AUTO: 31.1 % (ref 34–46.6)
HCT VFR BLD AUTO: 32.1 % (ref 34–46.6)
HCT VFR BLD AUTO: 33.7 % (ref 34–46.6)
HCT VFR BLD AUTO: 35.8 % (ref 34–46.6)
HCT VFR BLD AUTO: 36.6 % (ref 34–46.6)
HCT VFR BLD AUTO: 37.3 % (ref 34–46.6)
HGB BLD-MCNC: 10 G/DL (ref 12–15.9)
HGB BLD-MCNC: 10.2 G/DL (ref 12–15.9)
HGB BLD-MCNC: 10.5 G/DL (ref 12–15.9)
HGB BLD-MCNC: 10.9 G/DL (ref 12–15.9)
HGB BLD-MCNC: 11.5 G/DL (ref 12–15.9)
HGB BLD-MCNC: 9.2 G/DL (ref 12–15.9)
HGB BLD-MCNC: 9.3 G/DL (ref 12–15.9)
HGB BLD-MCNC: 9.4 G/DL (ref 12–15.9)
HGB BLD-MCNC: 9.5 G/DL (ref 12–15.9)
HGB UR QL STRIP.AUTO: ABNORMAL
HGB UR QL STRIP.AUTO: NEGATIVE
HMPV RNA NPH QL NAA+NON-PROBE: NOT DETECTED
HPIV1 RNA SPEC QL NAA+PROBE: NOT DETECTED
HPIV2 RNA SPEC QL NAA+PROBE: NOT DETECTED
HPIV3 RNA NPH QL NAA+PROBE: NOT DETECTED
HPIV4 P GENE NPH QL NAA+PROBE: NOT DETECTED
HYALINE CASTS UR QL AUTO: ABNORMAL /LPF
HYALINE CASTS UR QL AUTO: ABNORMAL /LPF
IMM GRANULOCYTES # BLD AUTO: 0.04 10*3/MM3 (ref 0–0.05)
IMM GRANULOCYTES NFR BLD AUTO: 0.3 % (ref 0–0.5)
INR PPP: 2.81 (ref 0.9–1.1)
KETONES UR QL STRIP: ABNORMAL
KETONES UR QL STRIP: NEGATIVE
LEFT ATRIUM VOLUME INDEX: 38.4 ML/M2
LEUKOCYTE ESTERASE UR QL STRIP.AUTO: ABNORMAL
LEUKOCYTE ESTERASE UR QL STRIP.AUTO: ABNORMAL
LYMPHOCYTES # BLD AUTO: 1.21 10*3/MM3 (ref 0.7–3.1)
LYMPHOCYTES # BLD MANUAL: 0.49 10*3/MM3 (ref 0.7–3.1)
LYMPHOCYTES NFR BLD AUTO: 10.5 % (ref 19.6–45.3)
LYMPHOCYTES NFR BLD MANUAL: 10 % (ref 19.6–45.3)
LYMPHOCYTES NFR BLD MANUAL: 4 % (ref 5–12)
M PNEUMO IGG SER IA-ACNC: NOT DETECTED
MAXIMAL PREDICTED HEART RATE: 136 BPM
MCH RBC QN AUTO: 23.2 PG (ref 26.6–33)
MCH RBC QN AUTO: 23.6 PG (ref 26.6–33)
MCH RBC QN AUTO: 23.6 PG (ref 26.6–33)
MCH RBC QN AUTO: 23.9 PG (ref 26.6–33)
MCH RBC QN AUTO: 23.9 PG (ref 26.6–33)
MCH RBC QN AUTO: 24 PG (ref 26.6–33)
MCH RBC QN AUTO: 24 PG (ref 26.6–33)
MCH RBC QN AUTO: 24.1 PG (ref 26.6–33)
MCH RBC QN AUTO: 24.4 PG (ref 26.6–33)
MCHC RBC AUTO-ENTMCNC: 28.7 G/DL (ref 31.5–35.7)
MCHC RBC AUTO-ENTMCNC: 29.9 G/DL (ref 31.5–35.7)
MCHC RBC AUTO-ENTMCNC: 30.3 G/DL (ref 31.5–35.7)
MCHC RBC AUTO-ENTMCNC: 30.3 G/DL (ref 31.5–35.7)
MCHC RBC AUTO-ENTMCNC: 30.4 G/DL (ref 31.5–35.7)
MCHC RBC AUTO-ENTMCNC: 30.5 G/DL (ref 31.5–35.7)
MCHC RBC AUTO-ENTMCNC: 30.6 G/DL (ref 31.5–35.7)
MCHC RBC AUTO-ENTMCNC: 30.8 G/DL (ref 31.5–35.7)
MCHC RBC AUTO-ENTMCNC: 31.2 G/DL (ref 31.5–35.7)
MCV RBC AUTO: 77 FL (ref 79–97)
MCV RBC AUTO: 77.4 FL (ref 79–97)
MCV RBC AUTO: 77.9 FL (ref 79–97)
MCV RBC AUTO: 78.5 FL (ref 79–97)
MCV RBC AUTO: 78.9 FL (ref 79–97)
MCV RBC AUTO: 79.2 FL (ref 79–97)
MCV RBC AUTO: 79.3 FL (ref 79–97)
MCV RBC AUTO: 79.6 FL (ref 79–97)
MCV RBC AUTO: 80.8 FL (ref 79–97)
MONOCYTES # BLD AUTO: 0.2 10*3/MM3 (ref 0.1–0.9)
MONOCYTES # BLD AUTO: 0.78 10*3/MM3 (ref 0.1–0.9)
MONOCYTES NFR BLD AUTO: 6.8 % (ref 5–12)
NEUTROPHILS # BLD AUTO: 4.23 10*3/MM3 (ref 1.7–7)
NEUTROPHILS # BLD AUTO: 9.26 10*3/MM3 (ref 1.7–7)
NEUTROPHILS NFR BLD AUTO: 80.8 % (ref 42.7–76)
NEUTROPHILS NFR BLD MANUAL: 86 % (ref 42.7–76)
NITRITE UR QL STRIP: NEGATIVE
NITRITE UR QL STRIP: NEGATIVE
NOROVIRUS GI+II RNA STL QL NAA+NON-PROBE: NOT DETECTED
NRBC BLD AUTO-RTO: 0 /100 WBC (ref 0–0.2)
P SHIGELLOIDES DNA STL QL NAA+PROBE: NOT DETECTED
PH UR STRIP.AUTO: <=5 [PH] (ref 5–8)
PH UR STRIP.AUTO: <=5 [PH] (ref 5–8)
PLAT MORPH BLD: NORMAL
PLATELET # BLD AUTO: 180 10*3/MM3 (ref 140–450)
PLATELET # BLD AUTO: 183 10*3/MM3 (ref 140–450)
PLATELET # BLD AUTO: 211 10*3/MM3 (ref 140–450)
PLATELET # BLD AUTO: 218 10*3/MM3 (ref 140–450)
PLATELET # BLD AUTO: 219 10*3/MM3 (ref 140–450)
PLATELET # BLD AUTO: 238 10*3/MM3 (ref 140–450)
PLATELET # BLD AUTO: 263 10*3/MM3 (ref 140–450)
PLATELET # BLD AUTO: 266 10*3/MM3 (ref 140–450)
PLATELET # BLD AUTO: 334 10*3/MM3 (ref 140–450)
PMV BLD AUTO: 10.8 FL (ref 6–12)
PMV BLD AUTO: 11.2 FL (ref 6–12)
PMV BLD AUTO: 11.3 FL (ref 6–12)
PMV BLD AUTO: 11.7 FL (ref 6–12)
PMV BLD AUTO: 11.9 FL (ref 6–12)
PMV BLD AUTO: 12 FL (ref 6–12)
PMV BLD AUTO: 12 FL (ref 6–12)
PMV BLD AUTO: 12.1 FL (ref 6–12)
PMV BLD AUTO: 12.3 FL (ref 6–12)
POIKILOCYTOSIS BLD QL SMEAR: ABNORMAL
POTASSIUM BLD-SCNC: 4.2 MMOL/L (ref 3.5–5.2)
POTASSIUM BLD-SCNC: 4.3 MMOL/L (ref 3.5–5.2)
POTASSIUM BLD-SCNC: 4.4 MMOL/L (ref 3.5–5.2)
POTASSIUM BLD-SCNC: 4.5 MMOL/L (ref 3.5–5.2)
POTASSIUM BLD-SCNC: 4.5 MMOL/L (ref 3.5–5.2)
POTASSIUM BLD-SCNC: 4.6 MMOL/L (ref 3.5–5.2)
POTASSIUM BLD-SCNC: 5 MMOL/L (ref 3.5–5.2)
POTASSIUM BLD-SCNC: 5.2 MMOL/L (ref 3.5–5.2)
POTASSIUM SERPL-SCNC: 5.4 MMOL/L (ref 3.5–5.2)
PROCALCITONIN SERPL-MCNC: 6.84 NG/ML (ref 0–0.25)
PROT SERPL-MCNC: 5 G/DL (ref 6–8.5)
PROT SERPL-MCNC: 6.6 G/DL (ref 6–8.5)
PROT UR QL STRIP: ABNORMAL
PROT UR QL STRIP: ABNORMAL
PROTHROMBIN TIME: 28.7 SECONDS (ref 11.7–14.2)
RBC # BLD AUTO: 3.82 10*6/MM3 (ref 3.77–5.28)
RBC # BLD AUTO: 3.94 10*6/MM3 (ref 3.77–5.28)
RBC # BLD AUTO: 3.94 10*6/MM3 (ref 3.77–5.28)
RBC # BLD AUTO: 4.02 10*6/MM3 (ref 3.77–5.28)
RBC # BLD AUTO: 4.17 10*6/MM3 (ref 3.77–5.28)
RBC # BLD AUTO: 4.25 10*6/MM3 (ref 3.77–5.28)
RBC # BLD AUTO: 4.53 10*6/MM3 (ref 3.77–5.28)
RBC # BLD AUTO: 4.56 10*6/MM3 (ref 3.77–5.28)
RBC # BLD AUTO: 4.71 10*6/MM3 (ref 3.77–5.28)
RBC # UR: ABNORMAL /HPF
RBC # UR: ABNORMAL /HPF
REF LAB TEST METHOD: ABNORMAL
REF LAB TEST METHOD: ABNORMAL
RH BLD: POSITIVE
RHINOVIRUS RNA SPEC NAA+PROBE: NOT DETECTED
RSV RNA NPH QL NAA+NON-PROBE: NOT DETECTED
RV RNA STL NAA+PROBE: NOT DETECTED
SALMONELLA DNA SPEC QL NAA+PROBE: NOT DETECTED
SAPO I+II+IV+V RNA STL QL NAA+NON-PROBE: NOT DETECTED
SARS-COV-2 RNA NPH QL NAA+NON-PROBE: NOT DETECTED
SARS-COV-2 RNA RESP QL NAA+PROBE: NOT DETECTED
SHIGELLA SP+EIEC IPAH STL QL NAA+PROBE: NOT DETECTED
SODIUM BLD-SCNC: 130 MMOL/L (ref 136–145)
SODIUM BLD-SCNC: 135 MMOL/L (ref 136–145)
SODIUM BLD-SCNC: 136 MMOL/L (ref 136–145)
SODIUM BLD-SCNC: 138 MMOL/L (ref 136–145)
SODIUM SERPL-SCNC: 143 MMOL/L (ref 136–145)
SP GR UR STRIP: 1.02 (ref 1–1.03)
SP GR UR STRIP: 1.02 (ref 1–1.03)
SQUAMOUS #/AREA URNS HPF: ABNORMAL /HPF
SQUAMOUS #/AREA URNS HPF: ABNORMAL /HPF
STRESS TARGET HR: 116 BPM
T&S EXPIRATION DATE: NORMAL
TROPONIN T SERPL-MCNC: 0.06 NG/ML (ref 0–0.03)
UROBILINOGEN UR QL STRIP: ABNORMAL
UROBILINOGEN UR QL STRIP: ABNORMAL
V CHOLERAE DNA SPEC QL NAA+PROBE: NOT DETECTED
VIBRIO DNA SPEC NAA+PROBE: NOT DETECTED
WBC # BLD AUTO: 4.92 10*3/MM3 (ref 3.4–10.8)
WBC MORPH BLD: NORMAL
WBC NRBC COR # BLD: 11.47 10*3/MM3 (ref 3.4–10.8)
WBC NRBC COR # BLD: 7.41 10*3/MM3 (ref 3.4–10.8)
WBC NRBC COR # BLD: 7.69 10*3/MM3 (ref 3.4–10.8)
WBC NRBC COR # BLD: 7.78 10*3/MM3 (ref 3.4–10.8)
WBC NRBC COR # BLD: 8.23 10*3/MM3 (ref 3.4–10.8)
WBC NRBC COR # BLD: 8.35 10*3/MM3 (ref 3.4–10.8)
WBC NRBC COR # BLD: 8.83 10*3/MM3 (ref 3.4–10.8)
WBC NRBC COR # BLD: 9.26 10*3/MM3 (ref 3.4–10.8)
WBC UR QL AUTO: ABNORMAL /HPF
WBC UR QL AUTO: ABNORMAL /HPF
YERSINIA STL CULT: NOT DETECTED

## 2020-01-01 PROCEDURE — 96361 HYDRATE IV INFUSION ADD-ON: CPT

## 2020-01-01 PROCEDURE — 63710000001 INSULIN GLARGINE PER 5 UNITS: Performed by: INTERNAL MEDICINE

## 2020-01-01 PROCEDURE — 94799 UNLISTED PULMONARY SVC/PX: CPT

## 2020-01-01 PROCEDURE — 70450 CT HEAD/BRAIN W/O DYE: CPT

## 2020-01-01 PROCEDURE — 73502 X-RAY EXAM HIP UNI 2-3 VIEWS: CPT

## 2020-01-01 PROCEDURE — 63710000001 INSULIN LISPRO (HUMAN) PER 5 UNITS: Performed by: ORTHOPAEDIC SURGERY

## 2020-01-01 PROCEDURE — 99285 EMERGENCY DEPT VISIT HI MDM: CPT

## 2020-01-01 PROCEDURE — 97162 PT EVAL MOD COMPLEX 30 MIN: CPT

## 2020-01-01 PROCEDURE — 87181 SC STD AGAR DILUTION PER AGT: CPT | Performed by: EMERGENCY MEDICINE

## 2020-01-01 PROCEDURE — 25010000002 ONDANSETRON PER 1 MG: Performed by: NURSE ANESTHETIST, CERTIFIED REGISTERED

## 2020-01-01 PROCEDURE — 87493 C DIFF AMPLIFIED PROBE: CPT | Performed by: EMERGENCY MEDICINE

## 2020-01-01 PROCEDURE — 25010000003 CEFAZOLIN IN DEXTROSE 2-4 GM/100ML-% SOLUTION: Performed by: ORTHOPAEDIC SURGERY

## 2020-01-01 PROCEDURE — 97110 THERAPEUTIC EXERCISES: CPT

## 2020-01-01 PROCEDURE — 84484 ASSAY OF TROPONIN QUANT: CPT | Performed by: EMERGENCY MEDICINE

## 2020-01-01 PROCEDURE — 80048 BASIC METABOLIC PNL TOTAL CA: CPT | Performed by: INTERNAL MEDICINE

## 2020-01-01 PROCEDURE — 84145 PROCALCITONIN (PCT): CPT | Performed by: EMERGENCY MEDICINE

## 2020-01-01 PROCEDURE — 25010000002 PIPERACILLIN SOD-TAZOBACTAM PER 1 G: Performed by: EMERGENCY MEDICINE

## 2020-01-01 PROCEDURE — 81001 URINALYSIS AUTO W/SCOPE: CPT | Performed by: INTERNAL MEDICINE

## 2020-01-01 PROCEDURE — 82962 GLUCOSE BLOOD TEST: CPT

## 2020-01-01 PROCEDURE — 25010000002 NEOSTIGMINE 5 MG/10ML SOLUTION: Performed by: NURSE ANESTHETIST, CERTIFIED REGISTERED

## 2020-01-01 PROCEDURE — 93010 ELECTROCARDIOGRAM REPORT: CPT | Performed by: INTERNAL MEDICINE

## 2020-01-01 PROCEDURE — 86850 RBC ANTIBODY SCREEN: CPT | Performed by: EMERGENCY MEDICINE

## 2020-01-01 PROCEDURE — 25010000002 ONDANSETRON PER 1 MG: Performed by: EMERGENCY MEDICINE

## 2020-01-01 PROCEDURE — 87086 URINE CULTURE/COLONY COUNT: CPT | Performed by: EMERGENCY MEDICINE

## 2020-01-01 PROCEDURE — 87088 URINE BACTERIA CULTURE: CPT | Performed by: EMERGENCY MEDICINE

## 2020-01-01 PROCEDURE — 87086 URINE CULTURE/COLONY COUNT: CPT | Performed by: INTERNAL MEDICINE

## 2020-01-01 PROCEDURE — 85027 COMPLETE CBC AUTOMATED: CPT | Performed by: INTERNAL MEDICINE

## 2020-01-01 PROCEDURE — 25010000002 MORPHINE PER 10 MG: Performed by: ORTHOPAEDIC SURGERY

## 2020-01-01 PROCEDURE — C1713 ANCHOR/SCREW BN/BN,TIS/BN: HCPCS | Performed by: ORTHOPAEDIC SURGERY

## 2020-01-01 PROCEDURE — P9612 CATHETERIZE FOR URINE SPEC: HCPCS

## 2020-01-01 PROCEDURE — 36415 COLL VENOUS BLD VENIPUNCTURE: CPT

## 2020-01-01 PROCEDURE — 86901 BLOOD TYPING SEROLOGIC RH(D): CPT | Performed by: EMERGENCY MEDICINE

## 2020-01-01 PROCEDURE — 85610 PROTHROMBIN TIME: CPT | Performed by: EMERGENCY MEDICINE

## 2020-01-01 PROCEDURE — 94640 AIRWAY INHALATION TREATMENT: CPT

## 2020-01-01 PROCEDURE — 93005 ELECTROCARDIOGRAM TRACING: CPT | Performed by: EMERGENCY MEDICINE

## 2020-01-01 PROCEDURE — 83036 HEMOGLOBIN GLYCOSYLATED A1C: CPT | Performed by: INTERNAL MEDICINE

## 2020-01-01 PROCEDURE — 80053 COMPREHEN METABOLIC PANEL: CPT | Performed by: EMERGENCY MEDICINE

## 2020-01-01 PROCEDURE — 25010000002 MORPHINE PER 10 MG: Performed by: EMERGENCY MEDICINE

## 2020-01-01 PROCEDURE — 25010000002 ONDANSETRON PER 1 MG: Performed by: ORTHOPAEDIC SURGERY

## 2020-01-01 PROCEDURE — 25010000002 MORPHINE PER 10 MG: Performed by: INTERNAL MEDICINE

## 2020-01-01 PROCEDURE — 25010000002 CEFTRIAXONE PER 250 MG: Performed by: INTERNAL MEDICINE

## 2020-01-01 PROCEDURE — 96365 THER/PROPH/DIAG IV INF INIT: CPT

## 2020-01-01 PROCEDURE — 93306 TTE W/DOPPLER COMPLETE: CPT

## 2020-01-01 PROCEDURE — 71045 X-RAY EXAM CHEST 1 VIEW: CPT

## 2020-01-01 PROCEDURE — 25010000002 PHENYLEPHRINE PER 1 ML: Performed by: NURSE ANESTHETIST, CERTIFIED REGISTERED

## 2020-01-01 PROCEDURE — 25010000002 FENTANYL CITRATE (PF) 100 MCG/2ML SOLUTION: Performed by: NURSE ANESTHETIST, CERTIFIED REGISTERED

## 2020-01-01 PROCEDURE — 0202U NFCT DS 22 TRGT SARS-COV-2: CPT | Performed by: EMERGENCY MEDICINE

## 2020-01-01 PROCEDURE — 93306 TTE W/DOPPLER COMPLETE: CPT | Performed by: INTERNAL MEDICINE

## 2020-01-01 PROCEDURE — 85025 COMPLETE CBC W/AUTO DIFF WBC: CPT | Performed by: EMERGENCY MEDICINE

## 2020-01-01 PROCEDURE — 99284 EMERGENCY DEPT VISIT MOD MDM: CPT

## 2020-01-01 PROCEDURE — 99232 SBSQ HOSP IP/OBS MODERATE 35: CPT | Performed by: INTERNAL MEDICINE

## 2020-01-01 PROCEDURE — 99222 1ST HOSP IP/OBS MODERATE 55: CPT | Performed by: NURSE PRACTITIONER

## 2020-01-01 PROCEDURE — 36415 COLL VENOUS BLD VENIPUNCTURE: CPT | Performed by: EMERGENCY MEDICINE

## 2020-01-01 PROCEDURE — 25010000002 HYDRALAZINE PER 20 MG: Performed by: NURSE PRACTITIONER

## 2020-01-01 PROCEDURE — 0097U HC BIOFIRE FILMARRAY GI PANEL: CPT | Performed by: EMERGENCY MEDICINE

## 2020-01-01 PROCEDURE — 87186 SC STD MICRODIL/AGAR DIL: CPT | Performed by: EMERGENCY MEDICINE

## 2020-01-01 PROCEDURE — 81001 URINALYSIS AUTO W/SCOPE: CPT | Performed by: EMERGENCY MEDICINE

## 2020-01-01 PROCEDURE — 87147 CULTURE TYPE IMMUNOLOGIC: CPT | Performed by: EMERGENCY MEDICINE

## 2020-01-01 PROCEDURE — 87040 BLOOD CULTURE FOR BACTERIA: CPT | Performed by: EMERGENCY MEDICINE

## 2020-01-01 PROCEDURE — 87635 SARS-COV-2 COVID-19 AMP PRB: CPT | Performed by: ORTHOPAEDIC SURGERY

## 2020-01-01 PROCEDURE — 85007 BL SMEAR W/DIFF WBC COUNT: CPT | Performed by: EMERGENCY MEDICINE

## 2020-01-01 PROCEDURE — 0QS606Z REPOSITION RIGHT UPPER FEMUR WITH INTRAMEDULLARY INTERNAL FIXATION DEVICE, OPEN APPROACH: ICD-10-PCS | Performed by: ORTHOPAEDIC SURGERY

## 2020-01-01 PROCEDURE — 86900 BLOOD TYPING SEROLOGIC ABO: CPT | Performed by: EMERGENCY MEDICINE

## 2020-01-01 PROCEDURE — 25010000002 ZIPRASIDONE MESYLATE PER 10 MG: Performed by: INTERNAL MEDICINE

## 2020-01-01 PROCEDURE — 25010000002 PROPOFOL 10 MG/ML EMULSION: Performed by: NURSE ANESTHETIST, CERTIFIED REGISTERED

## 2020-01-01 PROCEDURE — 83605 ASSAY OF LACTIC ACID: CPT | Performed by: EMERGENCY MEDICINE

## 2020-01-01 PROCEDURE — 76000 FLUOROSCOPY <1 HR PHYS/QHP: CPT

## 2020-01-01 DEVICE — TRIGEN INTERTAN 10S 10MM X 18CM 130DEGREE
Type: IMPLANTABLE DEVICE | Site: HIP | Status: FUNCTIONAL
Brand: TRIGEN

## 2020-01-01 DEVICE — INTERTAN LAG/COMPRESSION SCREW KIT                                    95MM / 90MM
Type: IMPLANTABLE DEVICE | Site: HIP | Status: FUNCTIONAL
Brand: TRIGEN

## 2020-01-01 DEVICE — TRIGEN LOW PROFILE SCREW 5.0MM X 35MM
Type: IMPLANTABLE DEVICE | Site: HIP | Status: FUNCTIONAL
Brand: TRIGEN

## 2020-01-01 RX ORDER — GLYCOPYRROLATE 0.2 MG/ML
INJECTION INTRAMUSCULAR; INTRAVENOUS AS NEEDED
Status: DISCONTINUED | OUTPATIENT
Start: 2020-01-01 | End: 2020-01-01 | Stop reason: SURG

## 2020-01-01 RX ORDER — POLYETHYLENE GLYCOL 3350 17 G/17G
17 POWDER, FOR SOLUTION ORAL DAILY PRN
COMMUNITY
End: 2020-01-01 | Stop reason: HOSPADM

## 2020-01-01 RX ORDER — LISINOPRIL 40 MG/1
20 TABLET ORAL DAILY
Qty: 30 TABLET | Refills: 1 | Status: SHIPPED | OUTPATIENT
Start: 2020-01-01

## 2020-01-01 RX ORDER — TRAMADOL HYDROCHLORIDE 50 MG/1
50 TABLET ORAL EVERY 6 HOURS PRN
COMMUNITY
End: 2020-01-01 | Stop reason: HOSPADM

## 2020-01-01 RX ORDER — SODIUM CHLORIDE 0.9 % (FLUSH) 0.9 %
10 SYRINGE (ML) INJECTION AS NEEDED
Status: DISCONTINUED | OUTPATIENT
Start: 2020-01-01 | End: 2020-01-01 | Stop reason: HOSPADM

## 2020-01-01 RX ORDER — NALOXONE HCL 0.4 MG/ML
0.2 VIAL (ML) INJECTION AS NEEDED
Status: DISCONTINUED | OUTPATIENT
Start: 2020-01-01 | End: 2020-01-01

## 2020-01-01 RX ORDER — HYDROCODONE BITARTRATE AND ACETAMINOPHEN 5; 325 MG/1; MG/1
1 TABLET ORAL EVERY 6 HOURS PRN
Qty: 120 TABLET | Refills: 0 | Status: SHIPPED | OUTPATIENT
Start: 2020-01-01

## 2020-01-01 RX ORDER — FLUMAZENIL 0.1 MG/ML
0.2 INJECTION INTRAVENOUS AS NEEDED
Status: DISCONTINUED | OUTPATIENT
Start: 2020-01-01 | End: 2020-01-01

## 2020-01-01 RX ORDER — CEFAZOLIN SODIUM 2 G/100ML
2 INJECTION, SOLUTION INTRAVENOUS EVERY 8 HOURS
Status: COMPLETED | OUTPATIENT
Start: 2020-01-01 | End: 2020-01-01

## 2020-01-01 RX ORDER — HYDROMORPHONE HYDROCHLORIDE 1 MG/ML
0.25 INJECTION, SOLUTION INTRAMUSCULAR; INTRAVENOUS; SUBCUTANEOUS
Status: DISCONTINUED | OUTPATIENT
Start: 2020-01-01 | End: 2020-01-01

## 2020-01-01 RX ORDER — SODIUM CHLORIDE 0.9 % (FLUSH) 0.9 %
3-10 SYRINGE (ML) INJECTION AS NEEDED
Status: DISCONTINUED | OUTPATIENT
Start: 2020-01-01 | End: 2020-01-01 | Stop reason: HOSPADM

## 2020-01-01 RX ORDER — SODIUM CHLORIDE, SODIUM LACTATE, POTASSIUM CHLORIDE, CALCIUM CHLORIDE 600; 310; 30; 20 MG/100ML; MG/100ML; MG/100ML; MG/100ML
9 INJECTION, SOLUTION INTRAVENOUS CONTINUOUS
Status: DISCONTINUED | OUTPATIENT
Start: 2020-01-01 | End: 2020-01-01

## 2020-01-01 RX ORDER — CEFAZOLIN SODIUM 2 G/100ML
2 INJECTION, SOLUTION INTRAVENOUS
Status: DISCONTINUED | OUTPATIENT
Start: 2020-01-01 | End: 2020-01-01 | Stop reason: HOSPADM

## 2020-01-01 RX ORDER — SODIUM CHLORIDE, SODIUM LACTATE, POTASSIUM CHLORIDE, CALCIUM CHLORIDE 600; 310; 30; 20 MG/100ML; MG/100ML; MG/100ML; MG/100ML
50 INJECTION, SOLUTION INTRAVENOUS CONTINUOUS
Status: DISCONTINUED | OUTPATIENT
Start: 2020-01-01 | End: 2020-01-01

## 2020-01-01 RX ORDER — MORPHINE SULFATE 2 MG/ML
2 INJECTION, SOLUTION INTRAMUSCULAR; INTRAVENOUS ONCE
Status: COMPLETED | OUTPATIENT
Start: 2020-01-01 | End: 2020-01-01

## 2020-01-01 RX ORDER — CLONIDINE 0.3 MG/24H
1 PATCH, EXTENDED RELEASE TRANSDERMAL WEEKLY
Status: DISCONTINUED | OUTPATIENT
Start: 2020-01-01 | End: 2020-01-01 | Stop reason: HOSPADM

## 2020-01-01 RX ORDER — DEXTROSE MONOHYDRATE 25 G/50ML
25 INJECTION, SOLUTION INTRAVENOUS
Status: DISCONTINUED | OUTPATIENT
Start: 2020-01-01 | End: 2020-01-01 | Stop reason: HOSPADM

## 2020-01-01 RX ORDER — MORPHINE SULFATE 2 MG/ML
4 INJECTION, SOLUTION INTRAMUSCULAR; INTRAVENOUS EVERY 4 HOURS PRN
Status: DISCONTINUED | OUTPATIENT
Start: 2020-01-01 | End: 2020-01-01

## 2020-01-01 RX ORDER — RISPERIDONE 0.5 MG/1
1 TABLET ORAL EVERY 12 HOURS SCHEDULED
Status: DISCONTINUED | OUTPATIENT
Start: 2020-01-01 | End: 2020-01-01

## 2020-01-01 RX ORDER — CEFUROXIME AXETIL 500 MG/1
500 TABLET ORAL EVERY 12 HOURS SCHEDULED
Qty: 6 TABLET | Refills: 0 | Status: SHIPPED | OUTPATIENT
Start: 2020-01-01 | End: 2020-01-01

## 2020-01-01 RX ORDER — LIDOCAINE HYDROCHLORIDE 10 MG/ML
0.5 INJECTION, SOLUTION EPIDURAL; INFILTRATION; INTRACAUDAL; PERINEURAL ONCE AS NEEDED
Status: DISCONTINUED | OUTPATIENT
Start: 2020-01-01 | End: 2020-01-01 | Stop reason: HOSPADM

## 2020-01-01 RX ORDER — EPHEDRINE SULFATE 50 MG/ML
5 INJECTION, SOLUTION INTRAVENOUS ONCE AS NEEDED
Status: DISCONTINUED | OUTPATIENT
Start: 2020-01-01 | End: 2020-01-01

## 2020-01-01 RX ORDER — HYDRALAZINE HYDROCHLORIDE 20 MG/ML
5 INJECTION INTRAMUSCULAR; INTRAVENOUS
Status: DISCONTINUED | OUTPATIENT
Start: 2020-01-01 | End: 2020-01-01

## 2020-01-01 RX ORDER — FENTANYL CITRATE 50 UG/ML
50 INJECTION, SOLUTION INTRAMUSCULAR; INTRAVENOUS
Status: DISCONTINUED | OUTPATIENT
Start: 2020-01-01 | End: 2020-01-01 | Stop reason: HOSPADM

## 2020-01-01 RX ORDER — CEFUROXIME AXETIL 250 MG/1
500 TABLET ORAL EVERY 12 HOURS SCHEDULED
Status: DISCONTINUED | OUTPATIENT
Start: 2020-01-01 | End: 2020-01-01 | Stop reason: HOSPADM

## 2020-01-01 RX ORDER — PROMETHAZINE HYDROCHLORIDE 25 MG/ML
6.25 INJECTION, SOLUTION INTRAMUSCULAR; INTRAVENOUS
Status: DISCONTINUED | OUTPATIENT
Start: 2020-01-01 | End: 2020-01-01

## 2020-01-01 RX ORDER — SODIUM CHLORIDE 0.9 % (FLUSH) 0.9 %
3 SYRINGE (ML) INJECTION EVERY 12 HOURS SCHEDULED
Status: DISCONTINUED | OUTPATIENT
Start: 2020-01-01 | End: 2020-01-01 | Stop reason: HOSPADM

## 2020-01-01 RX ORDER — HYDROCODONE BITARTRATE AND ACETAMINOPHEN 5; 325 MG/1; MG/1
1 TABLET ORAL EVERY 6 HOURS PRN
Status: DISCONTINUED | OUTPATIENT
Start: 2020-01-01 | End: 2020-01-01 | Stop reason: HOSPADM

## 2020-01-01 RX ORDER — ONDANSETRON 2 MG/ML
4 INJECTION INTRAMUSCULAR; INTRAVENOUS ONCE AS NEEDED
Status: DISCONTINUED | OUTPATIENT
Start: 2020-01-01 | End: 2020-01-01

## 2020-01-01 RX ORDER — HYDRALAZINE HYDROCHLORIDE 50 MG/1
100 TABLET, FILM COATED ORAL EVERY 8 HOURS SCHEDULED
Status: DISCONTINUED | OUTPATIENT
Start: 2020-01-01 | End: 2020-01-01 | Stop reason: HOSPADM

## 2020-01-01 RX ORDER — IPRATROPIUM BROMIDE AND ALBUTEROL SULFATE 2.5; .5 MG/3ML; MG/3ML
3 SOLUTION RESPIRATORY (INHALATION)
Status: DISCONTINUED | OUTPATIENT
Start: 2020-01-01 | End: 2020-01-01 | Stop reason: HOSPADM

## 2020-01-01 RX ORDER — MIDAZOLAM HYDROCHLORIDE 1 MG/ML
1 INJECTION INTRAMUSCULAR; INTRAVENOUS
Status: DISCONTINUED | OUTPATIENT
Start: 2020-01-01 | End: 2020-01-01 | Stop reason: HOSPADM

## 2020-01-01 RX ORDER — ACETAMINOPHEN 325 MG/1
650 TABLET ORAL EVERY 4 HOURS PRN
Status: DISCONTINUED | OUTPATIENT
Start: 2020-01-01 | End: 2020-01-01 | Stop reason: HOSPADM

## 2020-01-01 RX ORDER — FUROSEMIDE 40 MG/1
40 TABLET ORAL DAILY
Qty: 60 TABLET | Refills: 3 | Status: SHIPPED | OUTPATIENT
Start: 2020-01-01

## 2020-01-01 RX ORDER — PROMETHAZINE HYDROCHLORIDE 25 MG/1
25 TABLET ORAL ONCE AS NEEDED
Status: DISCONTINUED | OUTPATIENT
Start: 2020-01-01 | End: 2020-01-01

## 2020-01-01 RX ORDER — PROPOFOL 10 MG/ML
VIAL (ML) INTRAVENOUS AS NEEDED
Status: DISCONTINUED | OUTPATIENT
Start: 2020-01-01 | End: 2020-01-01 | Stop reason: SURG

## 2020-01-01 RX ORDER — FENTANYL CITRATE 50 UG/ML
25 INJECTION, SOLUTION INTRAMUSCULAR; INTRAVENOUS
Status: DISCONTINUED | OUTPATIENT
Start: 2020-01-01 | End: 2020-01-01

## 2020-01-01 RX ORDER — LIDOCAINE HYDROCHLORIDE 20 MG/ML
INJECTION, SOLUTION INFILTRATION; PERINEURAL AS NEEDED
Status: DISCONTINUED | OUTPATIENT
Start: 2020-01-01 | End: 2020-01-01 | Stop reason: SURG

## 2020-01-01 RX ORDER — POTASSIUM CHLORIDE 20 MEQ/1
20 TABLET, EXTENDED RELEASE ORAL DAILY
Qty: 30 TABLET | Refills: 3 | Status: SHIPPED | OUTPATIENT
Start: 2020-01-01

## 2020-01-01 RX ORDER — FUROSEMIDE 40 MG/1
40 TABLET ORAL 2 TIMES DAILY
Status: DISCONTINUED | OUTPATIENT
Start: 2020-01-01 | End: 2020-01-01

## 2020-01-01 RX ORDER — BUPIVACAINE HYDROCHLORIDE AND EPINEPHRINE 5; 5 MG/ML; UG/ML
INJECTION, SOLUTION PERINEURAL AS NEEDED
Status: DISCONTINUED | OUTPATIENT
Start: 2020-01-01 | End: 2020-01-01 | Stop reason: HOSPADM

## 2020-01-01 RX ORDER — ONDANSETRON 4 MG/1
4 TABLET, FILM COATED ORAL EVERY 6 HOURS PRN
Status: DISCONTINUED | OUTPATIENT
Start: 2020-01-01 | End: 2020-01-01 | Stop reason: HOSPADM

## 2020-01-01 RX ORDER — ROCURONIUM BROMIDE 10 MG/ML
INJECTION, SOLUTION INTRAVENOUS AS NEEDED
Status: DISCONTINUED | OUTPATIENT
Start: 2020-01-01 | End: 2020-01-01 | Stop reason: SURG

## 2020-01-01 RX ORDER — DIPHENHYDRAMINE HCL 25 MG
25 CAPSULE ORAL
Status: DISCONTINUED | OUTPATIENT
Start: 2020-01-01 | End: 2020-01-01

## 2020-01-01 RX ORDER — FAMOTIDINE 10 MG/ML
20 INJECTION, SOLUTION INTRAVENOUS ONCE
Status: COMPLETED | OUTPATIENT
Start: 2020-01-01 | End: 2020-01-01

## 2020-01-01 RX ORDER — MIDAZOLAM HYDROCHLORIDE 1 MG/ML
2 INJECTION INTRAMUSCULAR; INTRAVENOUS
Status: DISCONTINUED | OUTPATIENT
Start: 2020-01-01 | End: 2020-01-01 | Stop reason: HOSPADM

## 2020-01-01 RX ORDER — ZIPRASIDONE MESYLATE 20 MG/ML
10 INJECTION, POWDER, LYOPHILIZED, FOR SOLUTION INTRAMUSCULAR ONCE
Status: COMPLETED | OUTPATIENT
Start: 2020-01-01 | End: 2020-01-01

## 2020-01-01 RX ORDER — CARVEDILOL 12.5 MG/1
12.5 TABLET ORAL EVERY 12 HOURS SCHEDULED
Status: DISCONTINUED | OUTPATIENT
Start: 2020-01-01 | End: 2020-01-01 | Stop reason: HOSPADM

## 2020-01-01 RX ORDER — NICOTINE POLACRILEX 4 MG
15 LOZENGE BUCCAL
Status: DISCONTINUED | OUTPATIENT
Start: 2020-01-01 | End: 2020-01-01 | Stop reason: HOSPADM

## 2020-01-01 RX ORDER — FENTANYL CITRATE 50 UG/ML
INJECTION, SOLUTION INTRAMUSCULAR; INTRAVENOUS AS NEEDED
Status: DISCONTINUED | OUTPATIENT
Start: 2020-01-01 | End: 2020-01-01 | Stop reason: SURG

## 2020-01-01 RX ORDER — CEFAZOLIN SODIUM 2 G/100ML
2 INJECTION, SOLUTION INTRAVENOUS
Status: COMPLETED | OUTPATIENT
Start: 2020-01-01 | End: 2020-01-01

## 2020-01-01 RX ORDER — INSULIN GLARGINE 100 [IU]/ML
10 INJECTION, SOLUTION SUBCUTANEOUS NIGHTLY
Status: DISCONTINUED | OUTPATIENT
Start: 2020-01-01 | End: 2020-01-01 | Stop reason: HOSPADM

## 2020-01-01 RX ORDER — ONDANSETRON 2 MG/ML
INJECTION INTRAMUSCULAR; INTRAVENOUS AS NEEDED
Status: DISCONTINUED | OUTPATIENT
Start: 2020-01-01 | End: 2020-01-01 | Stop reason: SURG

## 2020-01-01 RX ORDER — HYDRALAZINE HYDROCHLORIDE 20 MG/ML
10 INJECTION INTRAMUSCULAR; INTRAVENOUS EVERY 6 HOURS PRN
Status: DISCONTINUED | OUTPATIENT
Start: 2020-01-01 | End: 2020-01-01 | Stop reason: HOSPADM

## 2020-01-01 RX ORDER — NEOSTIGMINE METHYLSULFATE 0.5 MG/ML
INJECTION, SOLUTION INTRAVENOUS AS NEEDED
Status: DISCONTINUED | OUTPATIENT
Start: 2020-01-01 | End: 2020-01-01 | Stop reason: SURG

## 2020-01-01 RX ORDER — ATORVASTATIN CALCIUM 20 MG/1
40 TABLET, FILM COATED ORAL DAILY
Status: DISCONTINUED | OUTPATIENT
Start: 2020-01-01 | End: 2020-01-01 | Stop reason: HOSPADM

## 2020-01-01 RX ORDER — SODIUM CHLORIDE, SODIUM LACTATE, POTASSIUM CHLORIDE, CALCIUM CHLORIDE 600; 310; 30; 20 MG/100ML; MG/100ML; MG/100ML; MG/100ML
100 INJECTION, SOLUTION INTRAVENOUS CONTINUOUS
Status: DISCONTINUED | OUTPATIENT
Start: 2020-01-01 | End: 2020-01-01

## 2020-01-01 RX ORDER — ONDANSETRON 2 MG/ML
4 INJECTION INTRAMUSCULAR; INTRAVENOUS ONCE
Status: COMPLETED | OUTPATIENT
Start: 2020-01-01 | End: 2020-01-01

## 2020-01-01 RX ORDER — PROMETHAZINE HYDROCHLORIDE 25 MG/ML
12.5 INJECTION, SOLUTION INTRAMUSCULAR; INTRAVENOUS ONCE AS NEEDED
Status: DISCONTINUED | OUTPATIENT
Start: 2020-01-01 | End: 2020-01-01

## 2020-01-01 RX ORDER — PROMETHAZINE HYDROCHLORIDE 25 MG/1
25 SUPPOSITORY RECTAL ONCE AS NEEDED
Status: DISCONTINUED | OUTPATIENT
Start: 2020-01-01 | End: 2020-01-01

## 2020-01-01 RX ORDER — ISOSORBIDE MONONITRATE 30 MG/1
30 TABLET, EXTENDED RELEASE ORAL EVERY 12 HOURS
Status: DISCONTINUED | OUTPATIENT
Start: 2020-01-01 | End: 2020-01-01 | Stop reason: HOSPADM

## 2020-01-01 RX ORDER — BISACODYL 10 MG
10 SUPPOSITORY, RECTAL RECTAL DAILY
Status: DISCONTINUED | OUTPATIENT
Start: 2020-01-01 | End: 2020-01-01 | Stop reason: HOSPADM

## 2020-01-01 RX ORDER — MIRTAZAPINE 15 MG/1
15 TABLET, FILM COATED ORAL NIGHTLY
Status: DISCONTINUED | OUTPATIENT
Start: 2020-01-01 | End: 2020-01-01

## 2020-01-01 RX ORDER — OXYCODONE AND ACETAMINOPHEN 7.5; 325 MG/1; MG/1
1 TABLET ORAL ONCE AS NEEDED
Status: DISCONTINUED | OUTPATIENT
Start: 2020-01-01 | End: 2020-01-01

## 2020-01-01 RX ORDER — ONDANSETRON 2 MG/ML
4 INJECTION INTRAMUSCULAR; INTRAVENOUS EVERY 6 HOURS PRN
Status: DISCONTINUED | OUTPATIENT
Start: 2020-01-01 | End: 2020-01-01 | Stop reason: HOSPADM

## 2020-01-01 RX ORDER — DIPHENHYDRAMINE HYDROCHLORIDE 50 MG/ML
12.5 INJECTION INTRAMUSCULAR; INTRAVENOUS
Status: DISCONTINUED | OUTPATIENT
Start: 2020-01-01 | End: 2020-01-01

## 2020-01-01 RX ORDER — MAGNESIUM HYDROXIDE 1200 MG/15ML
LIQUID ORAL AS NEEDED
Status: DISCONTINUED | OUTPATIENT
Start: 2020-01-01 | End: 2020-01-01 | Stop reason: HOSPADM

## 2020-01-01 RX ORDER — CEFTRIAXONE SODIUM 1 G/50ML
1 INJECTION, SOLUTION INTRAVENOUS EVERY 24 HOURS
Status: DISCONTINUED | OUTPATIENT
Start: 2020-01-01 | End: 2020-01-01

## 2020-01-01 RX ADMIN — INSULIN LISPRO 2 UNITS: 100 INJECTION, SOLUTION INTRAVENOUS; SUBCUTANEOUS at 17:48

## 2020-01-01 RX ADMIN — MORPHINE SULFATE 2 MG: 2 INJECTION, SOLUTION INTRAMUSCULAR; INTRAVENOUS at 14:11

## 2020-01-01 RX ADMIN — INSULIN LISPRO 3 UNITS: 100 INJECTION, SOLUTION INTRAVENOUS; SUBCUTANEOUS at 18:37

## 2020-01-01 RX ADMIN — RISPERIDONE 1 MG: 0.5 TABLET, FILM COATED ORAL at 22:34

## 2020-01-01 RX ADMIN — RISPERIDONE 1 MG: 0.5 TABLET, FILM COATED ORAL at 21:38

## 2020-01-01 RX ADMIN — ISOSORBIDE MONONITRATE 30 MG: 30 TABLET ORAL at 11:23

## 2020-01-01 RX ADMIN — IPRATROPIUM BROMIDE AND ALBUTEROL SULFATE 3 ML: 2.5; .5 SOLUTION RESPIRATORY (INHALATION) at 10:32

## 2020-01-01 RX ADMIN — HYDRALAZINE HYDROCHLORIDE 100 MG: 50 TABLET, FILM COATED ORAL at 06:06

## 2020-01-01 RX ADMIN — IPRATROPIUM BROMIDE AND ALBUTEROL SULFATE 3 ML: 2.5; .5 SOLUTION RESPIRATORY (INHALATION) at 21:42

## 2020-01-01 RX ADMIN — CEFAZOLIN SODIUM 2 G: 2 INJECTION, SOLUTION INTRAVENOUS at 22:17

## 2020-01-01 RX ADMIN — CEFTRIAXONE SODIUM 1 G: 1 INJECTION, SOLUTION INTRAVENOUS at 23:05

## 2020-01-01 RX ADMIN — APIXABAN 5 MG: 5 TABLET, FILM COATED ORAL at 23:00

## 2020-01-01 RX ADMIN — HYDROCODONE BITARTRATE AND ACETAMINOPHEN 1 TABLET: 5; 325 TABLET ORAL at 13:12

## 2020-01-01 RX ADMIN — FAMOTIDINE 20 MG: 10 INJECTION, SOLUTION INTRAVENOUS at 07:06

## 2020-01-01 RX ADMIN — ROCURONIUM BROMIDE 25 MG: 10 INJECTION, SOLUTION INTRAVENOUS at 07:32

## 2020-01-01 RX ADMIN — HYDRALAZINE HYDROCHLORIDE 100 MG: 50 TABLET, FILM COATED ORAL at 06:37

## 2020-01-01 RX ADMIN — HYDRALAZINE HYDROCHLORIDE 100 MG: 50 TABLET, FILM COATED ORAL at 06:55

## 2020-01-01 RX ADMIN — HYDRALAZINE HYDROCHLORIDE 100 MG: 50 TABLET, FILM COATED ORAL at 22:34

## 2020-01-01 RX ADMIN — APIXABAN 5 MG: 5 TABLET, FILM COATED ORAL at 08:56

## 2020-01-01 RX ADMIN — APIXABAN 5 MG: 5 TABLET, FILM COATED ORAL at 08:43

## 2020-01-01 RX ADMIN — HYDROCODONE BITARTRATE AND ACETAMINOPHEN 1 TABLET: 5; 325 TABLET ORAL at 11:20

## 2020-01-01 RX ADMIN — HYDRALAZINE HYDROCHLORIDE 100 MG: 50 TABLET, FILM COATED ORAL at 23:00

## 2020-01-01 RX ADMIN — IPRATROPIUM BROMIDE AND ALBUTEROL SULFATE 3 ML: 2.5; .5 SOLUTION RESPIRATORY (INHALATION) at 12:15

## 2020-01-01 RX ADMIN — PHENYLEPHRINE HYDROCHLORIDE 200 MCG: 10 INJECTION INTRAVENOUS at 07:48

## 2020-01-01 RX ADMIN — CARVEDILOL 12.5 MG: 12.5 TABLET, FILM COATED ORAL at 21:39

## 2020-01-01 RX ADMIN — IPRATROPIUM BROMIDE AND ALBUTEROL SULFATE 3 ML: 2.5; .5 SOLUTION RESPIRATORY (INHALATION) at 15:33

## 2020-01-01 RX ADMIN — CEFTRIAXONE SODIUM 1 G: 1 INJECTION, SOLUTION INTRAVENOUS at 21:38

## 2020-01-01 RX ADMIN — INSULIN LISPRO 2 UNITS: 100 INJECTION, SOLUTION INTRAVENOUS; SUBCUTANEOUS at 08:39

## 2020-01-01 RX ADMIN — HYDRALAZINE HYDROCHLORIDE 10 MG: 20 INJECTION INTRAMUSCULAR; INTRAVENOUS at 17:20

## 2020-01-01 RX ADMIN — ISOSORBIDE MONONITRATE 30 MG: 30 TABLET ORAL at 17:08

## 2020-01-01 RX ADMIN — ISOSORBIDE MONONITRATE 30 MG: 30 TABLET ORAL at 00:39

## 2020-01-01 RX ADMIN — HYDRALAZINE HYDROCHLORIDE 100 MG: 50 TABLET, FILM COATED ORAL at 22:02

## 2020-01-01 RX ADMIN — IPRATROPIUM BROMIDE AND ALBUTEROL SULFATE 3 ML: 2.5; .5 SOLUTION RESPIRATORY (INHALATION) at 23:24

## 2020-01-01 RX ADMIN — HYDRALAZINE HYDROCHLORIDE 100 MG: 50 TABLET, FILM COATED ORAL at 13:59

## 2020-01-01 RX ADMIN — ZIPRASIDONE MESYLATE 10 MG: 20 INJECTION, POWDER, LYOPHILIZED, FOR SOLUTION INTRAMUSCULAR at 15:42

## 2020-01-01 RX ADMIN — FAMOTIDINE 20 MG: 10 INJECTION, SOLUTION INTRAVENOUS at 12:42

## 2020-01-01 RX ADMIN — IPRATROPIUM BROMIDE AND ALBUTEROL SULFATE 3 ML: 2.5; .5 SOLUTION RESPIRATORY (INHALATION) at 07:04

## 2020-01-01 RX ADMIN — INSULIN GLARGINE 10 UNITS: 100 INJECTION, SOLUTION SUBCUTANEOUS at 22:33

## 2020-01-01 RX ADMIN — INSULIN LISPRO 4 UNITS: 100 INJECTION, SOLUTION INTRAVENOUS; SUBCUTANEOUS at 17:27

## 2020-01-01 RX ADMIN — RISPERIDONE 1 MG: 0.5 TABLET, FILM COATED ORAL at 22:00

## 2020-01-01 RX ADMIN — ONDANSETRON HYDROCHLORIDE 4 MG: 2 SOLUTION INTRAMUSCULAR; INTRAVENOUS at 08:11

## 2020-01-01 RX ADMIN — MORPHINE SULFATE 4 MG: 2 INJECTION, SOLUTION INTRAMUSCULAR; INTRAVENOUS at 17:22

## 2020-01-01 RX ADMIN — SODIUM CHLORIDE, POTASSIUM CHLORIDE, SODIUM LACTATE AND CALCIUM CHLORIDE 9 ML/HR: 600; 310; 30; 20 INJECTION, SOLUTION INTRAVENOUS at 12:41

## 2020-01-01 RX ADMIN — CARVEDILOL 12.5 MG: 12.5 TABLET, FILM COATED ORAL at 09:01

## 2020-01-01 RX ADMIN — CEFUROXIME AXETIL 500 MG: 250 TABLET ORAL at 22:34

## 2020-01-01 RX ADMIN — FUROSEMIDE 40 MG: 40 TABLET ORAL at 22:58

## 2020-01-01 RX ADMIN — ATORVASTATIN CALCIUM 40 MG: 20 TABLET, FILM COATED ORAL at 09:27

## 2020-01-01 RX ADMIN — CLONIDINE 1 PATCH: 0.3 PATCH TRANSDERMAL at 14:52

## 2020-01-01 RX ADMIN — CEFUROXIME AXETIL 500 MG: 250 TABLET ORAL at 21:54

## 2020-01-01 RX ADMIN — ATORVASTATIN CALCIUM 20 MG: 20 TABLET, FILM COATED ORAL at 09:01

## 2020-01-01 RX ADMIN — APIXABAN 5 MG: 5 TABLET, FILM COATED ORAL at 09:18

## 2020-01-01 RX ADMIN — HYDROCODONE BITARTRATE AND ACETAMINOPHEN 1 TABLET: 5; 325 TABLET ORAL at 08:10

## 2020-01-01 RX ADMIN — APIXABAN 5 MG: 5 TABLET, FILM COATED ORAL at 22:34

## 2020-01-01 RX ADMIN — IPRATROPIUM BROMIDE AND ALBUTEROL SULFATE 3 ML: 2.5; .5 SOLUTION RESPIRATORY (INHALATION) at 07:20

## 2020-01-01 RX ADMIN — SODIUM CHLORIDE, POTASSIUM CHLORIDE, SODIUM LACTATE AND CALCIUM CHLORIDE 100 ML/HR: 600; 310; 30; 20 INJECTION, SOLUTION INTRAVENOUS at 15:09

## 2020-01-01 RX ADMIN — RISPERIDONE 1 MG: 0.5 TABLET, FILM COATED ORAL at 09:27

## 2020-01-01 RX ADMIN — ISOSORBIDE MONONITRATE 30 MG: 30 TABLET ORAL at 12:06

## 2020-01-01 RX ADMIN — IPRATROPIUM BROMIDE AND ALBUTEROL SULFATE 3 ML: 2.5; .5 SOLUTION RESPIRATORY (INHALATION) at 11:15

## 2020-01-01 RX ADMIN — IPRATROPIUM BROMIDE AND ALBUTEROL SULFATE 3 ML: 2.5; .5 SOLUTION RESPIRATORY (INHALATION) at 07:07

## 2020-01-01 RX ADMIN — FENTANYL CITRATE 50 MCG: 50 INJECTION INTRAMUSCULAR; INTRAVENOUS at 08:03

## 2020-01-01 RX ADMIN — PHENYLEPHRINE HYDROCHLORIDE 200 MCG: 10 INJECTION INTRAVENOUS at 07:36

## 2020-01-01 RX ADMIN — HYDRALAZINE HYDROCHLORIDE 100 MG: 50 TABLET, FILM COATED ORAL at 00:39

## 2020-01-01 RX ADMIN — INSULIN GLARGINE 10 UNITS: 100 INJECTION, SOLUTION SUBCUTANEOUS at 20:06

## 2020-01-01 RX ADMIN — NITROGLYCERIN 1 INCH: 20 OINTMENT TOPICAL at 06:15

## 2020-01-01 RX ADMIN — CLONIDINE 1 PATCH: 0.3 PATCH TRANSDERMAL at 09:18

## 2020-01-01 RX ADMIN — PHENYLEPHRINE HYDROCHLORIDE 200 MCG: 10 INJECTION INTRAVENOUS at 07:38

## 2020-01-01 RX ADMIN — INSULIN LISPRO 3 UNITS: 100 INJECTION, SOLUTION INTRAVENOUS; SUBCUTANEOUS at 12:18

## 2020-01-01 RX ADMIN — APIXABAN 5 MG: 5 TABLET, FILM COATED ORAL at 08:10

## 2020-01-01 RX ADMIN — ISOSORBIDE MONONITRATE 30 MG: 30 TABLET ORAL at 23:18

## 2020-01-01 RX ADMIN — CARVEDILOL 12.5 MG: 12.5 TABLET, FILM COATED ORAL at 08:21

## 2020-01-01 RX ADMIN — CARVEDILOL 12.5 MG: 12.5 TABLET, FILM COATED ORAL at 21:45

## 2020-01-01 RX ADMIN — SODIUM CHLORIDE 2190 ML: 9 INJECTION, SOLUTION INTRAVENOUS at 19:19

## 2020-01-01 RX ADMIN — BISACODYL 10 MG: 10 SUPPOSITORY RECTAL at 09:20

## 2020-01-01 RX ADMIN — IPRATROPIUM BROMIDE AND ALBUTEROL SULFATE 3 ML: 2.5; .5 SOLUTION RESPIRATORY (INHALATION) at 19:53

## 2020-01-01 RX ADMIN — INSULIN GLARGINE 10 UNITS: 100 INJECTION, SOLUTION SUBCUTANEOUS at 22:00

## 2020-01-01 RX ADMIN — PHENYLEPHRINE HYDROCHLORIDE 200 MCG: 10 INJECTION INTRAVENOUS at 07:41

## 2020-01-01 RX ADMIN — HYDRALAZINE HYDROCHLORIDE 100 MG: 50 TABLET, FILM COATED ORAL at 05:01

## 2020-01-01 RX ADMIN — ACETAMINOPHEN 650 MG: 325 TABLET, FILM COATED ORAL at 10:43

## 2020-01-01 RX ADMIN — CARVEDILOL 12.5 MG: 12.5 TABLET, FILM COATED ORAL at 09:18

## 2020-01-01 RX ADMIN — CARVEDILOL 12.5 MG: 12.5 TABLET, FILM COATED ORAL at 08:10

## 2020-01-01 RX ADMIN — CARVEDILOL 12.5 MG: 12.5 TABLET, FILM COATED ORAL at 20:06

## 2020-01-01 RX ADMIN — CEFUROXIME AXETIL 500 MG: 250 TABLET ORAL at 09:18

## 2020-01-01 RX ADMIN — INSULIN LISPRO 3 UNITS: 100 INJECTION, SOLUTION INTRAVENOUS; SUBCUTANEOUS at 08:53

## 2020-01-01 RX ADMIN — ATORVASTATIN CALCIUM 40 MG: 20 TABLET, FILM COATED ORAL at 08:47

## 2020-01-01 RX ADMIN — ISOSORBIDE MONONITRATE 30 MG: 30 TABLET ORAL at 22:13

## 2020-01-01 RX ADMIN — CARVEDILOL 12.5 MG: 12.5 TABLET, FILM COATED ORAL at 22:58

## 2020-01-01 RX ADMIN — HYDRALAZINE HYDROCHLORIDE 100 MG: 50 TABLET, FILM COATED ORAL at 16:59

## 2020-01-01 RX ADMIN — IPRATROPIUM BROMIDE AND ALBUTEROL SULFATE 3 ML: 2.5; .5 SOLUTION RESPIRATORY (INHALATION) at 11:17

## 2020-01-01 RX ADMIN — HYDRALAZINE HYDROCHLORIDE 100 MG: 50 TABLET, FILM COATED ORAL at 21:46

## 2020-01-01 RX ADMIN — SODIUM CHLORIDE, POTASSIUM CHLORIDE, SODIUM LACTATE AND CALCIUM CHLORIDE 100 ML/HR: 600; 310; 30; 20 INJECTION, SOLUTION INTRAVENOUS at 01:57

## 2020-01-01 RX ADMIN — LIDOCAINE HYDROCHLORIDE 60 MG: 20 INJECTION, SOLUTION INFILTRATION; PERINEURAL at 07:32

## 2020-01-01 RX ADMIN — CARVEDILOL 12.5 MG: 12.5 TABLET, FILM COATED ORAL at 23:00

## 2020-01-01 RX ADMIN — HYDRALAZINE HYDROCHLORIDE 100 MG: 50 TABLET, FILM COATED ORAL at 09:18

## 2020-01-01 RX ADMIN — CARVEDILOL 12.5 MG: 12.5 TABLET, FILM COATED ORAL at 08:56

## 2020-01-01 RX ADMIN — APIXABAN 5 MG: 5 TABLET, FILM COATED ORAL at 09:01

## 2020-01-01 RX ADMIN — APIXABAN 5 MG: 5 TABLET, FILM COATED ORAL at 21:54

## 2020-01-01 RX ADMIN — MORPHINE SULFATE 4 MG: 2 INJECTION, SOLUTION INTRAMUSCULAR; INTRAVENOUS at 21:45

## 2020-01-01 RX ADMIN — CARVEDILOL 12.5 MG: 12.5 TABLET, FILM COATED ORAL at 08:44

## 2020-01-01 RX ADMIN — ISOSORBIDE MONONITRATE 30 MG: 30 TABLET ORAL at 22:35

## 2020-01-01 RX ADMIN — APIXABAN 5 MG: 5 TABLET, FILM COATED ORAL at 20:06

## 2020-01-01 RX ADMIN — CEFAZOLIN SODIUM 2 G: 2 INJECTION, SOLUTION INTRAVENOUS at 15:09

## 2020-01-01 RX ADMIN — INSULIN LISPRO 4 UNITS: 100 INJECTION, SOLUTION INTRAVENOUS; SUBCUTANEOUS at 11:23

## 2020-01-01 RX ADMIN — HYDRALAZINE HYDROCHLORIDE 100 MG: 50 TABLET, FILM COATED ORAL at 21:45

## 2020-01-01 RX ADMIN — INSULIN LISPRO 3 UNITS: 100 INJECTION, SOLUTION INTRAVENOUS; SUBCUTANEOUS at 12:06

## 2020-01-01 RX ADMIN — PHENYLEPHRINE HYDROCHLORIDE 200 MCG: 10 INJECTION INTRAVENOUS at 07:52

## 2020-01-01 RX ADMIN — INSULIN LISPRO 3 UNITS: 100 INJECTION, SOLUTION INTRAVENOUS; SUBCUTANEOUS at 08:10

## 2020-01-01 RX ADMIN — IPRATROPIUM BROMIDE AND ALBUTEROL SULFATE 3 ML: 2.5; .5 SOLUTION RESPIRATORY (INHALATION) at 14:04

## 2020-01-01 RX ADMIN — GLYCOPYRROLATE 0.6 MG: 0.2 INJECTION INTRAMUSCULAR; INTRAVENOUS at 08:11

## 2020-01-01 RX ADMIN — INSULIN LISPRO 2 UNITS: 100 INJECTION, SOLUTION INTRAVENOUS; SUBCUTANEOUS at 12:35

## 2020-01-01 RX ADMIN — IPRATROPIUM BROMIDE AND ALBUTEROL SULFATE 3 ML: 2.5; .5 SOLUTION RESPIRATORY (INHALATION) at 14:46

## 2020-01-01 RX ADMIN — HYDRALAZINE HYDROCHLORIDE 100 MG: 50 TABLET, FILM COATED ORAL at 16:39

## 2020-01-01 RX ADMIN — INSULIN GLARGINE 10 UNITS: 100 INJECTION, SOLUTION SUBCUTANEOUS at 21:43

## 2020-01-01 RX ADMIN — ISOSORBIDE MONONITRATE 30 MG: 30 TABLET ORAL at 00:19

## 2020-01-01 RX ADMIN — ISOSORBIDE MONONITRATE 30 MG: 30 TABLET ORAL at 22:02

## 2020-01-01 RX ADMIN — RISPERIDONE 1 MG: 0.5 TABLET, FILM COATED ORAL at 08:09

## 2020-01-01 RX ADMIN — MORPHINE SULFATE 4 MG: 2 INJECTION, SOLUTION INTRAMUSCULAR; INTRAVENOUS at 07:36

## 2020-01-01 RX ADMIN — IPRATROPIUM BROMIDE AND ALBUTEROL SULFATE 3 ML: 2.5; .5 SOLUTION RESPIRATORY (INHALATION) at 10:47

## 2020-01-01 RX ADMIN — INSULIN GLARGINE 10 UNITS: 100 INJECTION, SOLUTION SUBCUTANEOUS at 22:58

## 2020-01-01 RX ADMIN — RISPERIDONE 1 MG: 0.5 TABLET, FILM COATED ORAL at 08:44

## 2020-01-01 RX ADMIN — APIXABAN 5 MG: 5 TABLET, FILM COATED ORAL at 21:45

## 2020-01-01 RX ADMIN — FENTANYL CITRATE 25 MCG: 50 INJECTION INTRAMUSCULAR; INTRAVENOUS at 08:29

## 2020-01-01 RX ADMIN — HYDRALAZINE HYDROCHLORIDE 100 MG: 50 TABLET, FILM COATED ORAL at 06:30

## 2020-01-01 RX ADMIN — INSULIN LISPRO 4 UNITS: 100 INJECTION, SOLUTION INTRAVENOUS; SUBCUTANEOUS at 09:10

## 2020-01-01 RX ADMIN — IPRATROPIUM BROMIDE AND ALBUTEROL SULFATE 3 ML: 2.5; .5 SOLUTION RESPIRATORY (INHALATION) at 07:33

## 2020-01-01 RX ADMIN — IPRATROPIUM BROMIDE AND ALBUTEROL SULFATE 3 ML: 2.5; .5 SOLUTION RESPIRATORY (INHALATION) at 22:23

## 2020-01-01 RX ADMIN — ACETAMINOPHEN 650 MG: 325 TABLET, FILM COATED ORAL at 04:22

## 2020-01-01 RX ADMIN — MORPHINE SULFATE 4 MG: 2 INJECTION, SOLUTION INTRAMUSCULAR; INTRAVENOUS at 01:20

## 2020-01-01 RX ADMIN — IPRATROPIUM BROMIDE AND ALBUTEROL SULFATE 3 ML: 2.5; .5 SOLUTION RESPIRATORY (INHALATION) at 10:54

## 2020-01-01 RX ADMIN — ISOSORBIDE MONONITRATE 30 MG: 30 TABLET ORAL at 23:00

## 2020-01-01 RX ADMIN — RISPERIDONE 1 MG: 0.5 TABLET, FILM COATED ORAL at 08:58

## 2020-01-01 RX ADMIN — ATORVASTATIN CALCIUM 40 MG: 20 TABLET, FILM COATED ORAL at 08:10

## 2020-01-01 RX ADMIN — ISOSORBIDE MONONITRATE 30 MG: 30 TABLET ORAL at 12:18

## 2020-01-01 RX ADMIN — IPRATROPIUM BROMIDE AND ALBUTEROL SULFATE 3 ML: 2.5; .5 SOLUTION RESPIRATORY (INHALATION) at 19:43

## 2020-01-01 RX ADMIN — MIRTAZAPINE 15 MG: 15 TABLET, FILM COATED ORAL at 23:18

## 2020-01-01 RX ADMIN — PROPOFOL 50 MG: 10 INJECTION, EMULSION INTRAVENOUS at 07:32

## 2020-01-01 RX ADMIN — HYDRALAZINE HYDROCHLORIDE 100 MG: 50 TABLET, FILM COATED ORAL at 21:38

## 2020-01-01 RX ADMIN — CARVEDILOL 12.5 MG: 12.5 TABLET, FILM COATED ORAL at 09:27

## 2020-01-01 RX ADMIN — SODIUM CHLORIDE 1000 ML: 9 INJECTION, SOLUTION INTRAVENOUS at 21:00

## 2020-01-01 RX ADMIN — INSULIN LISPRO 5 UNITS: 100 INJECTION, SOLUTION INTRAVENOUS; SUBCUTANEOUS at 09:01

## 2020-01-01 RX ADMIN — MIRTAZAPINE 15 MG: 15 TABLET, FILM COATED ORAL at 22:34

## 2020-01-01 RX ADMIN — SODIUM CHLORIDE, POTASSIUM CHLORIDE, SODIUM LACTATE AND CALCIUM CHLORIDE 9 ML/HR: 600; 310; 30; 20 INJECTION, SOLUTION INTRAVENOUS at 07:06

## 2020-01-01 RX ADMIN — CARVEDILOL 12.5 MG: 12.5 TABLET, FILM COATED ORAL at 21:55

## 2020-01-01 RX ADMIN — FENTANYL CITRATE 25 MCG: 50 INJECTION INTRAMUSCULAR; INTRAVENOUS at 08:36

## 2020-01-01 RX ADMIN — ONDANSETRON 4 MG: 2 INJECTION INTRAMUSCULAR; INTRAVENOUS at 14:11

## 2020-01-01 RX ADMIN — NEOSTIGMINE METHYLSULFATE 3.5 MG: 0.5 INJECTION, SOLUTION INTRAVENOUS at 08:11

## 2020-01-01 RX ADMIN — TAZOBACTAM SODIUM AND PIPERACILLIN SODIUM 3.38 G: 375; 3 INJECTION, SOLUTION INTRAVENOUS at 21:21

## 2020-01-01 RX ADMIN — MORPHINE SULFATE 4 MG: 2 INJECTION, SOLUTION INTRAMUSCULAR; INTRAVENOUS at 00:08

## 2020-01-01 RX ADMIN — IPRATROPIUM BROMIDE AND ALBUTEROL SULFATE 3 ML: 2.5; .5 SOLUTION RESPIRATORY (INHALATION) at 07:11

## 2020-01-01 RX ADMIN — HYDRALAZINE HYDROCHLORIDE 100 MG: 50 TABLET, FILM COATED ORAL at 14:06

## 2020-01-01 RX ADMIN — ISOSORBIDE MONONITRATE 30 MG: 30 TABLET ORAL at 11:20

## 2020-01-01 RX ADMIN — IPRATROPIUM BROMIDE AND ALBUTEROL SULFATE 3 ML: 2.5; .5 SOLUTION RESPIRATORY (INHALATION) at 17:00

## 2020-01-01 RX ADMIN — INSULIN LISPRO 4 UNITS: 100 INJECTION, SOLUTION INTRAVENOUS; SUBCUTANEOUS at 12:07

## 2020-01-01 RX ADMIN — RISPERIDONE 1 MG: 0.5 TABLET, FILM COATED ORAL at 22:59

## 2020-01-01 RX ADMIN — APIXABAN 5 MG: 5 TABLET, FILM COATED ORAL at 09:27

## 2020-01-01 RX ADMIN — CEFUROXIME AXETIL 500 MG: 250 TABLET ORAL at 09:27

## 2020-01-01 RX ADMIN — CARVEDILOL 12.5 MG: 12.5 TABLET, FILM COATED ORAL at 22:34

## 2020-01-01 RX ADMIN — CEFAZOLIN SODIUM 2 G: 2 INJECTION, SOLUTION INTRAVENOUS at 06:34

## 2020-01-01 RX ADMIN — CEFAZOLIN SODIUM 2 G: 2 INJECTION, SOLUTION INTRAVENOUS at 07:36

## 2020-01-01 RX ADMIN — APIXABAN 5 MG: 5 TABLET, FILM COATED ORAL at 21:38

## 2020-04-23 PROBLEM — S72.141A CLOSED INTERTROCHANTERIC FRACTURE OF HIP, RIGHT, INITIAL ENCOUNTER (HCC): Status: ACTIVE | Noted: 2020-01-01

## 2020-04-24 NOTE — ANESTHESIA PREPROCEDURE EVALUATION
Anesthesia Evaluation     Patient summary reviewed and Nursing notes reviewed                Airway   Mallampati: III  Dental      Pulmonary    (+) a smoker Former, COPD, asthma,  Cardiovascular     Rhythm: irregular  Rate: normal    (+) hypertension, valvular problems/murmurs TI and MR, past MI , CAD, dysrhythmias Atrial Fib, angina, CHF , DVT, hyperlipidemia,       Neuro/Psych  (+) TIA, CVA, psychiatric history, dementia,     GI/Hepatic/Renal/Endo    (+) obesity,  GERD,  renal disease CRI, diabetes mellitus,     Musculoskeletal     Abdominal    Substance History - negative use     OB/GYN negative ob/gyn ROS         Other   arthritis,                      Anesthesia Plan    ASA 4     general     intravenous induction     Anesthetic plan, all risks, benefits, and alternatives have been provided, discussed and informed consent has been obtained with: patient.

## 2020-04-25 NOTE — ANESTHESIA PREPROCEDURE EVALUATION
Anesthesia Evaluation     Patient summary reviewed and Nursing notes reviewed   no history of anesthetic complications:  NPO Solid Status: > 8 hours  NPO Liquid Status: > 2 hours           Airway   Mallampati: II  TM distance: >3 FB  Neck ROM: full  Dental - normal exam     Pulmonary - normal exam   (+) a smoker Former, asthma,  Cardiovascular - normal exam  Exercise tolerance: poor (<4 METS)    ECG reviewed  Rhythm: regular    (+) hypertension, past MI , CAD, dysrhythmias Atrial Fib, angina, CHF , DVT, hyperlipidemia,     ROS comment: · Left ventricular systolic function is hyperdynamic (EF > 70).  · Hyperdynamic right ventricular systolic function noted.  · Left atrial cavity size is moderately dilated.  · Mild mitral valve regurgitation is present. Mild mitral valve stenosis is present.  · Mild tricuspid valve regurgitation is present.  · Calculated right ventricular systolic pressure from tricuspid regurgitation is 50 mmHg.  · There is no evidence of pericardial effusion.    - ABNORMAL ECG -  Atrial fibrillation  c/w prior ecg, a fib is now seen    Neuro/Psych  (+) TIA, CVA, psychiatric history Schizophrenia, dementia,       ROS Comment: Confused, minimally responsive to ?s  GI/Hepatic/Renal/Endo    (+)  GERD,  renal disease, diabetes mellitus,     Musculoskeletal     Abdominal  - normal exam    Bowel sounds: normal.   Substance History - negative use     OB/GYN negative ob/gyn ROS         Other   arthritis,                      Anesthesia Plan    ASA 4     general     intravenous induction     Anesthetic plan, all risks, benefits, and alternatives have been provided, discussed and informed consent has been obtained with: patient.    Plan discussed with CRNA and attending.

## 2020-04-25 NOTE — ANESTHESIA POSTPROCEDURE EVALUATION
"Patient: Britney Deras    Procedure Summary     Date:  04/25/20 Room / Location:  [facility] OR [address] [place] [address] MAIN OR    Anesthesia Start:  0725 Anesthesia Stop:  0838    Procedure:  RT. HIP NAIL (Right Thigh) Diagnosis:      Surgeon:  Buddy Quiñones II, MD Provider:  Jenny Gupta MD    Anesthesia Type:  general ASA Status:  4          Anesthesia Type: general    Vitals  Vitals Value Taken Time   /74 4/25/2020  9:30 AM   Temp 36.5 °C (97.7 °F) 4/25/2020  8:31 AM   Pulse 96 4/25/2020  9:41 AM   Resp 16 4/25/2020  9:00 AM   SpO2 100 % 4/25/2020  9:41 AM   Vitals shown include unvalidated device data.        Post Anesthesia Care and Evaluation    Patient location during evaluation: PACU  Patient participation: complete - patient participated  Level of consciousness: awake  Pain management: adequate  Airway patency: patent  Anesthetic complications: No anesthetic complications    Cardiovascular status: acceptable  Respiratory status: acceptable  Hydration status: acceptable    Comments: /79   Pulse 102   Temp 36.5 °C (97.7 °F) (Oral)   Resp 16   Ht 162.6 cm (64\")   Wt 82.1 kg (181 lb)   LMP  (LMP Unknown)   SpO2 100%   BMI 31.07 kg/m²         "

## 2020-04-25 NOTE — ANESTHESIA PROCEDURE NOTES
Airway  Urgency: elective    Date/Time: 4/25/2020 7:35 AM  Airway not difficult    General Information and Staff    Patient location during procedure: OR  CRNA: Hiral Oswald CRNA    Indications and Patient Condition  Indications for airway management: airway protection    Preoxygenated: yes  MILS maintained throughout  Mask difficulty assessment: 0 - not attempted    Final Airway Details  Final airway type: endotracheal airway      Successful airway: ETT  Cuffed: yes   Successful intubation technique: direct laryngoscopy and RSI  Endotracheal tube insertion site: oral  Blade: Lachelle  Blade size: 3  ETT size (mm): 7.0  Cormack-Lehane Classification: grade I - full view of glottis  Placement verified by: chest auscultation and capnometry   Cuff volume (mL): 8  Measured from: lips  ETT/EBT  to lips (cm): 20  Number of attempts at approach: 1  Assessment: lips, teeth, and gum same as pre-op and atraumatic intubation    Additional Comments  Atraumatic ET Tube placement.  Teeth as pre-op. BLEBS.  -ABD sounds.  +ET CO2.  Secured to face

## 2020-05-01 NOTE — DISCHARGE SUMMARY
PHYSICIAN DISCHARGE SUMMARY  [facility], [facility]    Patient Identification:    Name: Britney Deras  Age: 84 y.o.  Sex: female  :  1935  MRN: [id number]    Primary Care Physician: Gustavo Mcgrath MD    Admit date: 2020    Discharge date and time:2020    Discharged Condition: fair    Discharge Diagnoses:    Closed intertrochanteric fracture of hip, right, initial encounter (CMS/[facility])    Hypertension    Schizoaffective disorder (CMS/[facility])    Chronic renal insufficiency, stage III (moderate) (CMS/[facility])    Chronic diastolic (congestive) heart failure    Coronary artery disease    Paranoid schizophrenia (CMS/[facility])    Diabetes mellitus (CMS/[facility])        Hospital Course: Britney Deras  is a 84-year-old female, resident of nursing home.  Patient has history of coronary artery disease, CHF, chronic kidney disease, dementia, diabetes mellitus, hypertension, stroke.  Patient was tested for COVID 10 days ago this came back negative.  Patient has been doing okay, without any symptoms, she has been isolated at the nursing home since several other patients have been positive at the nursing home.  She has not had any fever, chills, cough, shortness of breath, change in mental status, diarrhea.  Unfortunately patient fell the day prior to admission causing a right hip fracture for which patient was sent to the emergency room.  In the ER, patient was found to have: Elevated blood pressure, creatinine 1.39, sodium 136, potassium 5.0, WBC 11.47, x-ray showed right intertrochanteric fracture, no pulmonary consolidation.  Patient has been admitted for further management.    Upon admission patient was placed on medications for pain and nausea, orthopedic was consulted.  Cardiology was consulted to clear her up for for surgery.  She was negative, twice prior to surgery.  Patient underwent surgery on , postoperatively she did okay, she developed altered mental status  with restlessness and some delusions, this was due to a UTI.  UTI was treated with Rocephin and will continue and complete treatment with cefuroxime at the nursing home.  At this time patient is much better, mental status back to baseline, somebody made patient were adjusted to the patient to her current condition, however, this will may need to be adjusted again at the nursing home according to how she does.  Her mental status is back to baseline, she has been working with physical therapy.  Patient is ready to be discharged back to nursing home.    PMHX:   Past Medical History:   Diagnosis Date   • Angina pectoris (CMS/[facility])    • Angina pectoris (CMS/[facility])    • Arthritis    • Asthma    • Atherosclerosis    • Cellulitis    • CHF (congestive heart failure) (CMS/[facility])    • Chronic kidney disease    • Coronary artery disease    • Dementia (CMS/[facility])    • Depression    • Diabetes mellitus (CMS/[facility])    • GERD (gastroesophageal reflux disease)    • History of transfusion    • Hyperlipidemia    • Hypertension    • Lymphedema    • NSTEMI (non-ST elevated myocardial infarction) (CMS/[facility])    • Stroke (CMS/[facility])    • TIA (transient ischemic attack)      PSHX:   Past Surgical History:   Procedure Laterality Date   • ABDOMINAL SURGERY     • CHOLECYSTECTOMY     • COLONOSCOPY     • EYE SURGERY     • FEMUR IM NAILING/RODDING Right 4/25/2020    Procedure: RT. HIP NAIL;  Surgeon: Buddy Quiñones II, MD;  Location: [facility];  Service: Orthopedics;  Laterality: Right;   • HYSTERECTOMY     • PERIPHERALLY INSERTED CENTRAL CATHETER INSERTION             Consults:     Consults     Date and Time Order Name Status Description    4/23/2020 2148 Inpatient Cardiology Consult Completed     4/23/2020 1502 Ortho (on-call MD unless specified) Completed     4/23/2020 1448 Family Medicine Consult Completed           Discharge Exam:    /77 (BP Location: Left arm, Patient Position: Lying)   Pulse 82   Temp 97.3 °F (36.3 °C) (Oral)    "Resp 16   Ht 162.6 cm (64.02\")   Wt 82.1 kg (181 lb)   LMP  (LMP Unknown)   SpO2 98%   BMI 31.05 kg/m²     General:  Awake, alert, oriented x2 this time.  Back to baseline.    Neck: Supple, symmetrical, trachea midline, no adenopathy;              thyroid:  no enlargement/tenderness/nodules;              no carotid bruit or JVD  Cardiovascular: Regular rate, regular rhythm and intact distal pulses.              Exam reveals no gallop and no friction rub. No murmur heard  Pulmonary: Clear to auscultation bilaterally, respirations unlabored.               No rhonchi, wheezing or rales.   Abdominal: Soft, nontender, bowel sounds active all four quadrants,     no masses, no hepatomegaly, no splenomegaly.   Extremities: Right hip incision covered with dressing, mild edema, expectable.  Tender to palpation as expected.  Neurovascularly intact distally.  Remainder of extremities are normal  Pulses: 2 + symmetric all extremities  Neurological: Alert, oriented x2 at this time.  No new focal sensorimotor deficit.  Skin: Skin color, texture, normal. Turgor is decreased. No rashes or lesions  Data Review:        Results from last 7 days   Lab Units 05/01/20  0619 04/30/20  0524 04/28/20  2147   WBC 10*3/mm3 7.69 8.23 8.35   HEMOGLOBIN g/dL 9.2* 9.5* 9.3*   HEMATOCRIT % 30.4* 31.1* 31.1*   PLATELETS 10*3/mm3 334 238 266       Results from last 7 days   Lab Units 05/01/20  0619 04/30/20  0524 04/29/20  0752   SODIUM mmol/L 138 136 135*   POTASSIUM mmol/L 4.2 4.4 5.2   CHLORIDE mmol/L 103 102 103   CO2 mmol/L 25.3 23.6 17.6*   BUN mg/dL 24* 19 26*   CREATININE mg/dL 0.74 0.73 0.99   CALCIUM mg/dL 8.6 8.3* 8.6   GLUCOSE mg/dL 146* 137* 261*           Lab Results   Lab Value Date/Time    TROPONINT 0.072 (H) 12/02/2018 0446    TROPONINT 0.129 (C) 12/01/2018 0907    TROPONINT 0.069 (H) 11/30/2018 2339    TROPONINT 0.022 01/10/2018 0630    TROPONINT 0.022 01/09/2018 1828    TROPONINT 0.021 04/20/2017 2134    TROPONINT 0.024 " 04/20/2017 1710    TROPONINT <0.010 03/24/2017 0517    TROPONINT 0.264 (C) 02/15/2017 0527    TROPONINT 0.424 (C) 02/14/2017 0415    TROPONINT 0.261 (C) 02/13/2017 1311       Microbiology Results (last 10 days)     Procedure Component Value - Date/Time    Urine Culture - Urine, Urine, Random Void [[id number]]  (Normal) Collected:  04/26/20 1801    Lab Status:  Final result Specimen:  Urine, Random Void Updated:  04/28/20 0157     Urine Culture No growth    SARS-CoV-2 PCR ([facility] IN-HOUSE PERFORMED), NP SWAB IN TRANSPORT MEDIA - Swab, Nasopharynx [[id number]]  (Normal) Collected:  04/24/20 1146    Lab Status:  Final result Specimen:  Swab from Nasopharynx Updated:  04/24/20 1245     COVID19 Not Detected           Imaging Results (All)     Procedure Component Value Units Date/Time    XR Hip With or Without Pelvis 2 - 3 View Left [[id number]] Collected:  04/25/20 0823     Updated:  04/25/20 0912    Narrative:       XR HIP W OR WO PELVIS 2-3 VIEW LEFT-, FL C ARM DURING SURGERY-     INDICATIONS: Right intertrochanteric fracture     TECHNIQUE: FLUOROSCOPIC ASSISTANCE IN THE OPERATING ROOM.     FINDINGS:     4 intraoperative fluoroscopic spot views were obtained and recorded the  PACS for review, revealing erwin and screw hardware fixation of right  intertrochanteric fracture. Please see operative report for full  details.     Fluoroscopy time: 25.2 seconds       Impression:          As described.     This report was finalized on 4/25/2020 8:23 AM by Dr. Jerzy Murphy M.D.       FL C Arm During Surgery [[id number]] Collected:  04/25/20 0823     Updated:  04/25/20 0912    Narrative:       XR HIP W OR WO PELVIS 2-3 VIEW LEFT-, FL C ARM DURING SURGERY-     INDICATIONS: Right intertrochanteric fracture     TECHNIQUE: FLUOROSCOPIC ASSISTANCE IN THE OPERATING ROOM.     FINDINGS:     4 intraoperative fluoroscopic spot views were obtained and recorded the  PACS for review, revealing erwin and screw hardware fixation of  right  intertrochanteric fracture. Please see operative report for full  details.     Fluoroscopy time: 25.2 seconds       Impression:          As described.     This report was finalized on 4/25/2020 8:23 AM by Dr. Jerzy Murphy M.D.       XR Hip With or Without Pelvis 2 - 3 View Right [[id number]] Collected:  04/25/20 0823     Updated:  04/25/20 0912    Narrative:       XR HIP W OR WO PELVIS 2-3 VIEW LEFT-, FL C ARM DURING SURGERY-     INDICATIONS: Right intertrochanteric fracture     TECHNIQUE: FLUOROSCOPIC ASSISTANCE IN THE OPERATING ROOM.     FINDINGS:     4 intraoperative fluoroscopic spot views were obtained and recorded the  PACS for review, revealing erwin and screw hardware fixation of right  intertrochanteric fracture. Please see operative report for full  details.     Fluoroscopy time: 25.2 seconds       Impression:          As described.     This report was finalized on 4/25/2020 8:23 AM by Dr. Jerzy Murphy M.D.       XR Hip With or Without Pelvis 2 - 3 View Right [[id number]] Collected:  04/25/20 0902     Updated:  04/25/20 0906    Narrative:       XR HIP W OR WO PELVIS 2-3 VIEW RIGHT-     INDICATIONS: Postoperative evaluation.     TECHNIQUE: Frontal views of the right hip     COMPARISON: 04/23/2020     FINDINGS:      Intact appearing anterior surgical hardware is seen transfixing the  right intertrochanteric fracture, with adjacent surgical soft tissue  gas,  overlying skin staples. Arterial calcifications are conspicuous.          Impression:          Postsurgical changes.           This report was finalized on 4/25/2020 9:03 AM by Dr. Jerzy Murphy M.D.       XR Chest 1 View [[id number]] Collected:  04/23/20 1438     Updated:  04/23/20 1450    Narrative:       XR CHEST 1 VW-, XR HIP W OR WO PELVIS 2-3 VIEW RIGHT-     HISTORY: Female who is 84 years-old,  trauma, preoperative evaluation     TECHNIQUE: Frontal views of the pelvis, 2 views of the right hip,  frontal views of the chest      COMPARISON: Chest x-ray from 11/30/2018     FINDINGS:     Chest x-ray: The heart size is borderline. Aorta is calcified. Mild  prominence of vascular markings. No focal pulmonary consolidation,  pleural effusion, or pneumothorax. No acute osseous process is noted in  the chest.     Right hip x-ray: Right intertrochanteric fracture is present with  proximal retraction of distal fracture fragment. No other fractures are  noted. No dislocation. Hip joint spaces appear mildly narrowed.  Degenerative changes are seen at the symphysis pubis, partly included  lower lumbar spine. Arterial calcifications are present.       Impression:       1. Right intertrochanteric fracture.  2. No focal pulmonary consolidation. Borderline heart size with mild  vascular prominence.     This report was finalized on 4/23/2020 2:47 PM by Dr. Jerzy Murphy M.D.       XR Hip With or Without Pelvis 2 - 3 View Right [[id number]] Collected:  04/23/20 1438     Updated:  04/23/20 1450    Narrative:       XR CHEST 1 VW-, XR HIP W OR WO PELVIS 2-3 VIEW RIGHT-     HISTORY: Female who is 84 years-old,  trauma, preoperative evaluation     TECHNIQUE: Frontal views of the pelvis, 2 views of the right hip,  frontal views of the chest     COMPARISON: Chest x-ray from 11/30/2018     FINDINGS:     Chest x-ray: The heart size is borderline. Aorta is calcified. Mild  prominence of vascular markings. No focal pulmonary consolidation,  pleural effusion, or pneumothorax. No acute osseous process is noted in  the chest.     Right hip x-ray: Right intertrochanteric fracture is present with  proximal retraction of distal fracture fragment. No other fractures are  noted. No dislocation. Hip joint spaces appear mildly narrowed.  Degenerative changes are seen at the symphysis pubis, partly included  lower lumbar spine. Arterial calcifications are present.       Impression:       1. Right intertrochanteric fracture.  2. No focal pulmonary consolidation. Borderline  heart size with mild  vascular prominence.     This report was finalized on 4/23/2020 2:47 PM by Dr. Jerzy Murphy M.D.               Disposition:    Skilled nursing facility    Patient Instructions:        Discharge Medications      New Medications      Instructions Start Date   cefuroxime 500 MG tablet  Commonly known as:  CEFTIN   500 mg, Oral, Every 12 Hours Scheduled         Changes to Medications      Instructions Start Date   carvedilol 12.5 MG tablet  Commonly known as:  COREG  What changed:  how much to take   12.5 mg, Oral, Every 12 Hours Scheduled      furosemide 40 MG tablet  Commonly known as:  LASIX  What changed:  when to take this   40 mg, Oral, Daily      insulin aspart 100 UNIT/ML injection  Commonly known as:  novoLOG  What changed:    · how much to take  · when to take this  · Another medication with the same name was removed. Continue taking this medication, and follow the directions you see here.   5 Units, Subcutaneous, 3 Times Daily Before Meals      lisinopril 40 MG tablet  Commonly known as:  PRINIVIL,ZESTRIL  What changed:  how much to take   20 mg, Oral, Daily      potassium chloride 20 MEQ CR tablet  Commonly known as:  K-DUR,KLOR-CON  What changed:  how much to take   20 mEq, Oral, Daily         Continue These Medications      Instructions Start Date   acetaminophen 325 MG tablet  Commonly known as:  TYLENOL   650 mg, Oral, Every 4 Hours PRN      ammonium lactate 12 % cream  Commonly known as:  AMLACTIN   Topical, 2 Times Daily      apixaban 5 MG tablet tablet  Commonly known as:  ELIQUIS   5 mg, Oral, 2 Times Daily      aspirin 81 MG EC tablet   81 mg, Oral, Daily      atorvastatin 40 MG tablet  Commonly known as:  LIPITOR   40 mg, Oral, Daily      cholecalciferol 25 MCG (1000 UT) tablet  Commonly known as:  VITAMIN D3   1,000 Units, Oral, Daily      cloNIDine 0.3 MG/24HR patch  Commonly known as:  CATAPRES-TTS   1 patch, Transdermal, Weekly, Friday      hydrALAZINE 100 MG  tablet  Commonly known as:  APRESOLINE   100 mg, Oral, Every 8 Hours Scheduled      HYDROcodone-acetaminophen 5-325 MG per tablet  Commonly known as:  NORCO   1 tablet, Oral, Every 6 Hours PRN      insulin detemir 100 UNIT/ML injection  Commonly known as:  LEVEMIR   12 Units, Subcutaneous, Every Morning      ipratropium-albuterol 0.5-2.5 mg/3 ml nebulizer  Commonly known as:  DUO-NEB   3 mL, Nebulization, 4 Times Daily - RT      isosorbide mononitrate 30 MG 24 hr tablet  Commonly known as:  IMDUR   30 mg, Oral, Every 12 Hours      MULTIVITAMIN & MINERAL PO   1 tablet, Oral, Daily      nitroglycerin 0.4 MG SL tablet  Commonly known as:  NITROSTAT   0.4 mg, Sublingual, Every 5 Minutes PRN, Take no more than 3 doses in 15 minutes.      risperiDONE 1 MG disintegrating tablet  Commonly known as:  risperDAL M-TABS   1 mg, Oral, Nightly         Stop These Medications    polyethylene glycol packet  Commonly known as:  MIRALAX     traMADol 50 MG tablet  Commonly known as:  ULTRAM            Discharge Order (From admission, onward)     Start     Ordered    05/01/20 0929  Discharge patient  Once     Expected Discharge Date:  05/01/20    Discharge Disposition:  Skilled Nursing Facility (DC - External)    Physician of Record for Attribution - Please select from Treatment Team:  GIO MCGRATH [[id number]]    Review needed by CMO to determine Physician of Record:  No       Question Answer Comment   Physician of Record for Attribution - Please select from Treatment Team GIO MCGRATH    Review needed by CMO to determine Physician of Record No        05/01/20 8029                 Contact information for follow-up providers     Gio Mcgrath MD .    Specialties:  Internal Medicine, Hospitalist  Contact information:  [address] [address] [phone]                   Contact information for after-discharge care     Destination     [facility] .    Service:  Skilled Nursing  Contact  information:  [address] [facility] [id number] [phone]                             Total time spent discharging patient including evaluation,post hospitalization follow up,  medication and post hospitalization instructions and education total time exceeds 30 minutes.    Signed:  Gustavo Mcgrath MD  5/1/2020  09:37

## 2020-05-01 NOTE — PROGRESS NOTES
Continued Stay Note  [facility]     Patient Name: Britney Deras  MRN: [id number]  Today's Date: 5/1/2020    Admit Date: 4/23/2020    Discharge Plan     Row Name 05/01/20 0949       Plan    Plan  Discharge to a skilled bed at [facility] by [facility].      Plan Comments  Noted d/c orders for the patient, spoke to Candie Redding who confirmed bed availability for the patient today and stated that the patient needed to be at [facility] before noon.  Spoke to [facility] [phone] (work cell) who confirmed that the patient is to return to [facility] by ambulance upon d/c.  [facility] was arranged for the patient at 11am. The patient's RN was informed and [facility] will follow to assist with the patient's d/c to a skilled bed at [facility] by [facility].  LUISA Davis        Discharge Codes    No documentation.       Expected Discharge Date and Time     Expected Discharge Date Expected Discharge Time    May 1, 2020             LUISA Palma

## 2020-05-01 NOTE — PROGRESS NOTES
Case Management Discharge Note      Final Note: The patient was discharged to a skilled bed at [facility] and will be transported by [facility]. LUISA Davis    Provided Post Acute Provider List?: Refused  Refused Provider List Comment: The state guardian was not provided with a HH/SNF list or a print out of HH/nursing home compare lists from [employer] as she stated that the patient will return to [facility] upon d/c.    Provided Post Acute Provider Quality & Resource List?: Refused  Refused Quality and Resource List Comment: The state guardian was not provided with a HH/SNF list or a print out of HH/nursing home compare lists from [employer] as she stated that the patient will return to [facility] upon d/c.      Destination - Selection Complete      Service Provider Request Status Selected Services Address Phone Number Fax Number    [facility] Selected Skilled Nursing [address] [id number] [phone]      Durable Medical Equipment      No service has been selected for the patient.      Dialysis/Infusion      No service has been selected for the patient.      Home Medical Care      No service has been selected for the patient.      Therapy      No service has been selected for the patient.      Community Resources      No service has been selected for the patient.        Transportation Services  Ambulance: [facility]    Final Discharge Disposition Code: 03 - skilled nursing facility (SNF)

## 2020-05-01 NOTE — NURSING NOTE
Left message for state guardian Mirela Kramer letting her know patient is returning to [facility] via ambulance at 1130. Left phone number to unit if she has an questions.

## 2020-05-01 NOTE — PLAN OF CARE
Problem: Patient Care Overview  Goal: Plan of Care Review  Outcome: Ongoing (interventions implemented as appropriate)  Flowsheets (Taken 5/1/2020 5925)  Progress: no change  Plan of Care Reviewed With: patient  Outcome Summary: 84/f POD 5 R HIP Nail.  Pain controlled w/ tylenol.  Pt was more alert and oriented.  DC plan is [facility] today.

## 2020-07-25 NOTE — ED TRIAGE NOTES
EMS was called to [facility] for patient being unconscious. When ems arrived patient was awake and alert, patient is only alert to her self- this is baseline for her. Patient is in a-fib. Patient sitting up in bed at this time.

## 2020-07-25 NOTE — ED PROVIDER NOTES
EMERGENCY DEPARTMENT ENCOUNTER    Room Number:  19/19  Date of encounter:  7/25/2020  PCP: Gustavo Mcgrath MD  Historian: Patient, nursing home records, EMS    I used full protective equipment while examining this patient.  This includes face mask, gloves and protective eyewear.  I washed my hands before entering the room and immediately upon leaving the room      HPI:  Chief Complaint: Unresponsive at nursing home  A complete HPI/ROS/PMH/PSH/SH/FH are unobtainable due to: Patient with advanced dementia    Context: Britney Deras is a 85 y.o. female who presents to the ED c/o unresponsive at nursing home.  Per nursing home staff patient had been unresponsive earlier today.  Upon EMS arrival they report that patient was awake, alert and at her baseline.  Patient is unsure why she is here and has no specific complaints currently.      PAST MEDICAL HISTORY  Active Ambulatory Problems     Diagnosis Date Noted   • NSTEMI (non-ST elevated myocardial infarction) (CMS/HCC) 02/13/2017   • Diabetes mellitus (CMS/HCC) 02/13/2017   • Hyperlipidemia 02/13/2017   • Hypertension 02/13/2017   • Schizoaffective disorder (CMS/HCC) 02/13/2017   • Chronic renal insufficiency, stage III (moderate) (CMS/HCC) 02/14/2017   • Chronic diastolic (congestive) heart failure 03/25/2017   • Lymphedema of both lower extremities 03/25/2017   • Chest pain 01/09/2018   • Coronary artery disease 01/10/2018   • Paranoid schizophrenia (CMS/HCC) 01/10/2018   • Lymphedema 01/10/2018   • Acute deep vein thrombosis (DVT) of both lower extremities (CMS/HCC) 01/10/2018   • Complicated UTI (urinary tract infection) 01/13/2018   • Toxic metabolic encephalopathy 01/13/2018   • Hypokalemia 01/13/2018   • Acute on chronic congestive heart failure (CMS/[facility]) 12/01/2018   • Closed intertrochanteric fracture of hip, right, initial encounter (CMS/HCC) 04/23/2020     Resolved Ambulatory Problems     Diagnosis Date Noted   • Dementia (CMS/HCC) 02/13/2017     Past  Medical History:   Diagnosis Date   • Angina pectoris (CMS/HCC)    • Angina pectoris (CMS/HCC)    • Arthritis    • Asthma    • Atherosclerosis    • Cellulitis    • CHF (congestive heart failure) (CMS/HCC)    • Chronic kidney disease    • Depression    • GERD (gastroesophageal reflux disease)    • History of transfusion    • Stroke (CMS/HCC)    • TIA (transient ischemic attack)          PAST SURGICAL HISTORY  Past Surgical History:   Procedure Laterality Date   • ABDOMINAL SURGERY     • CHOLECYSTECTOMY     • COLONOSCOPY     • EYE SURGERY     • FEMUR IM NAILING/RODDING Right 2020    Procedure: RT. HIP NAIL;  Surgeon: Buddy Quiñones II, MD;  Location: [facility] OR;  Service: Orthopedics;  Laterality: Right;   • HYSTERECTOMY     • PERIPHERALLY INSERTED CENTRAL CATHETER INSERTION           FAMILY HISTORY  Family History   Problem Relation Age of Onset   • Hypertension Mother    • Diabetes Mother    • Parkinsonism Father    • Hypertension Brother    • Alcohol abuse Brother          SOCIAL HISTORY  Social History     Socioeconomic History   • Marital status: Single     Spouse name: Not on file   • Number of children: Not on file   • Years of education: Not on file   • Highest education level: Not on file   Tobacco Use   • Smoking status: Former Smoker     Packs/day: 2.00     Years: 32.00     Pack years: 64.00     Types: Cigarettes     Start date:      Last attempt to quit:      Years since quittin.5   • Smokeless tobacco: Never Used   Substance and Sexual Activity   • Alcohol use: No   • Drug use: No   • Sexual activity: Defer     Birth control/protection: None         ALLERGIES  Methyldopa and Sulfa antibiotics       REVIEW OF SYSTEMS  Review of Systems   Unable to perform ROS: Dementia           PHYSICAL EXAM    I have reviewed the triage vital signs and nursing notes.    ED Triage Vitals   Temp Heart Rate Resp BP SpO2   20 1625 20 1624 20 1624 20 1624 20 1624    99.1 °F (37.3 °C) 115 14 118/58 100 %      Temp src Heart Rate Source Patient Position BP Location FiO2 (%)   -- -- -- -- --              Physical Exam  GENERAL: Alert female in no obvious distress, she is oriented x1 and can answer simple questions and follow simple commands  HENT: nares patent, oropharynx clear  EYES: no scleral icterus  CV: Irregular and slightly tachycardic with heart rate averaging around 110  RESPIRATORY: normal effort, slightly decreased breath sounds bilaterally-oxygen saturations 95% on room air  ABDOMEN: soft, obese-nontender to palpation  MUSCULOSKELETAL: no deformity  NEURO: Oriented x1 with chronic dementia.  There is moderate generalized weakness without focal deficit noted.  SKIN: warm, dry-no worrisome rashes are noted.      LAB RESULTS  No results found for this or any previous visit (from the past 24 hour(s)).    Ordered the above labs and independently reviewed the results.      RADIOLOGY  Ct Head Without Contrast    Result Date: 7/25/2020  CT HEAD WITHOUT CONTRAST  HISTORY: Unresponsive  TECHNIQUE:  Head CT includes axial imaging from the base of skull to the vertex without intravenous contrast. Radiation dose reduction techniques were utilized, including automated exposure control and exposure modulation based on body size.  COMPARISON:None.  FINDINGS: There is moderate chronic small vessel ischemic white matter change. Within the posterior left parietal lobe and left occipital lobe there is an area of encephalomalacia that is consistent with a chronic infarction. There is no evidence for mass effect or shift of midline structures. There is chronic atrophy with associated mild ventricular enlargement. There are no abnormal areas of increased attenuation intra-axially to suggest hemorrhage. No extra-axial fluid collection is observed. Within the left caudate head there is a chronic-appearing 8 mm lacunar infarction. Additionally, within the genu of the left internal capsule  there is a 7 mm chronic-appearing lacunar infarction. Within the left maxillary sinus there is a 2.4 cm mucus retention cyst. Mild right maxillary sinus mucosal thickening and sphenoid sinus mucosal thickening is present. Intracranial atherosclerotic calcifications are present.      No evidence for intracranial hemorrhage or convincing evidence for acute intracranial abnormality. There are chronic areas of encephalomalacia within the left posterior parietal lobe and left occipital lobe that are consistent with areas of chronic infarction. There are also chronic-appearing lacunar infarctions within the left caudate head and left internal capsule.  This report was finalized on 7/25/2020 6:46 PM by Dr. Denny Valadez M.D.        I ordered the above noted radiological studies. Reviewed by me and discussed with radiologist.  See dictation for official radiology interpretation.      PROCEDURES  Critical Care  Performed by: Nadeem Madrigal MD  Authorized by: Nadeem Madrigal MD     Critical care provider statement:     Critical care time (minutes):  35    Critical care time was exclusive of:  Separately billable procedures and treating other patients    Critical care was necessary to treat or prevent imminent or life-threatening deterioration of the following conditions:  Dehydration, circulatory failure and sepsis    Critical care was time spent personally by me on the following activities:  Ordering and review of laboratory studies, re-evaluation of patient's condition, review of old charts, obtaining history from patient or surrogate, evaluation of patient's response to treatment and development of treatment plan with patient or surrogate          MEDICATIONS GIVEN IN ER    Medications   piperacillin-tazobactam (ZOSYN) 3.375 g in iso-osmotic dextrose 50 ml (premix) (has no administration in time range)   sodium chloride 0.9 % bolus 2,190 mL (2,190 mL Intravenous New Bag 7/25/20 1919)         PROGRESS, DATA ANALYSIS,  CONSULTS, AND MEDICAL DECISION MAKING    All labs have been independently reviewed by me.  All radiology studies have been reviewed by me and discussed with radiologist dictating the report.   EKG's independently viewed and interpreted by me.  Discussion below represents my analysis of pertinent findings related to patient's condition, differential diagnosis, treatment plan and final disposition.      ED Course as of Jul 25 2038   Sat Jul 25, 2020   1642 I reviewed patient's prior records and see her last admission from April of this year for hip fracture and operative repair.  Patient has a history of schizoaffective disorder.    [DB]   1648 [id number] female from [facility] sent with reported unresponsiveness although she seems to be at neurologic baseline currently.  Patient is anticoagulated on Eliquis due to A. fib.  She is slightly tachycardic around 110 and atrial fibrillation.  Will check labs, EKG and head CT to look for serious causes of unresponsiveness.  Patient not a TPA or team D candidate as she is at neuro baseline currently and is already anticoagulated on Eliquis.    [DB]   1707   EKG          EKG time: 1658  Rhythm/Rate: A. fib 128  P waves and MD: Atrial fibrillation  QRS, axis: Normal axis, LVH  ST and T waves: Nonspecific ST and T wave changes    Interpreted Contemporaneously by me, independently viewed  Heart rate is faster changed compared to prior 4/2020      [DB]   1858 Nurses unable to obtain blood for laboratory evaluation.  Patient has remained awake but demented as per baseline.  Heart rate continues to run in the 1 10-1 20 range.  Blood pressure has been variable with systolics ranging from 80s to 100.    [DB]   1900 Most recent blood pressures have been running around 80 systolic over the last roughly 30 minutes to hour.  We will give 30/kg fluid bolus for possible sepsis.  I did check patient's CODE STATUS and she is DO NOT RESUSCITATE.    [DB]   1902 Given  patient's worsening blood pressure and worsening mental status I have added more testing including portable chest x-ray and urinalysis as well as blood cultures.  I suspect patient will need to be admitted due to hypotension and altered mental status.    [DB]   2020 Despite multiple attempts to draw blood nursing staff has been unable to get blood for laboratory analysis so far.  I did collect stool and sent that off for C. difficile analysis.  IV therapy and phlebotomy are here currently trying to get blood from this patient for laboratory analysis.  Also attempts at [facility] catheterization of so far been unsuccessful as resistance was met at the urethra.  We will continue to do our best to obtain blood and urine for evaluation.  Given patient's persistent hypotension will go ahead and contact Dr. Mcgrath about admission to the hospital.    [DB]   2025 Patient has had more more profuse diarrhea while here in the emergency department and blood pressure is gradually improved with most recent reading of 97/64 pulse of 104.  We are still attempting to get blood work for laboratory analysis.  Will contact Dr. Vences about admission.    [DB]   2033 I discussed evaluation of this patient with Dr. Mcgrath who will admit.  We will go ahead and order IV Zosyn to cover for potential bacterial enteritis/colitis as patient has had profound diarrhea here in the department.  Blood work including CBC chemistries and lactate are still not back yet but should come back sometime.  Unfortunately urinalysis has not been obtained as of yet as catheterization could not be accomplished.    [DB]      ED Course User Index  [DB] Nadeem Madrigal MD       AS OF 20:38 VITALS:    BP - 97/64  HR - 104  TEMP - 99.1 °F (37.3 °C)  O2 SATS - 96%      DIAGNOSIS  Final diagnoses:   Diarrhea of presumed infectious origin   Hypotension, unspecified hypotension type         DISPOSITION  Admission         Nadeem Madrigal MD  07/26/20 3934

## 2020-07-26 LAB — LACTATE HOLD SPECIMEN: NORMAL

## 2020-07-26 NOTE — ED PROVIDER NOTES
EMERGENCY DEPARTMENT ENCOUNTER    Room number:  19/19  Date Seen:  7/25/2020  Time of transfer:2030  PCP:  Gustavo Mcgrath MD    HPI  Pt is a 85 y.o. Female with dementia who presents to the ED via EMS due to being found unresponsive at her NH facility. EMS states pt had returned to baseline upon their arrival and pt has no complaints.  Patient is a DO NOT RESUSCITATE.      LABORATORY RESULTS:  Recent Results (from the past 24 hour(s))   Clostridium Difficile Toxin, PCR - Stool, Per Rectum    Collection Time: 07/25/20  7:27 PM   Result Value Ref Range    C. Difficile Toxins by PCR Negative Negative   Comprehensive Metabolic Panel    Collection Time: 07/25/20  8:22 PM   Result Value Ref Range    Glucose 109 (H) 65 - 99 mg/dL    BUN 25 (H) 8 - 23 mg/dL    Creatinine 1.70 (H) 0.57 - 1.00 mg/dL    Sodium 143 136 - 145 mmol/L    Potassium 5.4 (H) 3.5 - 5.2 mmol/L    Chloride 110 (H) 98 - 107 mmol/L    CO2 20.4 (L) 22.0 - 29.0 mmol/L    Calcium 9.3 8.6 - 10.5 mg/dL    Total Protein 5.0 (L) 6.0 - 8.5 g/dL    Albumin 2.50 (L) 3.50 - 5.20 g/dL    ALT (SGPT) 71 (H) 1 - 33 U/L    AST (SGOT) 137 (H) 1 - 32 U/L    Alkaline Phosphatase 118 (H) 39 - 117 U/L    Total Bilirubin 0.6 0.0 - 1.2 mg/dL    eGFR Non African Amer 29 (L) >60 mL/min/1.73    Globulin 2.5 gm/dL    A/G Ratio 1.0 g/dL    BUN/Creatinine Ratio 14.7 7.0 - 25.0    Anion Gap 12.6 5.0 - 15.0 mmol/L   Protime-INR    Collection Time: 07/25/20  8:22 PM   Result Value Ref Range    Protime 28.7 (H) 11.7 - 14.2 Seconds    INR 2.81 (H) 0.90 - 1.10   Troponin    Collection Time: 07/25/20  8:22 PM   Result Value Ref Range    Troponin T 0.060 (C) 0.000 - 0.030 ng/mL   CBC Auto Differential    Collection Time: 07/25/20  8:22 PM   Result Value Ref Range    WBC 4.92 3.40 - 10.80 10*3/mm3    RBC 4.53 3.77 - 5.28 10*6/mm3    Hemoglobin 10.5 (L) 12.0 - 15.9 g/dL    Hematocrit 36.6 34.0 - 46.6 %    MCV 80.8 79.0 - 97.0 fL    MCH 23.2 (L) 26.6 - 33.0 pg    MCHC 28.7 (L) 31.5 - 35.7  g/dL    RDW 14.8 12.3 - 15.4 %    RDW-SD 44.2 37.0 - 54.0 fl    MPV 11.3 6.0 - 12.0 fL    Platelets 263 140 - 450 10*3/mm3   Lactic Acid, Plasma    Collection Time: 07/25/20  8:22 PM   Result Value Ref Range    Lactate 6.3 (C) 0.5 - 2.0 mmol/L   Procalcitonin    Collection Time: 07/25/20  8:22 PM   Result Value Ref Range    Procalcitonin 6.84 (H) 0.00 - 0.25 ng/mL   Manual Differential    Collection Time: 07/25/20  8:22 PM   Result Value Ref Range    Neutrophil % 86.0 (H) 42.7 - 76.0 %    Lymphocyte % 10.0 (L) 19.6 - 45.3 %    Monocyte % 4.0 (L) 5.0 - 12.0 %    Neutrophils Absolute 4.23 1.70 - 7.00 10*3/mm3    Lymphocytes Absolute 0.49 (L) 0.70 - 3.10 10*3/mm3    Monocytes Absolute 0.20 0.10 - 0.90 10*3/mm3    Poikilocytes Mod/2+ None Seen    WBC Morphology Normal Normal    Platelet Morphology Normal Normal   Urinalysis With Culture If Indicated - Urine, Catheter    Collection Time: 07/25/20  9:01 PM   Result Value Ref Range    Color, UA Straw Yellow, Straw    Appearance, UA Cloudy (A) Clear    pH, UA <=5.0 5.0 - 8.0    Specific Gravity, UA 1.020 1.005 - 1.030    Glucose, UA Negative Negative    Ketones, UA Negative Negative    Bilirubin, UA Negative Negative    Blood, UA Large (3+) (A) Negative    Protein, UA Trace (A) Negative    Leuk Esterase, UA Small (1+) (A) Negative    Nitrite, UA Negative Negative    Urobilinogen, UA 0.2 E.U./dL 0.2 - 1.0 E.U./dL   Urinalysis, Microscopic Only - Urine, Catheter    Collection Time: 07/25/20  9:01 PM   Result Value Ref Range    RBC, UA Too Numerous to Count (A) None Seen, 0-2 /HPF    WBC, UA Too Numerous to Count (A) None Seen, 0-2 /HPF    Bacteria, UA 4+ (A) None Seen /HPF    Squamous Epithelial Cells, UA 0-2 None Seen, 0-2 /HPF    Hyaline Casts, UA 0-2 None Seen /LPF    Methodology Automated Microscopy    Respiratory Panel PCR w/COVID-19(SARS-CoV-2) STAN In-House, NP swab in UTM/VTM - Swab, Nasopharynx    Collection Time: 07/25/20  9:02 PM   Result Value Ref Range     ADENOVIRUS, PCR Not Detected Not Detected    Coronavirus 229E Not Detected Not Detected    Coronavirus HKU1 Not Detected Not Detected    Coronavirus NL63 Not Detected Not Detected    Coronavirus OC43 Not Detected Not Detected    COVID19 Not Detected Not Detected - Ref. Range    Human Metapneumovirus Not Detected Not Detected    Human Rhinovirus/Enterovirus Not Detected Not Detected    Influenza A PCR Not Detected Not Detected    Influenza A H1 Not Detected Not Detected    Influenza A H1 2009 PCR Not Detected Not Detected    Influenza A H3 Not Detected Not Detected    Influenza B PCR Not Detected Not Detected    Parainfluenza Virus 1 Not Detected Not Detected    Parainfluenza Virus 2 Not Detected Not Detected    Parainfluenza Virus 3 Not Detected Not Detected    Parainfluenza Virus 4 Not Detected Not Detected    RSV, PCR Not Detected Not Detected    Bordetella pertussis pcr Not Detected Not Detected    Bordetella parapertussis PCR Not Detected Not Detected    Chlamydophila pneumoniae PCR Not Detected Not Detected    Mycoplasma pneumo by PCR Not Detected Not Detected   Gastrointestinal Panel, PCR - Stool, Per Rectum    Collection Time: 07/25/20  9:27 PM   Result Value Ref Range    Campylobacter Not Detected Not Detected    Plesiomonas shigelloides Not Detected Not Detected    Salmonella Not Detected Not Detected    Vibrio Not Detected Not Detected    Vibrio cholerae Not Detected Not Detected    Yersinia enterocolitica Not Detected Not Detected    Enteroaggregative E. coli (EAEC) Not Detected Not Detected    Enteropathogenic E. coli (EPEC) Not Detected Not Detected    Enterotoxigenic E. coli (ETEC) lt/st Not Detected Not Detected    Shiga-like toxin-producing E. coli (STEC) stx1/stx2 Not Detected Not Detected    E. coli O157 Not Detected Not Detected    Shigella/Enteroinvasive E. coli (EIEC) Not Detected Not Detected    Cryptosporidium Not Detected Not Detected    Cyclospora cayetanensis Not Detected Not Detected     Entamoeba histolytica Not Detected Not Detected    Giardia lamblia Not Detected Not Detected    Adenovirus F40/41 Not Detected Not Detected    Astrovirus Not Detected Not Detected    Norovirus GI/GII Not Detected Not Detected    Rotavirus A Not Detected Not Detected    Sapovirus (I, II, IV or V) Not Detected Not Detected     I reviewed the above results.     RADIOLOGY:  XR Chest 1 View   Final Result   No acute findings.       This report was finalized on 7/25/2020 9:39 PM by Dr. Dipika Lyn M.D.          CT Head Without Contrast   Final Result   No evidence for intracranial hemorrhage or convincing   evidence for acute intracranial abnormality. There are chronic areas of   encephalomalacia within the left posterior parietal lobe and left   occipital lobe that are consistent with areas of chronic infarction.   There are also chronic-appearing lacunar infarctions within the left   caudate head and left internal capsule.        This report was finalized on 7/25/2020 6:46 PM by Dr. Denny Valadez M.D.            I reviewed the above results    MEDICATIONS ORDERED IN THE ED:  Medications   sodium chloride 0.9 % bolus 2,190 mL (0 mL/kg × 73 kg Intravenous Stopped 7/25/20 2100)   piperacillin-tazobactam (ZOSYN) 3.375 g in iso-osmotic dextrose 50 ml (premix) (0 g Intravenous Stopped 7/25/20 2157)   sodium chloride 0.9 % bolus 1,000 mL (0 mL Intravenous Stopped 7/25/20 2129)       PROGRESS AND CONSULT NOTES:  2030:  Patient care transferred from Dr. Madrigal pending admission after lab workup.    2050: RN approached and updated me that pt's pressures are in the 70s systolic. Discussed plan for further fluids.    2116: Reviewed pt's labs further. Discussed plan for abx as ordered by Dr. Madrigal and call placed to Dr. Mcgrath.     2126: Reviewed pt's BP of 67 systolic. Plan to discuss possibility of palliative care with Dr. Mcgrath.     2218: Pt is awake but confused, HR is irregularly irregular in the 100s and sats  are 96% on room air. Pt's BP is 96/71. RN states pt has been having persistent diarrhea, but is also receiving abx and has had 3L of fluids. Pending call with Dr. Mcgrath.    2230: I was called to the room after the patient vomited and then became apneic and pulseless.  Dr. Mcgrath called at that time and confirmed that the patient was a DO NOT RESUSCITATE.    2238: The patient is apneic and asystolic and I have called the time of death after performing a cardiac ultrasound which showed no cardiac contractility.  The patient's DNR paperwork is on the chart.    DIAGNOSIS:  Final diagnoses:   Cardiopulmonary arrest (CMS/[facility])       DISPOSITION:      Discussed treatment plan and reason for admission with pt/family and admitting physician.  Pt/family voiced understanding of the plan for admission for further testing/treatment as needed.       Provider attestation:  I personally reviewed the past medical history, past surgical history, social history, family history, current medications, and allergies as they appear in the chart.    Documentation assistance provided by magaly Walker for Dr. Hensley.  Information recorded by the scribe was done at my direction and has been verified and validated by me.           Alesha Walker  20 [phone]       Denny Hensley MD  20 [phone]

## 2020-07-26 NOTE — ED NOTES
Patient was wearing facemask when RN entered the room and throughout our encounter. RN wearing PPE throughout all patient encounter including a face mask, goggles, gown and gloves.     Herminia Kirkland RN  07/25/20 3124

## 2020-07-26 NOTE — ED NOTES
Cabinet of guardianship notified of patient's passing per this RN.      Family contacts:  Julia Schneider, niece, [phone] and [place] and Tam Averyjames, cousins,  [phone].      Pt has burial agreement at [facility], policy # [id number].     Ana Laura Ladd, RN  20 [phone]

## 2020-07-26 NOTE — ED NOTES
Phone number provided per state guardianship office for niece is incorrect number.  Phone number provided for cousins is out of service number.       Ana Laura Ladd RN  07/25/20 5617

## 2020-07-26 NOTE — ED NOTES
's office contacted regarding pt death.  Fritz Gomez will return page at soonest availability.       Ana Laura Ladd RN  07/25/20 6498

## 2020-07-28 LAB
BACTERIA SPEC AEROBE CULT: ABNORMAL
BACTERIA SPEC AEROBE CULT: ABNORMAL
GRAM STN SPEC: ABNORMAL
ISOLATED FROM: ABNORMAL

## 2020-07-30 LAB — BACTERIA SPEC AEROBE CULT: NORMAL

## 2021-09-21 NOTE — PROGRESS NOTES
[facility]    Physicians Statement of Medical Necessity for Ambulance Transportation    It is medically necessary for:    Patient Name: Britney Deras    Insurance Information:      To be transported by ambulance:    From (if nursing facility, specify level of care: skilled, jail, etc): [facility]    To (specify level of care if nursing facility): [facility]    Date of Service: 12/5/2018    For dialysis patients state date dialysis began:     Diagnosis: NSTEMI    Past Medical/Surgical History:  Past Medical History:   Diagnosis Date   • Angina pectoris (CMS/HCC)    • Angina pectoris (CMS/HCC)    • Arthritis    • Asthma    • Atherosclerosis    • Cellulitis    • CHF (congestive heart failure) (CMS/[facility])    • Chronic kidney disease    • Coronary artery disease    • Dementia    • Depression    • Diabetes mellitus (CMS/[facility])    • GERD (gastroesophageal reflux disease)    • History of transfusion    • Hyperlipidemia    • Hypertension    • Lymphedema    • NSTEMI (non-ST elevated myocardial infarction) (CMS/[facility])    • Stroke (CMS/[facility])    • TIA (transient ischemic attack)       Past Surgical History:   Procedure Laterality Date   • ABDOMINAL SURGERY     • CHOLECYSTECTOMY     • COLONOSCOPY     • EYE SURGERY     • HYSTERECTOMY     • PERIPHERALLY INSERTED CENTRAL CATHETER INSERTION          Current Objective Medical Evidence(including physical exam finding to support reason for limitations):    Immobilization syndrome, CVA    Other:     Physician Signature:           (RN,NP,PA,CAN, Discharge Planner) Mayra Rodas RN   Date/Time: 12/5/2018  12:52 PM       Printed Name:    __Mayra Rodas RN________________________________    [facility]   Phone: [phone] Phone: [phone]   Fax: [phone] Fax: [phone]      Patient Education     TMJ Syndrome  The temporomandibular joint (TMJ) is the joint that connects your lower jaw to your skull. You can feel it in front of your ears when you open and close your mouth. TMJ disorders cause chronic or recurrent pain and problems in the jaw joint and the muscles that control jaw movement. Symptoms of TMJ disorders are usually relieved with minor treatments. But symptoms may come back, especially in times of stress.   Causes  There is no widely agreed-on cause of TMJ disorders. They have been linked to injury, arthritis, tooth and jaw alignment, chronic fatigue syndrome, and fibromyalgia. A definite connection has not been shown to these, though.   Symptoms  · Pain in the face, jaw, or neck  · Pain with jaw movement or chewing  · Locking or catching sensation of the jaw  · Clicking, popping, or grinding sounds with movement of the TMJ  · Headache  · Ear pain    Home care  Modest treatments are a good first step toward relieving symptoms. Try these methods.   · Reduce stress on your jaw by not eating crunchy or hard-to-chew foods. Don’t eat hard or sticky candies. Soft foods and liquids are easier on the jaw.  · Protect your jaw while yawning. If you need to yawn, put your fist under your chin to prevent your mouth from opening too wide.  · To help relieve pain, put hot or cold packs on the area that hurts. Try both hot and cold to find out which works best for you. To make a cold pack, put ice cubes in a plastic bag that seals at the top. Wrap the bag in a clean, thin towel or cloth. Never put ice or an ice pack directly on the skin. If you use hot packs (small towels soaked in hot water), be careful not to burn yourself.  · You may take acetaminophen or ibuprofen for pain, unless you were given a different pain medicine. (Note: If you have chronic liver or kidney disease or have ever had a stomach ulcer or gastrointestinal bleeding, talk with your healthcare provider before using these  medicines. Also talk to your provider if you are taking medicine to prevent blood clots.) Don’t give aspirin to a child younger than age 19 unless directed by the child’s provider. Taking aspirin can put a child at risk for Reye syndrome. This is a rare but very serious disorder that most often affects the brain and the liver.  Reducing stress  If stress seems to be contributing to your symptoms, try to find the sources of stress in your life. These aren’t always obvious. Common stressors include:   · Everyday hassles. These include things such as traffic jams, missed appointments, or car trouble.  · Major life changes. These can be good, such as a new baby or job promotion. Or they can be bad, such as losing a job or losing a loved one.  · Overload. This is the feeling that you have too many responsibilities and can't take care of everything at once.  · Helplessness. This is when you feel like your problems are more than you can solve.  When possible, try to make changes in your sources of stress. See if you can avoid hassles, limit the amount of change in your life at one time, and take breaks when you feel overloaded.   Many stressful situations can't be avoided. So learning how to manage stress is very important. To make everyday stress more manageable:   · Getting regular exercise.  · Eat nutritious, balanced meals.  · Get enough rest.  · Try relaxation and breathing exercises, visualization, biofeedback, or meditation.  · Take some time out to clear your mind.  For more information, talk with your healthcare provider.  Follow-up care  Follow up with your healthcare provider, or as advised. More testing and other treatment may be needed. If changes to your lifestyle do not improve your symptoms, talk with your healthcare provider about other treatments. These include bite guards for help with teeth grinding, stress management methods, and more. If stress is an important factor and does not respond to the above  lifestyle changes, talk with your healthcare provider about a referral for stress management.   If X-rays were done, they will be reviewed by a specialist. You will be told the results and if they affect your treatment.   Call 911  Call 911 if you have any of these:   · Trouble breathing or swallowing  · Wheezing  · Confusion  · Extreme drowsiness or trouble awakening  · Fainting or loss of consciousness  · Fast heart rate  When to seek medical advice  Call your healthcare provider right away if you have any of these:   · Swollen or red face  · Jaw or face pain that gets worse  · Neck, mouth, tooth, or throat pain that gets worse  · Fever of 100.4°F (38°C) or higher, or as directed by your healthcare provider  [place] last reviewed this educational content on 8/1/2020  © 9044-8580 [employer]. All rights reserved. This information is not intended as a substitute for professional medical care. Always follow your healthcare professional's instructions.

## 2021-12-06 NOTE — PLAN OF CARE
Problem: Fall Risk (Adult)  Intervention: Monitor/Assist with Self Care    02/14/17 0928 02/16/17 0510 02/16/17 0621   Monitor/Assist with Self Care   Ambulation --  --  --    Transferring --  --  --    Toileting --  --  --    Bathing --  --  --    Dressing --  --  --    Eating --  --  --    Communication --  --  --    Swallowing (if score 2 or more for any item, consult Rehab Services) --  --  --    Activity   Activity Type --  --  ambulated in room;ambulated to bathroom;sitting, edge of bed   Activity Level of Assistance --  assistance, 1 person;assistance, stand-by --    Musculoskeletal Interventions   Self-Care Promotion independence encouraged --  --      02/16/17 0950 02/16/17 1344   Monitor/Assist with Self Care   Ambulation --  2-->assistive person   Transferring --  2-->assistive person   Toileting --  0-->independent   Bathing --  0-->independent   Dressing --  0-->independent   Eating --  0-->independent   Communication --  0-->understands/communicates without difficulty   Swallowing (if score 2 or more for any item, consult Rehab Services) 0-->swallows foods/liquids without difficulty --    Activity   Activity Type --  --    Activity Level of Assistance --  --    Musculoskeletal Interventions   Self-Care Promotion --  --        Intervention: Reduce Risk/Promote Restraint Free Environment    02/16/17 1005 02/16/17 1214   Safety Interventions   Safety/Security Measures bed alarm set --    Environmental Safety Modification assistive device/personal items within reach;clutter free environment maintained;lighting adjusted;room near unit station --    Restraint Interventions   Safety Promotion/Fall Prevention --  safety round/check completed;fall prevention program maintained;nonskid shoes/slippers when out of bed         Goal: Absence of Falls  Outcome: Ongoing (interventions implemented as appropriate)    02/16/17 1442   Fall Risk (Adult)   Absence of Falls achieves outcome         Problem: Diabetes, Type 2  (Adult)  Intervention: Support/Optimize Psychosocial Response to Condition    02/15/17 0903 02/15/17 5991   Coping Strategies   Family/Support System Care support provided --    Supportive Measures --  active listening utilized;self-care encouraged;verbalization of feelings encouraged   Coping/Psychosocial Interventions   Environmental Support --  calm environment promoted;environmental consistency promoted;rest periods encouraged         Goal: Signs and Symptoms of Listed Potential Problems Will be Absent or Manageable (Diabetes, Type 2)  Outcome: Ongoing (interventions implemented as appropriate)    02/16/17 1442   Diabetes, Type 2   Problems Assessed (Type 2 Diabetes) all   Problems Present (Type 2 Diabetes) none            Detail Level: Detailed

## (undated) DEVICE — STPLR SKIN VISISTAT WD 35CT

## (undated) DEVICE — SPNG GZ WOVN 4X4IN 12PLY 10/BX STRL

## (undated) DEVICE — NEEDLE, QUINCKE, 20GX3.5": Brand: MEDLINE

## (undated) DEVICE — VIOLET BRAIDED (POLYGLACTIN 910), SYNTHETIC ABSORBABLE SUTURE: Brand: COATED VICRYL

## (undated) DEVICE — WEBRIL* CAST PADDING: Brand: DEROYAL

## (undated) DEVICE — PK HIP PINNING 40

## (undated) DEVICE — 4.0MM LONG AO PILOT DRILL: Brand: TRIGEN

## (undated) DEVICE — APPL CHLORAPREP HI/LITE 26ML ORNG

## (undated) DEVICE — GLV SURG PREMIERPRO ORTHO LTX PF SZ8.5 BRN

## (undated) DEVICE — GLV SURG SENSICARE PI PF LF 8.5 GRN STRL

## (undated) DEVICE — SYR LUERLOK 20CC BX/50

## (undated) DEVICE — INTERTAN LAG SCREW DRILL: Brand: TRIGEN

## (undated) DEVICE — GUIDE PIN 3.2MM X 343MM: Brand: TRIGEN

## (undated) DEVICE — DRP C/ARMOR

## (undated) DEVICE — MAT FLR ABSORBENT LG 4FT 10 2.5FT

## (undated) DEVICE — BNDG ELAS CO-FLEX SLF ADHR 6IN 5YD LF STRL

## (undated) DEVICE — SOL ISO/ALC RUB 70PCT 4OZ

## (undated) DEVICE — DRSNG SURESITE WNDW 4X4.5